# Patient Record
Sex: FEMALE | Race: BLACK OR AFRICAN AMERICAN | NOT HISPANIC OR LATINO
[De-identification: names, ages, dates, MRNs, and addresses within clinical notes are randomized per-mention and may not be internally consistent; named-entity substitution may affect disease eponyms.]

---

## 2024-01-25 PROBLEM — Z00.00 ENCOUNTER FOR PREVENTIVE HEALTH EXAMINATION: Status: ACTIVE | Noted: 2024-01-25

## 2024-02-12 ENCOUNTER — APPOINTMENT (OUTPATIENT)
Dept: OTOLARYNGOLOGY | Facility: CLINIC | Age: 89
End: 2024-02-12
Payer: MEDICARE

## 2024-02-12 VITALS — HEIGHT: 66 IN | WEIGHT: 139 LBS | BODY MASS INDEX: 22.34 KG/M2

## 2024-02-12 DIAGNOSIS — Z78.9 OTHER SPECIFIED HEALTH STATUS: ICD-10-CM

## 2024-02-12 DIAGNOSIS — Z85.9 PERSONAL HISTORY OF MALIGNANT NEOPLASM, UNSPECIFIED: ICD-10-CM

## 2024-02-12 DIAGNOSIS — H93.293 OTHER ABNORMAL AUDITORY PERCEPTIONS, BILATERAL: ICD-10-CM

## 2024-02-12 DIAGNOSIS — Z82.5 FAMILY HISTORY OF ASTHMA AND OTHER CHRONIC LOWER RESPIRATORY DISEASES: ICD-10-CM

## 2024-02-12 DIAGNOSIS — Z86.79 PERSONAL HISTORY OF OTHER DISEASES OF THE CIRCULATORY SYSTEM: ICD-10-CM

## 2024-02-12 DIAGNOSIS — Z87.39 PERSONAL HISTORY OF OTHER DISEASES OF THE MUSCULOSKELETAL SYSTEM AND CONNECTIVE TISSUE: ICD-10-CM

## 2024-02-12 DIAGNOSIS — Z86.39 PERSONAL HISTORY OF OTHER ENDOCRINE, NUTRITIONAL AND METABOLIC DISEASE: ICD-10-CM

## 2024-02-12 DIAGNOSIS — H61.23 IMPACTED CERUMEN, BILATERAL: ICD-10-CM

## 2024-02-12 PROCEDURE — 69210 REMOVE IMPACTED EAR WAX UNI: CPT

## 2024-02-12 PROCEDURE — 99204 OFFICE O/P NEW MOD 45 MIN: CPT | Mod: 25

## 2024-02-12 NOTE — PHYSICAL EXAM
[TextEntry] : PHYSICAL EXAM  General: The patient was alert, oriented and in no distress. Voice was clear. She uses a wheelchair   Face: The patient had no facial asymmetry or mass. The skin was unremarkable.  Ears: External ears were normal without deformity. Ear canals- see microscopy  Procedure: Microscopic Ear Exam The patient was positioned comfortably.  The microscope was used.  Left ear:  Ear canal-she had severe cerumen impaction which was partially removed.  She had the piece of it tip of a cotton swab which was removed.  She also had a canal cholesteatoma.  I was then able to remove the medial portion of the cholesteatoma or the cerumen due to discomfort.  There was mild inflammation Tympanic membrane-not visualized  Right ear: Ear canal-cerumen impaction was removed atraumatically using a suction, curette, and/or forceps.  No inflammation Tympanic membrane- intact with no inflammation, perforation or effusion   Nose:  The external nose had no significant deformity.     On anterior rhinoscopy, the nasal mucosa was clear.   The anterior septum was grossly midline. There were no visualized polyps, purulence  or masses.   Oral cavity: Oral mucosa- normal. Oral and base of tongue- clear and without mass. Gingival and buccal mucosa- moist and without lesions. Palate- the palate moved well. There was no cleft palate. There appeared to be good salivary flow.   Oral cavity/oropharynx- no pus, erythema or mass  Neck:  The neck was symmetrical. The parotid and submandibular glands were normal without masses. The trachea was midline and there was no unusual crepitus. Thyroid was smooth and nontender and no masses were palpated. No masses  Lymphatics: Cervical adenopathy- none.

## 2024-02-12 NOTE — ASSESSMENT
[FreeTextEntry1] : She had cerumen impaction on the right side which was removed.  She had cerumen along with the tip of a cotton swab and a canal cholesteatoma on the left side.  The tip of a cotton swab was removed as was a portion of the wax.  I was able to debride some of the canal cholesteatoma but was unable to remove the medial portion.  Plan -Findings and management options were discussed with the patient and her daughter - Dry ear precautions recommended. - Avoid using cotton swabs in the ears - I am placing her on Cortisporin eardrops for the left ear - I will see her back in 1 week to remove the remaining wax in canal cholesteatoma.  Then we will then perform an audiogram - They should call and return earlier if any problems

## 2024-02-12 NOTE — HISTORY OF PRESENT ILLNESS
[de-identified] : FRANSISCA MCELROY is a 89 year old patient Here for evaluation for hearing loss.  She was accompanied by her daughter.  She has no ear pain, otorrhea, tinnitus, or dizziness.  She has no history of recurrent ear infections or prior otologic surgery.  She has not had a recent audiogram

## 2024-02-29 ENCOUNTER — NON-APPOINTMENT (OUTPATIENT)
Age: 89
End: 2024-02-29

## 2024-02-29 RX ORDER — NEOMYCIN SULFATE, POLYMYXIN B SULFATE AND HYDROCORTISONE 3.5; 10000; 1 MG/ML; [IU]/ML; MG/ML
3.5-10000-1 SOLUTION AURICULAR (OTIC)
Qty: 1 | Refills: 1 | Status: DISCONTINUED | COMMUNITY
Start: 2024-02-12 | End: 2024-02-29

## 2024-03-01 ENCOUNTER — APPOINTMENT (OUTPATIENT)
Dept: HOME HEALTH SERVICES | Facility: HOME HEALTH | Age: 89
End: 2024-03-01
Payer: MEDICARE

## 2024-03-01 VITALS
SYSTOLIC BLOOD PRESSURE: 110 MMHG | OXYGEN SATURATION: 98 % | RESPIRATION RATE: 18 BRPM | TEMPERATURE: 97 F | DIASTOLIC BLOOD PRESSURE: 70 MMHG | HEART RATE: 74 BPM

## 2024-03-01 DIAGNOSIS — I10 ESSENTIAL (PRIMARY) HYPERTENSION: ICD-10-CM

## 2024-03-01 DIAGNOSIS — Z74.09 OTHER REDUCED MOBILITY: ICD-10-CM

## 2024-03-01 DIAGNOSIS — R06.2 WHEEZING: ICD-10-CM

## 2024-03-01 DIAGNOSIS — J30.2 OTHER SEASONAL ALLERGIC RHINITIS: ICD-10-CM

## 2024-03-01 DIAGNOSIS — M54.50 LOW BACK PAIN, UNSPECIFIED: ICD-10-CM

## 2024-03-01 DIAGNOSIS — Z99.3 DEPENDENCE ON WHEELCHAIR: ICD-10-CM

## 2024-03-01 DIAGNOSIS — E78.2 MIXED HYPERLIPIDEMIA: ICD-10-CM

## 2024-03-01 DIAGNOSIS — E87.6 HYPOKALEMIA: ICD-10-CM

## 2024-03-01 DIAGNOSIS — G89.29 LOW BACK PAIN, UNSPECIFIED: ICD-10-CM

## 2024-03-01 PROCEDURE — G0506: CPT

## 2024-03-01 PROCEDURE — 99344 HOME/RES VST NEW MOD MDM 60: CPT

## 2024-03-01 RX ORDER — FUROSEMIDE 80 MG/1
80 TABLET ORAL DAILY
Qty: 90 | Refills: 3 | Status: ACTIVE | COMMUNITY
Start: 2024-03-01

## 2024-03-01 RX ORDER — METOPROLOL SUCCINATE 50 MG/1
50 TABLET, EXTENDED RELEASE ORAL
Qty: 90 | Refills: 3 | Status: ACTIVE | COMMUNITY
Start: 2024-03-01

## 2024-03-01 RX ORDER — FLUTICASONE FUROATE 27.5 UG/1
27.5 SPRAY, METERED NASAL DAILY
Qty: 1 | Refills: 3 | Status: ACTIVE | COMMUNITY
Start: 2024-03-01

## 2024-03-01 RX ORDER — ALBUTEROL SULFATE 108 UG/1
108 (90 BASE) AEROSOL, METERED RESPIRATORY (INHALATION) EVERY 4 HOURS
Qty: 1 | Refills: 4 | Status: ACTIVE | COMMUNITY
Start: 2024-03-01

## 2024-03-01 RX ORDER — POTASSIUM CHLORIDE 750 MG/1
10 TABLET, EXTENDED RELEASE ORAL
Qty: 90 | Refills: 7 | Status: ACTIVE | COMMUNITY
Start: 2024-03-01

## 2024-03-01 RX ORDER — FOLIC ACID/VIT B COMPLEX AND C 0.8 MG
0.8 TABLET ORAL DAILY
Qty: 90 | Refills: 3 | Status: ACTIVE | COMMUNITY
Start: 2024-03-01 | End: 1900-01-01

## 2024-03-01 RX ORDER — ASPIRIN ENTERIC COATED TABLETS 81 MG 81 MG/1
81 TABLET, DELAYED RELEASE ORAL DAILY
Qty: 90 | Refills: 3 | Status: ACTIVE | COMMUNITY
Start: 2024-03-01

## 2024-03-01 NOTE — REASON FOR VISIT
[Intercurrent Specialty/Sub-specialty Visits] : the patient has no intercurrent specialty/sub-specialty visits [Pre-Visit Preparation] : pre-visit preparation was not done [FreeTextEntry1] : refractory back pain s/p a successful spinal cord stimulator

## 2024-03-01 NOTE — HEALTH RISK ASSESSMENT
[TimeGetUpGo] : 25 [de-identified] : Ambulates to restroom occasionally. spend most of the day in a chair.

## 2024-03-01 NOTE — HISTORY OF PRESENT ILLNESS
[House Calls Co-Management Patient] : [unfilled] is a House Calls co-management patient [Education provided] : education provided [LastPVisitDate] : 2/28/24 [FreeTextEntry1] : osteoarthritis, breast cancer (6/30/14), hyperlipidemia, hypertension, heart failure with reduced injection fraction, chronic kidney disease, lymphedema of arm, and peripheral neuropathy; [FreeTextEntry2] : 89-year-old female with osteoarthritis, breast cancer (6/30/14), hyperlipidemia, hypertension, heart failure with reduced injection fraction, atrial fibrillation, chronic kidney disease, lymphedema of arm, and peripheral neuropathy; cervical myelopathy, spinal cord compression in the C spine; chronic neck and back pain; post laminectomy syndrome; spinal stenosis of the cervical and lumbar spine.  Patient will ambulate with rollator to the restroom but spends most of her day in a recliner.  No pressure sores noted.  Refusing physical therapy at this time.   SURGICAL HISTORY: cervical disc surgery; back surgery (2005); breast biopsy (Left, 6/30/14); and breast lumpectomy (8/7/2014).  In 11/2018 underwent a percutaneous spinal cord stimulator trial using the [employer] system. One lead only was placed.  History of hospitalization  on 7/25/23 Was hospitalized with acute on chronic HFrEF exacerbation and new onset afib. - TTE  EF  15%, severe MR Found to have NSTEMIV/Q scan with low probability of PE. Noted TODD on CKD and was deemed likely prerenal 2/2 diuresis  Dc-d on PO lasix 80 mg daily  On Entresto - was recommended by Cardiologist to start  AC given new onset afib with reduced dosage sec to impaired kidney function Had GI bleed requiring hospitalization on Eliquis . Off Eliquis. On aspirin  Patient denies fever, cough, trouble breathing, rash, vomiting and diarrhea. Patient has not been in close contact with someone covid positive.  N95 mask, gloves, eye wear and gown used during visit: [Y/]. Total face to face time with patient is 60 min.  Patient/ patient's caregiver reports no weight loss >10lbs in the past 6 months. No changes in dentition or swallowing reported, No changes in hearing or vision reported. No changes in Cognition reported. Patient denies any symptoms of depression or anxiety. Patient is ADL and IADL dependent. No changes in gait or falls reported.  Patient's home environment is safe.

## 2024-03-01 NOTE — CHRONIC CARE ASSESSMENT
[FAST Score: ____] : Functional Assessment Scale (FAST) Score: [unfilled] [PPS Score: ____] : Palliative Performance Scale (PPS) Score: [unfilled] [de-identified] : low sodium [de-identified] : As tolerated

## 2024-03-01 NOTE — COUNSELING
[ - New patient with 2 or more chronic conditions; CCM discussed and patient-centered care plan established] : New patient with 2 or more chronic conditions; CCM discussed and patient-centered care plan established [FreeTextEntry4] : Educated patient/legal representative about CCM services and consent. Educated patient/legal representative that this service is available because they have 2 or more chronic conditions, that only one Provider can furnish CCM Services per calendar month and that CCM services are subject to the usual Medicare deductible and coinsurance.  Patient/legal representative understands the right to stop the CCM services at any time by notifying House Calls office. Patient/legal representative has verbally consented 2/2024

## 2024-03-01 NOTE — REVIEW OF SYSTEMS
[Joint Pain] : joint pain [Muscle Pain] : muscle pain [Back Pain] : back pain [FreeTextEntry8] : CKD [FreeTextEntry5] : CHF, hyperlipidemia and hypertension

## 2024-03-13 ENCOUNTER — APPOINTMENT (OUTPATIENT)
Dept: OTOLARYNGOLOGY | Facility: CLINIC | Age: 89
End: 2024-03-13
Payer: MEDICARE

## 2024-03-13 VITALS — BODY MASS INDEX: 22.34 KG/M2 | HEIGHT: 66 IN | WEIGHT: 139 LBS

## 2024-03-13 DIAGNOSIS — H90.3 SENSORINEURAL HEARING LOSS, BILATERAL: ICD-10-CM

## 2024-03-13 DIAGNOSIS — H60.42 CHOLESTEATOMA OF LEFT EXTERNAL EAR: ICD-10-CM

## 2024-03-13 DIAGNOSIS — H61.21 IMPACTED CERUMEN, RIGHT EAR: ICD-10-CM

## 2024-03-13 PROCEDURE — 92557 COMPREHENSIVE HEARING TEST: CPT

## 2024-03-13 PROCEDURE — 92567 TYMPANOMETRY: CPT

## 2024-03-13 PROCEDURE — G0268 REMOVAL OF IMPACTED WAX MD: CPT

## 2024-03-13 PROCEDURE — 99213 OFFICE O/P EST LOW 20 MIN: CPT | Mod: 25

## 2024-03-13 NOTE — CONSULT LETTER
[Dear  ___] : Dear  [unfilled], [Courtesy Letter:] : I had the pleasure of seeing your patient, [unfilled], in my office today. [Please see my note below.] : Please see my note below. [Consult Closing:] : Thank you very much for allowing me to participate in the care of this patient.  If you have any questions, please do not hesitate to contact me. [Sincerely,] : Sincerely, [FreeTextEntry3] : Ayah Chavez MD [facility] at [facility] Otolaryngology  Head & Neck Surgery [facility]   Department of Otolaryngology [facility] [employer] [facility] at [facility]  Office Tel: [phone]

## 2024-03-13 NOTE — PHYSICAL EXAM
[TextEntry] : PHYSICAL EXAM  General: The patient was alert, oriented and in no distress. Voice was clear. She uses a wheelchair  Face: The patient had no facial asymmetry or mass. The skin was unremarkable.  Ears: Hearing normal to conversational voice External ears were normal without deformity. External ear canals: AS- normal. no cerumen or inflammation. AD- cerumen impaction Tympanic membranes: AS- intact. no perforation or effusion  PROCEDURE- EAR MICROSCOPY AND CERUMEN REMOVAL from right ear  Indication: cerumen removal Under the microscope, obstructing cerumen and small canal cholesteatoma were removed atraumatically from the right ear with a suction, curette and/or forceps.  Canal: normal. no inflammation.  Tympanic membrane: Intact. no perforation or effusion.  Nose:  The external nose had no significant deformity.     Neck:  The neck was symmetrical.    AUDIOGRAM- test ordered and the results reviewed and discussed with the patient. [] Hearing-bilateral sensorineural hearing loss Word recognition-right ear-88%.  Left ear-84% Tympanograms- AD- type As, AS- type As

## 2024-03-13 NOTE — ASSESSMENT
[FreeTextEntry1] : Cerumen impaction and a small canal cholesteatoma were removed from the right ear.  The ear did not show significant inflammation or remodeling.  The tympanic membrane was normal.  Audiogram showed a bilateral sensorineural hearing loss.  Plan -Findings and management options were discussed with the patient and her  - Good ear hygiene reviewed.  She should avoid using cotton swabs in the ears - Wax removal drops as needed - Annual audiogram - Hearing aid evaluation recommended - Follow-up in 1 year if doing well. - Call and return earlier if any problems

## 2024-03-13 NOTE — HISTORY OF PRESENT ILLNESS
[de-identified] : FRANSISCA MCELROY is a 89 year old patient Here for follow-up for right cerumen impaction with canal cholesteatoma.  They had been using the eardrops.  She has no pain or drainage.   ENT history no history of recurrent ear infections or prior otologic surgery.

## 2024-03-14 PROBLEM — H90.3 SENSORINEURAL HEARING LOSS (SNHL) OF BOTH EARS: Status: ACTIVE | Noted: 2024-03-13

## 2024-04-17 ENCOUNTER — APPOINTMENT (OUTPATIENT)
Dept: HOME HEALTH SERVICES | Facility: HOME HEALTH | Age: 89
End: 2024-04-17

## 2024-04-23 ENCOUNTER — APPOINTMENT (OUTPATIENT)
Dept: HOME HEALTH SERVICES | Facility: HOME HEALTH | Age: 89
End: 2024-04-23

## 2024-04-29 ENCOUNTER — NON-APPOINTMENT (OUTPATIENT)
Age: 89
End: 2024-04-29

## 2024-05-10 ENCOUNTER — TRANSCRIPTION ENCOUNTER (OUTPATIENT)
Age: 89
End: 2024-05-10

## 2024-05-10 ENCOUNTER — NON-APPOINTMENT (OUTPATIENT)
Age: 89
End: 2024-05-10
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
## 2024-05-14 ENCOUNTER — NON-APPOINTMENT (OUTPATIENT)
Age: 89
End: 2024-05-14

## 2024-05-14 LAB
ANION GAP SERPL CALC-SCNC: 14 MMOL/L
BASOPHILS # BLD AUTO: 0.04 K/UL
BASOPHILS NFR BLD AUTO: 0.5 %
BUN SERPL-MCNC: 109 MG/DL
CALCIUM SERPL-MCNC: 9.1 MG/DL
CHLORIDE SERPL-SCNC: 86 MMOL/L
CO2 SERPL-SCNC: 27 MMOL/L
CREAT SERPL-MCNC: 2.56 MG/DL
EGFR: 17 ML/MIN/1.73M2
EOSINOPHIL # BLD AUTO: 0.16 K/UL
EOSINOPHIL NFR BLD AUTO: 2.2 %
GLUCOSE SERPL-MCNC: 105 MG/DL
HCT VFR BLD CALC: 27.8 %
HGB BLD-MCNC: 9.3 G/DL
IMM GRANULOCYTES NFR BLD AUTO: 0.4 %
LYMPHOCYTES # BLD AUTO: 1 K/UL
LYMPHOCYTES NFR BLD AUTO: 13.5 %
MAN DIFF?: NORMAL
MCHC RBC-ENTMCNC: 29.3 PG
MCHC RBC-ENTMCNC: 33.5 GM/DL
MCV RBC AUTO: 87.7 FL
MONOCYTES # BLD AUTO: 0.51 K/UL
MONOCYTES NFR BLD AUTO: 6.9 %
NEUTROPHILS # BLD AUTO: 5.68 K/UL
NEUTROPHILS NFR BLD AUTO: 76.5 %
PLATELET # BLD AUTO: 286 K/UL
POTASSIUM SERPL-SCNC: 4.7 MMOL/L
RBC # BLD: 3.17 M/UL
RBC # FLD: 13.3 %
SODIUM SERPL-SCNC: 127 MMOL/L
WBC # FLD AUTO: 7.42 K/UL

## 2024-05-16 ENCOUNTER — NON-APPOINTMENT (OUTPATIENT)
Age: 89
End: 2024-05-16

## 2024-05-17 ENCOUNTER — NON-APPOINTMENT (OUTPATIENT)
Age: 89
End: 2024-05-17

## 2024-05-31 ENCOUNTER — APPOINTMENT (OUTPATIENT)
Dept: HOME HEALTH SERVICES | Facility: HOME HEALTH | Age: 89
End: 2024-05-31
Payer: MEDICARE

## 2024-05-31 DIAGNOSIS — M21.371 FOOT DROP, RIGHT FOOT: ICD-10-CM

## 2024-05-31 DIAGNOSIS — N18.4 CHRONIC KIDNEY DISEASE, STAGE 4 (SEVERE): ICD-10-CM

## 2024-05-31 DIAGNOSIS — I50.20 UNSPECIFIED SYSTOLIC (CONGESTIVE) HEART FAILURE: ICD-10-CM

## 2024-05-31 DIAGNOSIS — I89.0 LYMPHEDEMA, NOT ELSEWHERE CLASSIFIED: ICD-10-CM

## 2024-05-31 DIAGNOSIS — I48.91 UNSPECIFIED ATRIAL FIBRILLATION: ICD-10-CM

## 2024-05-31 DIAGNOSIS — E87.1 HYPO-OSMOLALITY AND HYPONATREMIA: ICD-10-CM

## 2024-05-31 DIAGNOSIS — I34.0 NONRHEUMATIC MITRAL (VALVE) INSUFFICIENCY: ICD-10-CM

## 2024-05-31 DIAGNOSIS — N17.9 ACUTE KIDNEY FAILURE, UNSPECIFIED: ICD-10-CM

## 2024-05-31 DIAGNOSIS — Z71.89 OTHER SPECIFIED COUNSELING: ICD-10-CM

## 2024-05-31 DIAGNOSIS — I25.10 ATHEROSCLEROTIC HEART DISEASE OF NATIVE CORONARY ARTERY W/OUT ANGINA PECTORIS: ICD-10-CM

## 2024-05-31 DIAGNOSIS — D64.9 ANEMIA, UNSPECIFIED: ICD-10-CM

## 2024-05-31 DIAGNOSIS — L89.92 PRESSURE ULCER OF UNSPECIFIED SITE, STAGE 2: ICD-10-CM

## 2024-05-31 DIAGNOSIS — K92.1 MELENA: ICD-10-CM

## 2024-05-31 DIAGNOSIS — G95.20 UNSPECIFIED CORD COMPRESSION: ICD-10-CM

## 2024-05-31 DIAGNOSIS — I25.5 ISCHEMIC CARDIOMYOPATHY: ICD-10-CM

## 2024-05-31 PROCEDURE — 99495 TRANSJ CARE MGMT MOD F2F 14D: CPT

## 2024-06-01 ENCOUNTER — NON-APPOINTMENT (OUTPATIENT)
Age: 89
End: 2024-06-01

## 2024-06-01 ENCOUNTER — TRANSCRIPTION ENCOUNTER (OUTPATIENT)
Age: 89
End: 2024-06-01

## 2024-06-03 VITALS
TEMPERATURE: 98.9 F | DIASTOLIC BLOOD PRESSURE: 70 MMHG | RESPIRATION RATE: 17 BRPM | OXYGEN SATURATION: 94 % | SYSTOLIC BLOOD PRESSURE: 120 MMHG | HEART RATE: 71 BPM

## 2024-06-03 PROBLEM — L89.92 PRESSURE ULCER, STAGE 2: Status: ACTIVE | Noted: 2024-06-03

## 2024-06-03 RX ORDER — ROSUVASTATIN CALCIUM 10 MG/1
10 TABLET, FILM COATED ORAL
Qty: 90 | Refills: 0 | Status: ACTIVE | COMMUNITY
Start: 2024-05-21

## 2024-06-03 RX ORDER — ROSUVASTATIN CALCIUM 20 MG/1
20 TABLET, FILM COATED ORAL
Qty: 90 | Refills: 3 | Status: DISCONTINUED | COMMUNITY
Start: 2024-03-01 | End: 2024-06-03

## 2024-06-03 RX ORDER — PANTOPRAZOLE 40 MG/1
40 TABLET, DELAYED RELEASE ORAL
Qty: 60 | Refills: 0 | Status: ACTIVE | COMMUNITY
Start: 2024-05-28

## 2024-06-03 NOTE — HISTORY OF PRESENT ILLNESS
[Family Member] : family member [Formal Caregiver] : formal caregiver [House Calls Co-Management Patient] : [unfilled] is a House Calls co-management patient [Education provided] : education provided [FreeTextEntry1] : osteoarthritis, breast cancer (6/30/14), hyperlipidemia, hypertension, heart failure with reduced injection fraction, chronic kidney disease, lymphedema of arm, and peripheral neuropathy; [FreeTextEntry2] : Rajiv Mack [LastPVisitDate] : 2/28/24

## 2024-06-03 NOTE — HEALTH RISK ASSESSMENT
[HRA Reviewed] : Health risk assessment reviewed [Some assistance needed] : feeding [Full assistance needed] : managing finances [Patient not ambulatory (Wheelchair)] : Patient is not ambulatory (Wheelchair) [Yes] : The patient does have visual impairment [TimeGetUpGo] : 0

## 2024-06-03 NOTE — REVIEW OF SYSTEMS
[Joint Pain] : joint pain [Muscle Pain] : muscle pain [Back Pain] : back pain [Negative] : Heme/Lymph [FreeTextEntry5] : CHF, hyperlipidemia and hypertension [FreeTextEntry8] : CKD [Fatigue] : fatigue [Vision Problems] : vision problems [Hearing Loss] : hearing loss [Incontinence] : incontinence [Joint Stiffness] : joint stiffness [Muscle Weakness] : muscle weakness [Unsteady Walking] : ataxia [FreeTextEntry7] : poor appetite

## 2024-06-03 NOTE — COUNSELING
[Normal Weight - ( BMI  <25 )] : normal weight - ( BMI  <25 ) [Hypertension self management education material provided] : hypertension self management education material provided [Non - Smoker] : non-smoker [Use assistive device to avoid falls] : use assistive device to avoid falls [___] : [unfilled] [Patient not on disease-modifying anti-rheumatic drug due to overall prognosis] : Patient not on disease-modifying anti-rheumatic drug due to overall prognosis [Not Recommended] : Aspirin use not recommended due to overall prognosis [Decrease hospital use] : decrease hospital use [Discussed disease trajectory with patient/caregiver] : discussed disease trajectory with patient/caregiver [] : colonoscopy [Completed] : Aspirin use discussion completed [FreeTextEntry4] : Educated patient/legal representative about CCM services and consent. Educated patient/legal representative that this service is available because they have 2 or more chronic conditions, that only one Provider can furnish CCM Services per calendar month and that CCM services are subject to the usual Medicare deductible and coinsurance.  Patient/legal representative understands the right to stop the CCM services at any time by notifying House Calls office. Patient/legal representative has verbally consented 2/2024

## 2024-06-03 NOTE — PHYSICAL EXAM
[No Acute Distress] : no acute distress [Normal Sclera/Conjunctiva] : normal sclera/conjunctiva [EOMI] : extra ocular movement intact [Normal Outer Ear/Nose] : the ears and nose were normal in appearance [Normal Oropharynx] : the oropharynx was normal [No Respiratory Distress] : no respiratory distress [Clear to Auscultation] : lungs were clear to auscultation bilaterally [No Accessory Muscle Use] : no accessory muscle use [Normal Rate] : heart rate was normal  [Regular Rhythm] : with a regular rhythm [Normal S1, S2] : normal S1 and S2 [No Murmurs] : no murmurs heard [No Edema] : there was no peripheral edema [Normal Bowel Sounds] : normal bowel sounds [Non Tender] : non-tender [Soft] : abdomen soft [Not Distended] : not distended [Normal Post Cervical Nodes] : no posterior cervical lymphadenopathy [Normal Anterior Cervical Nodes] : no anterior cervical lymphadenopathy [No CVA Tenderness] : no ~M costovertebral angle tenderness [No Spinal Tenderness] : no spinal tenderness [Normal Gait] : normal gait [Normal Strength/Tone] : muscle strength and tone were normal [No Rash] : no rash [Normal Affect] : the affect was normal [No JVD] : no jugular venous distention [Breast Exam Declined] : patient declined to have breast exam done [Oriented x3] : oriented to person, place, and time [de-identified] : mackenzie present draining c/y/u [de-identified] : +murmur [de-identified] : generalized weakness throughout, requires assistance to turn in bed, strength 3/5 to all extremities  [de-identified] : stage 2 pressure ulcer left buttock approximately 1x1cm [de-identified] : +R foot drop, daughter states is chronic [de-identified] : flat affect

## 2024-06-03 NOTE — CHRONIC CARE ASSESSMENT
[Limited decision making ability] : limited decision making ability [FAST Score: ____] : Functional Assessment Scale (FAST) Score: [unfilled] [PPS Score: ____] : Palliative Performance Scale (PPS) Score: [unfilled] [de-identified] : has PT [de-identified] : low sodium

## 2024-06-03 NOTE — REASON FOR VISIT
[Post-hospitalization from ___ Hospital] : Post-hospitalization from [unfilled] Hospital [Admitted on: ___] : The patient was admitted on [unfilled] [Discharged on ___] : discharged on [unfilled] [Initial Evaluation] : an initial evaluation [Family Member] : family member [Formal Caregiver] : formal caregiver [Post Hospitalization] : a post hospitalization visit [Pre-Visit Preparation] : pre-visit preparation was done [Intercurrent Specialty/Sub-specialty Visits] : the patient has intercurrent specialty/sub-specialty visits [FreeTextEntry1] :  successful spinal cord stimulator  [FreeTextEntry2] : chart, HIE review [FreeTextEntry3] : cardiology

## 2024-06-04 VITALS
SYSTOLIC BLOOD PRESSURE: 106 MMHG | RESPIRATION RATE: 16 BRPM | OXYGEN SATURATION: 97 % | WEIGHT: 158.73 LBS | TEMPERATURE: 99 F | DIASTOLIC BLOOD PRESSURE: 74 MMHG | HEART RATE: 84 BPM

## 2024-06-04 LAB
BASE EXCESS BLDV CALC-SCNC: -4.8 MMOL/L — LOW (ref -2–3)
BASOPHILS # BLD AUTO: 0.03 K/UL — SIGNIFICANT CHANGE UP (ref 0–0.2)
BASOPHILS NFR BLD AUTO: 0.3 % — SIGNIFICANT CHANGE UP (ref 0–2)
CO2 BLDV-SCNC: 22.4 MMOL/L — SIGNIFICANT CHANGE UP (ref 22–26)
EOSINOPHIL # BLD AUTO: 0 K/UL — SIGNIFICANT CHANGE UP (ref 0–0.5)
EOSINOPHIL NFR BLD AUTO: 0 % — SIGNIFICANT CHANGE UP (ref 0–6)
GAS PNL BLDV: SIGNIFICANT CHANGE UP
HCO3 BLDV-SCNC: 21 MMOL/L — LOW (ref 22–29)
HCT VFR BLD CALC: 25.8 % — LOW (ref 34.5–45)
HGB BLD-MCNC: 8.3 G/DL — LOW (ref 11.5–15.5)
IMM GRANULOCYTES NFR BLD AUTO: 0.5 % — SIGNIFICANT CHANGE UP (ref 0–0.9)
LACTATE SERPL-SCNC: 1.8 MMOL/L — SIGNIFICANT CHANGE UP (ref 0.5–2)
LYMPHOCYTES # BLD AUTO: 0.78 K/UL — LOW (ref 1–3.3)
LYMPHOCYTES # BLD AUTO: 8.5 % — LOW (ref 13–44)
MCHC RBC-ENTMCNC: 27.9 PG — SIGNIFICANT CHANGE UP (ref 27–34)
MCHC RBC-ENTMCNC: 32.2 GM/DL — SIGNIFICANT CHANGE UP (ref 32–36)
MCV RBC AUTO: 86.6 FL — SIGNIFICANT CHANGE UP (ref 80–100)
MONOCYTES # BLD AUTO: 1.14 K/UL — HIGH (ref 0–0.9)
MONOCYTES NFR BLD AUTO: 12.4 % — SIGNIFICANT CHANGE UP (ref 2–14)
NEUTROPHILS # BLD AUTO: 7.23 K/UL — SIGNIFICANT CHANGE UP (ref 1.8–7.4)
NEUTROPHILS NFR BLD AUTO: 78.3 % — HIGH (ref 43–77)
NRBC # BLD: 0 /100 WBCS — SIGNIFICANT CHANGE UP (ref 0–0)
PCO2 BLDV: 41 MMHG — SIGNIFICANT CHANGE UP (ref 39–42)
PH BLDV: 7.32 — SIGNIFICANT CHANGE UP (ref 7.32–7.43)
PLATELET # BLD AUTO: 281 K/UL — SIGNIFICANT CHANGE UP (ref 150–400)
PO2 BLDV: 34 MMHG — SIGNIFICANT CHANGE UP (ref 25–45)
RBC # BLD: 2.98 M/UL — LOW (ref 3.8–5.2)
RBC # FLD: 14.6 % — HIGH (ref 10.3–14.5)
SAO2 % BLDV: 53 % — LOW (ref 67–88)
WBC # BLD: 9.23 K/UL — SIGNIFICANT CHANGE UP (ref 3.8–10.5)
WBC # FLD AUTO: 9.23 K/UL — SIGNIFICANT CHANGE UP (ref 3.8–10.5)

## 2024-06-04 PROCEDURE — 99291 CRITICAL CARE FIRST HOUR: CPT

## 2024-06-04 RX ORDER — LIDOCAINE 4 G/100G
1 CREAM TOPICAL ONCE
Refills: 0 | Status: COMPLETED | OUTPATIENT
Start: 2024-06-04 | End: 2024-06-04

## 2024-06-04 RX ORDER — ONDANSETRON 8 MG/1
4 TABLET, FILM COATED ORAL ONCE
Refills: 0 | Status: COMPLETED | OUTPATIENT
Start: 2024-06-04 | End: 2024-06-04

## 2024-06-04 RX ORDER — KETOROLAC TROMETHAMINE 30 MG/ML
15 SYRINGE (ML) INJECTION ONCE
Refills: 0 | Status: DISCONTINUED | OUTPATIENT
Start: 2024-06-04 | End: 2024-06-04

## 2024-06-04 RX ORDER — CEFEPIME 1 G/1
2000 INJECTION, POWDER, FOR SOLUTION INTRAMUSCULAR; INTRAVENOUS ONCE
Refills: 0 | Status: COMPLETED | OUTPATIENT
Start: 2024-06-04 | End: 2024-06-04

## 2024-06-04 RX ADMIN — LIDOCAINE 1 PATCH: 4 CREAM TOPICAL at 23:45

## 2024-06-04 RX ADMIN — CEFEPIME 100 MILLIGRAM(S): 1 INJECTION, POWDER, FOR SOLUTION INTRAMUSCULAR; INTRAVENOUS at 23:45

## 2024-06-04 RX ADMIN — ONDANSETRON 4 MILLIGRAM(S): 8 TABLET, FILM COATED ORAL at 23:45

## 2024-06-04 RX ADMIN — Medication 15 MILLIGRAM(S): at 23:45

## 2024-06-04 NOTE — ED ADULT TRIAGE NOTE - WEIGHT IN KG
Onset: last night       Location / description: Pt reports a cough- yellow productive since last night, has SOB which she always has she states, is SOB at rest, advised ED eval, states she has noway to get to ED will go to  and disconnected call.    Precipitating Factors: as above    Pain Scale (1-10), 10 highest: 0/10    What improves/worsens symptoms: Nothing    Symptom specific medications: None  LMP : Patient's last menstrual period was 01/26/2016.   Are you pregnant or breast feeding: NA    Recent visits (last 3-4 weeks) for same reason or recent surgery:  None    PLAN:    Go to the Emergency Department    Patient/Caller does not plan to follow recommendations.      Reason for Disposition  • [1] MODERATE difficulty breathing (e.g., speaks in phrases, SOB even at rest, pulse 100-120) AND [2] still present when not coughing    Protocols used: COUGH - ACUTE [id number]      
Regarding: FEMALE 60 coughing, phlegm  and mucus breathing concerns  ----- Message from Susu Schmitz sent at 11/24/2023  5:32 PM CST -----  Patient Name: Mansi Cole    Specialist or PCP Name: Dr Papito Cerrato    Symptoms: FEMALE 60 coughing, phlegm  and mucus breathing concerns     Pregnant (females aged 13-60. If Yes, how long?) : no    Call Back # : [phone]    Which State are you currently located in?: [place]     Name of Clinic Site / Acct# : [facility]     Use following scripting for patients waiting for a callback:   \"Nurse callback times vary based on call volumes; please be aware the return phone call may come from an unidentified or out of state phone number. If your symptoms worsen or become life threatening while waiting, you should seek immediate assistance by calling 911 or going to the ER for evaluation.\"    
72

## 2024-06-04 NOTE — ED ADULT TRIAGE NOTE - CHIEF COMPLAINT QUOTE
Pt, with hx of HTN, CKD, CHF and chronic back pain (stimulator in place), presents to ER c/o lower back pain with decreased ability to ambulate (uses walker). While enroute, EMS reports pt had 2 episodes of vomiting and family at bedside reports pt had 2 episodes of diarrhea today.

## 2024-06-05 ENCOUNTER — NON-APPOINTMENT (OUTPATIENT)
Age: 89
End: 2024-06-05

## 2024-06-05 ENCOUNTER — INPATIENT (INPATIENT)
Facility: HOSPITAL | Age: 89
LOS: 2 days | Discharge: HOSPICE | DRG: 280 | End: 2024-06-08
Attending: STUDENT IN AN ORGANIZED HEALTH CARE EDUCATION/TRAINING PROGRAM | Admitting: INTERNAL MEDICINE
Payer: MEDICARE

## 2024-06-05 ENCOUNTER — RESULT REVIEW (OUTPATIENT)
Age: 89
End: 2024-06-05

## 2024-06-05 DIAGNOSIS — R50.9 FEVER, UNSPECIFIED: ICD-10-CM

## 2024-06-05 DIAGNOSIS — M54.50 LOW BACK PAIN, UNSPECIFIED: ICD-10-CM

## 2024-06-05 DIAGNOSIS — I48.20 CHRONIC ATRIAL FIBRILLATION, UNSPECIFIED: ICD-10-CM

## 2024-06-05 DIAGNOSIS — D64.9 ANEMIA, UNSPECIFIED: ICD-10-CM

## 2024-06-05 DIAGNOSIS — Z51.5 ENCOUNTER FOR PALLIATIVE CARE: ICD-10-CM

## 2024-06-05 DIAGNOSIS — R19.7 DIARRHEA, UNSPECIFIED: ICD-10-CM

## 2024-06-05 DIAGNOSIS — E87.5 HYPERKALEMIA: ICD-10-CM

## 2024-06-05 DIAGNOSIS — N39.0 URINARY TRACT INFECTION, SITE NOT SPECIFIED: ICD-10-CM

## 2024-06-05 DIAGNOSIS — Z29.9 ENCOUNTER FOR PROPHYLACTIC MEASURES, UNSPECIFIED: ICD-10-CM

## 2024-06-05 DIAGNOSIS — E87.1 HYPO-OSMOLALITY AND HYPONATREMIA: ICD-10-CM

## 2024-06-05 DIAGNOSIS — I50.23 ACUTE ON CHRONIC SYSTOLIC (CONGESTIVE) HEART FAILURE: ICD-10-CM

## 2024-06-05 DIAGNOSIS — G93.41 METABOLIC ENCEPHALOPATHY: ICD-10-CM

## 2024-06-05 DIAGNOSIS — N17.9 ACUTE KIDNEY FAILURE, UNSPECIFIED: ICD-10-CM

## 2024-06-05 LAB
ADD ON TEST-SPECIMEN IN LAB: SIGNIFICANT CHANGE UP
ALBUMIN SERPL ELPH-MCNC: 3.5 G/DL — SIGNIFICANT CHANGE UP (ref 3.3–5)
ALBUMIN SERPL ELPH-MCNC: 3.6 G/DL — SIGNIFICANT CHANGE UP (ref 3.3–5)
ALBUMIN SERPL ELPH-MCNC: 3.9 G/DL — SIGNIFICANT CHANGE UP (ref 3.3–5)
ALP SERPL-CCNC: 103 U/L — SIGNIFICANT CHANGE UP (ref 40–120)
ALP SERPL-CCNC: 88 U/L — SIGNIFICANT CHANGE UP (ref 40–120)
ALP SERPL-CCNC: 90 U/L — SIGNIFICANT CHANGE UP (ref 40–120)
ALT FLD-CCNC: 27 U/L — SIGNIFICANT CHANGE UP (ref 10–45)
ALT FLD-CCNC: 29 U/L — SIGNIFICANT CHANGE UP (ref 10–45)
ALT FLD-CCNC: 32 U/L — SIGNIFICANT CHANGE UP (ref 10–45)
ANION GAP SERPL CALC-SCNC: 11 MMOL/L — SIGNIFICANT CHANGE UP (ref 5–17)
ANION GAP SERPL CALC-SCNC: 13 MMOL/L — SIGNIFICANT CHANGE UP (ref 5–17)
ANION GAP SERPL CALC-SCNC: 13 MMOL/L — SIGNIFICANT CHANGE UP (ref 5–17)
ANION GAP SERPL CALC-SCNC: 8 MMOL/L — SIGNIFICANT CHANGE UP (ref 5–17)
ANION GAP SERPL CALC-SCNC: 8 MMOL/L — SIGNIFICANT CHANGE UP (ref 5–17)
ANION GAP SERPL CALC-SCNC: 9 MMOL/L — SIGNIFICANT CHANGE UP (ref 5–17)
APPEARANCE UR: ABNORMAL
APPEARANCE UR: ABNORMAL
APTT BLD: 33.6 SEC — SIGNIFICANT CHANGE UP (ref 24.5–35.6)
AST SERPL-CCNC: 27 U/L — SIGNIFICANT CHANGE UP (ref 10–40)
AST SERPL-CCNC: 28 U/L — SIGNIFICANT CHANGE UP (ref 10–40)
AST SERPL-CCNC: 35 U/L — SIGNIFICANT CHANGE UP (ref 10–40)
BACTERIA # UR AUTO: ABNORMAL /HPF
BASE EXCESS BLDCOV CALC-SCNC: -4.8 MMOL/L — SIGNIFICANT CHANGE UP (ref -9.3–0.3)
BASE EXCESS BLDV CALC-SCNC: -2.3 MMOL/L — LOW (ref -2–3)
BASOPHILS # BLD AUTO: 0.01 K/UL — SIGNIFICANT CHANGE UP (ref 0–0.2)
BASOPHILS NFR BLD AUTO: 0.1 % — SIGNIFICANT CHANGE UP (ref 0–2)
BILIRUB SERPL-MCNC: 0.4 MG/DL — SIGNIFICANT CHANGE UP (ref 0.2–1.2)
BILIRUB SERPL-MCNC: 0.4 MG/DL — SIGNIFICANT CHANGE UP (ref 0.2–1.2)
BILIRUB SERPL-MCNC: 0.5 MG/DL — SIGNIFICANT CHANGE UP (ref 0.2–1.2)
BILIRUB UR-MCNC: NEGATIVE — SIGNIFICANT CHANGE UP
BILIRUB UR-MCNC: NEGATIVE — SIGNIFICANT CHANGE UP
BLD GP AB SCN SERPL QL: NEGATIVE — SIGNIFICANT CHANGE UP
BLD GP AB SCN SERPL QL: NEGATIVE — SIGNIFICANT CHANGE UP
BUN SERPL-MCNC: 79 MG/DL — HIGH (ref 7–23)
BUN SERPL-MCNC: 81 MG/DL — HIGH (ref 7–23)
BUN SERPL-MCNC: 82 MG/DL — HIGH (ref 7–23)
BUN SERPL-MCNC: 82 MG/DL — HIGH (ref 7–23)
BUN SERPL-MCNC: 84 MG/DL — HIGH (ref 7–23)
BUN SERPL-MCNC: 84 MG/DL — HIGH (ref 7–23)
CALCIUM SERPL-MCNC: 8.9 MG/DL — SIGNIFICANT CHANGE UP (ref 8.4–10.5)
CALCIUM SERPL-MCNC: 8.9 MG/DL — SIGNIFICANT CHANGE UP (ref 8.4–10.5)
CALCIUM SERPL-MCNC: 9 MG/DL — SIGNIFICANT CHANGE UP (ref 8.4–10.5)
CALCIUM SERPL-MCNC: 9 MG/DL — SIGNIFICANT CHANGE UP (ref 8.4–10.5)
CALCIUM SERPL-MCNC: 9.3 MG/DL — SIGNIFICANT CHANGE UP (ref 8.4–10.5)
CALCIUM SERPL-MCNC: 9.5 MG/DL — SIGNIFICANT CHANGE UP (ref 8.4–10.5)
CAST: 7 /LPF — HIGH (ref 0–4)
CHLORIDE SERPL-SCNC: 91 MMOL/L — LOW (ref 96–108)
CHLORIDE SERPL-SCNC: 92 MMOL/L — LOW (ref 96–108)
CHLORIDE SERPL-SCNC: 93 MMOL/L — LOW (ref 96–108)
CHLORIDE SERPL-SCNC: 94 MMOL/L — LOW (ref 96–108)
CHLORIDE SERPL-SCNC: 94 MMOL/L — LOW (ref 96–108)
CHLORIDE SERPL-SCNC: 95 MMOL/L — LOW (ref 96–108)
CK MB CFR SERPL CALC: 1.8 NG/ML — SIGNIFICANT CHANGE UP (ref 0–6.7)
CK MB CFR SERPL CALC: 1.9 NG/ML — SIGNIFICANT CHANGE UP (ref 0–6.7)
CK SERPL-CCNC: 78 U/L — SIGNIFICANT CHANGE UP (ref 25–170)
CK SERPL-CCNC: 90 U/L — SIGNIFICANT CHANGE UP (ref 25–170)
CO2 BLDCOV-SCNC: 23 MMOL/L — SIGNIFICANT CHANGE UP
CO2 BLDV-SCNC: 23.7 MMOL/L — SIGNIFICANT CHANGE UP (ref 22–26)
CO2 SERPL-SCNC: 17 MMOL/L — LOW (ref 22–31)
CO2 SERPL-SCNC: 19 MMOL/L — LOW (ref 22–31)
CO2 SERPL-SCNC: 20 MMOL/L — LOW (ref 22–31)
CO2 SERPL-SCNC: 20 MMOL/L — LOW (ref 22–31)
CO2 SERPL-SCNC: 22 MMOL/L — SIGNIFICANT CHANGE UP (ref 22–31)
CO2 SERPL-SCNC: 22 MMOL/L — SIGNIFICANT CHANGE UP (ref 22–31)
COLOR SPEC: SIGNIFICANT CHANGE UP
COLOR SPEC: YELLOW — SIGNIFICANT CHANGE UP
CREAT ?TM UR-MCNC: 62 MG/DL — SIGNIFICANT CHANGE UP
CREAT SERPL-MCNC: 2.56 MG/DL — HIGH (ref 0.5–1.3)
CREAT SERPL-MCNC: 2.63 MG/DL — HIGH (ref 0.5–1.3)
CREAT SERPL-MCNC: 2.68 MG/DL — HIGH (ref 0.5–1.3)
CREAT SERPL-MCNC: 2.79 MG/DL — HIGH (ref 0.5–1.3)
CREAT SERPL-MCNC: 2.93 MG/DL — HIGH (ref 0.5–1.3)
CREAT SERPL-MCNC: 2.99 MG/DL — HIGH (ref 0.5–1.3)
DIFF PNL FLD: ABNORMAL
DIFF PNL FLD: ABNORMAL
EGFR: 14 ML/MIN/1.73M2 — LOW
EGFR: 15 ML/MIN/1.73M2 — LOW
EGFR: 16 ML/MIN/1.73M2 — LOW
EGFR: 16 ML/MIN/1.73M2 — LOW
EGFR: 17 ML/MIN/1.73M2 — LOW
EGFR: 17 ML/MIN/1.73M2 — LOW
EOSINOPHIL # BLD AUTO: 0 K/UL — SIGNIFICANT CHANGE UP (ref 0–0.5)
EOSINOPHIL NFR BLD AUTO: 0 % — SIGNIFICANT CHANGE UP (ref 0–6)
FERRITIN SERPL-MCNC: 40 NG/ML — SIGNIFICANT CHANGE UP (ref 13–330)
GAS PNL BLDCOV: 7.3 — SIGNIFICANT CHANGE UP (ref 7.25–7.45)
GAS PNL BLDV: SIGNIFICANT CHANGE UP
GLUCOSE BLDC GLUCOMTR-MCNC: 134 MG/DL — HIGH (ref 70–99)
GLUCOSE BLDC GLUCOMTR-MCNC: 97 MG/DL — SIGNIFICANT CHANGE UP (ref 70–99)
GLUCOSE SERPL-MCNC: 103 MG/DL — HIGH (ref 70–99)
GLUCOSE SERPL-MCNC: 124 MG/DL — HIGH (ref 70–99)
GLUCOSE SERPL-MCNC: 135 MG/DL — HIGH (ref 70–99)
GLUCOSE SERPL-MCNC: 141 MG/DL — HIGH (ref 70–99)
GLUCOSE SERPL-MCNC: 91 MG/DL — SIGNIFICANT CHANGE UP (ref 70–99)
GLUCOSE SERPL-MCNC: 98 MG/DL — SIGNIFICANT CHANGE UP (ref 70–99)
GLUCOSE UR QL: NEGATIVE MG/DL — SIGNIFICANT CHANGE UP
GLUCOSE UR QL: NEGATIVE MG/DL — SIGNIFICANT CHANGE UP
HAPTOGLOB SERPL-MCNC: 184 MG/DL — SIGNIFICANT CHANGE UP (ref 34–200)
HCO3 BLDCOV-SCNC: 22 MMOL/L — SIGNIFICANT CHANGE UP
HCO3 BLDV-SCNC: 22 MMOL/L — SIGNIFICANT CHANGE UP (ref 22–29)
HCT VFR BLD CALC: 22.9 % — LOW (ref 34.5–45)
HGB BLD-MCNC: 7.5 G/DL — LOW (ref 11.5–15.5)
HIV 1+2 AB+HIV1 P24 AG SERPL QL IA: SIGNIFICANT CHANGE UP
IMM GRANULOCYTES NFR BLD AUTO: 0.7 % — SIGNIFICANT CHANGE UP (ref 0–0.9)
INR BLD: 1.17 — SIGNIFICANT CHANGE UP (ref 0.85–1.18)
IRON SATN MFR SERPL: 15 UG/DL — LOW (ref 30–160)
IRON SATN MFR SERPL: SIGNIFICANT CHANGE UP % (ref 14–50)
KETONES UR-MCNC: ABNORMAL MG/DL
KETONES UR-MCNC: NEGATIVE MG/DL — SIGNIFICANT CHANGE UP
LDH SERPL L TO P-CCNC: 266 U/L — HIGH (ref 50–242)
LEUKOCYTE ESTERASE UR-ACNC: ABNORMAL
LEUKOCYTE ESTERASE UR-ACNC: ABNORMAL
LIDOCAIN IGE QN: 43 U/L — SIGNIFICANT CHANGE UP (ref 7–60)
LYMPHOCYTES # BLD AUTO: 0.5 K/UL — LOW (ref 1–3.3)
LYMPHOCYTES # BLD AUTO: 6.8 % — LOW (ref 13–44)
MAGNESIUM SERPL-MCNC: 2.1 MG/DL — SIGNIFICANT CHANGE UP (ref 1.6–2.6)
MAGNESIUM SERPL-MCNC: 2.2 MG/DL — SIGNIFICANT CHANGE UP (ref 1.6–2.6)
MCHC RBC-ENTMCNC: 28.4 PG — SIGNIFICANT CHANGE UP (ref 27–34)
MCHC RBC-ENTMCNC: 32.8 GM/DL — SIGNIFICANT CHANGE UP (ref 32–36)
MCV RBC AUTO: 86.7 FL — SIGNIFICANT CHANGE UP (ref 80–100)
MONOCYTES # BLD AUTO: 0.97 K/UL — HIGH (ref 0–0.9)
MONOCYTES NFR BLD AUTO: 13.3 % — SIGNIFICANT CHANGE UP (ref 2–14)
NEUTROPHILS # BLD AUTO: 5.78 K/UL — SIGNIFICANT CHANGE UP (ref 1.8–7.4)
NEUTROPHILS NFR BLD AUTO: 79.1 % — HIGH (ref 43–77)
NITRITE UR-MCNC: NEGATIVE — SIGNIFICANT CHANGE UP
NITRITE UR-MCNC: NEGATIVE — SIGNIFICANT CHANGE UP
NRBC # BLD: 0 /100 WBCS — SIGNIFICANT CHANGE UP (ref 0–0)
OB PNL STL: NEGATIVE — SIGNIFICANT CHANGE UP
OSMOLALITY SERPL: 292 MOSM/KG — SIGNIFICANT CHANGE UP (ref 280–301)
OSMOLALITY UR: 299 MOSM/KG — LOW (ref 300–900)
OSMOLALITY UR: 301 MOSM/KG — SIGNIFICANT CHANGE UP (ref 300–900)
PCO2 BLDCOV: 44 MMHG — SIGNIFICANT CHANGE UP (ref 27–49)
PCO2 BLDV: 38 MMHG — LOW (ref 39–42)
PH BLDV: 7.38 — SIGNIFICANT CHANGE UP (ref 7.32–7.43)
PH UR: 5.5 — SIGNIFICANT CHANGE UP (ref 5–8)
PH UR: 6 — SIGNIFICANT CHANGE UP (ref 5–8)
PHOSPHATE SERPL-MCNC: 4.3 MG/DL — SIGNIFICANT CHANGE UP (ref 2.5–4.5)
PHOSPHATE SERPL-MCNC: 4.8 MG/DL — HIGH (ref 2.5–4.5)
PHOSPHATE SERPL-MCNC: 4.9 MG/DL — HIGH (ref 2.5–4.5)
PHOSPHATE SERPL-MCNC: 5.1 MG/DL — HIGH (ref 2.5–4.5)
PLATELET # BLD AUTO: 231 K/UL — SIGNIFICANT CHANGE UP (ref 150–400)
PO2 BLDCOA: <33 MMHG — SIGNIFICANT CHANGE UP (ref 17–41)
PO2 BLDV: <33 MMHG — LOW (ref 25–45)
POTASSIUM SERPL-MCNC: 5.3 MMOL/L — SIGNIFICANT CHANGE UP (ref 3.5–5.3)
POTASSIUM SERPL-MCNC: 5.7 MMOL/L — HIGH (ref 3.5–5.3)
POTASSIUM SERPL-MCNC: 6 MMOL/L — HIGH (ref 3.5–5.3)
POTASSIUM SERPL-MCNC: 6.1 MMOL/L — HIGH (ref 3.5–5.3)
POTASSIUM SERPL-MCNC: 6.2 MMOL/L — CRITICAL HIGH (ref 3.5–5.3)
POTASSIUM SERPL-MCNC: 6.9 MMOL/L — CRITICAL HIGH (ref 3.5–5.3)
POTASSIUM SERPL-SCNC: 5.3 MMOL/L — SIGNIFICANT CHANGE UP (ref 3.5–5.3)
POTASSIUM SERPL-SCNC: 5.7 MMOL/L — HIGH (ref 3.5–5.3)
POTASSIUM SERPL-SCNC: 6 MMOL/L — HIGH (ref 3.5–5.3)
POTASSIUM SERPL-SCNC: 6.1 MMOL/L — HIGH (ref 3.5–5.3)
POTASSIUM SERPL-SCNC: 6.2 MMOL/L — CRITICAL HIGH (ref 3.5–5.3)
POTASSIUM SERPL-SCNC: 6.9 MMOL/L — CRITICAL HIGH (ref 3.5–5.3)
POTASSIUM UR-SCNC: 58 MMOL/L — SIGNIFICANT CHANGE UP
PROCALCITONIN SERPL-MCNC: 0.81 NG/ML — HIGH (ref 0.02–0.1)
PROT ?TM UR-MCNC: 102 MG/DL — HIGH (ref 0–12)
PROT SERPL-MCNC: 6.3 G/DL — SIGNIFICANT CHANGE UP (ref 6–8.3)
PROT SERPL-MCNC: 6.7 G/DL — SIGNIFICANT CHANGE UP (ref 6–8.3)
PROT SERPL-MCNC: 6.9 G/DL — SIGNIFICANT CHANGE UP (ref 6–8.3)
PROT UR-MCNC: 100 MG/DL
PROT UR-MCNC: >=1000 MG/DL
PROT/CREAT UR-RTO: 1.6 RATIO — HIGH (ref 0–0.2)
PROTHROM AB SERPL-ACNC: 13.3 SEC — HIGH (ref 9.5–13)
RAPID RVP RESULT: SIGNIFICANT CHANGE UP
RBC # BLD: 2.64 M/UL — LOW (ref 3.8–5.2)
RBC # FLD: 14.6 % — HIGH (ref 10.3–14.5)
RBC CASTS # UR COMP ASSIST: 59 /HPF — HIGH (ref 0–4)
RETICS #: 64.7 K/UL — SIGNIFICANT CHANGE UP (ref 25–125)
RETICS/RBC NFR: 2.2 % — SIGNIFICANT CHANGE UP (ref 0.5–2.5)
RH IG SCN BLD-IMP: POSITIVE — SIGNIFICANT CHANGE UP
RH IG SCN BLD-IMP: POSITIVE — SIGNIFICANT CHANGE UP
SAO2 % BLDCOV: 16.6 % — SIGNIFICANT CHANGE UP
SAO2 % BLDV: 17 % — LOW (ref 67–88)
SARS-COV-2 RNA SPEC QL NAA+PROBE: SIGNIFICANT CHANGE UP
SODIUM SERPL-SCNC: 121 MMOL/L — LOW (ref 135–145)
SODIUM SERPL-SCNC: 122 MMOL/L — LOW (ref 135–145)
SODIUM SERPL-SCNC: 123 MMOL/L — LOW (ref 135–145)
SODIUM SERPL-SCNC: 124 MMOL/L — LOW (ref 135–145)
SODIUM SERPL-SCNC: 124 MMOL/L — LOW (ref 135–145)
SODIUM SERPL-SCNC: 127 MMOL/L — LOW (ref 135–145)
SODIUM UR-SCNC: 24 MMOL/L — SIGNIFICANT CHANGE UP
SODIUM UR-SCNC: 38 MMOL/L — SIGNIFICANT CHANGE UP
SP GR SPEC: 1.01 — SIGNIFICANT CHANGE UP (ref 1–1.03)
SP GR SPEC: 1.03 — SIGNIFICANT CHANGE UP (ref 1–1.03)
SQUAMOUS # UR AUTO: 3 /HPF — SIGNIFICANT CHANGE UP (ref 0–5)
T PALLIDUM AB TITR SER: NEGATIVE — SIGNIFICANT CHANGE UP
TIBC SERPL-MCNC: SIGNIFICANT CHANGE UP UG/DL (ref 220–430)
TRANSFERRIN SERPL-MCNC: 250 MG/DL — SIGNIFICANT CHANGE UP (ref 200–360)
TROPONIN T, HIGH SENSITIVITY RESULT: 127 NG/L — CRITICAL HIGH (ref 0–51)
TROPONIN T, HIGH SENSITIVITY RESULT: 131 NG/L — CRITICAL HIGH (ref 0–51)
TROPONIN T, HIGH SENSITIVITY RESULT: 133 NG/L — CRITICAL HIGH (ref 0–51)
UIBC SERPL-MCNC: SIGNIFICANT CHANGE UP UG/DL (ref 110–370)
UROBILINOGEN FLD QL: 0.2 MG/DL — SIGNIFICANT CHANGE UP (ref 0.2–1)
UROBILINOGEN FLD QL: 1 MG/DL — SIGNIFICANT CHANGE UP (ref 0.2–1)
UUN UR-MCNC: 279 MG/DL — SIGNIFICANT CHANGE UP
WBC # BLD: 7.31 K/UL — SIGNIFICANT CHANGE UP (ref 3.8–10.5)
WBC # FLD AUTO: 7.31 K/UL — SIGNIFICANT CHANGE UP (ref 3.8–10.5)
WBC CLUMPS # UR AUTO: PRESENT
WBC UR QL: 203 /HPF — HIGH (ref 0–5)

## 2024-06-05 PROCEDURE — 99223 1ST HOSP IP/OBS HIGH 75: CPT

## 2024-06-05 PROCEDURE — 74176 CT ABD & PELVIS W/O CONTRAST: CPT | Mod: 26,MC

## 2024-06-05 PROCEDURE — 71045 X-RAY EXAM CHEST 1 VIEW: CPT | Mod: 26

## 2024-06-05 PROCEDURE — 93010 ELECTROCARDIOGRAM REPORT: CPT | Mod: 76

## 2024-06-05 PROCEDURE — 93306 TTE W/DOPPLER COMPLETE: CPT | Mod: 26

## 2024-06-05 RX ORDER — CALCIUM GLUCONATE 100 MG/ML
1 VIAL (ML) INTRAVENOUS ONCE
Refills: 0 | Status: COMPLETED | OUTPATIENT
Start: 2024-06-05 | End: 2024-06-05

## 2024-06-05 RX ORDER — FUROSEMIDE 40 MG
40 TABLET ORAL EVERY 12 HOURS
Refills: 0 | Status: DISCONTINUED | OUTPATIENT
Start: 2024-06-05 | End: 2024-06-05

## 2024-06-05 RX ORDER — SODIUM ZIRCONIUM CYCLOSILICATE 10 G/10G
5 POWDER, FOR SUSPENSION ORAL ONCE
Refills: 0 | Status: DISCONTINUED | OUTPATIENT
Start: 2024-06-05 | End: 2024-06-05

## 2024-06-05 RX ORDER — INSULIN HUMAN 100 [IU]/ML
5 INJECTION, SOLUTION SUBCUTANEOUS ONCE
Refills: 0 | Status: COMPLETED | OUTPATIENT
Start: 2024-06-05 | End: 2024-06-05

## 2024-06-05 RX ORDER — IPRATROPIUM/ALBUTEROL SULFATE 18-103MCG
3 AEROSOL WITH ADAPTER (GRAM) INHALATION ONCE
Refills: 0 | Status: DISCONTINUED | OUTPATIENT
Start: 2024-06-05 | End: 2024-06-05

## 2024-06-05 RX ORDER — SODIUM CHLORIDE 9 MG/ML
50 INJECTION INTRAMUSCULAR; INTRAVENOUS; SUBCUTANEOUS ONCE
Refills: 0 | Status: DISCONTINUED | OUTPATIENT
Start: 2024-06-05 | End: 2024-06-05

## 2024-06-05 RX ORDER — FUROSEMIDE 40 MG
80 TABLET ORAL ONCE
Refills: 0 | Status: COMPLETED | OUTPATIENT
Start: 2024-06-05 | End: 2024-06-05

## 2024-06-05 RX ORDER — SODIUM ZIRCONIUM CYCLOSILICATE 10 G/10G
10 POWDER, FOR SUSPENSION ORAL EVERY 8 HOURS
Refills: 0 | Status: DISCONTINUED | OUTPATIENT
Start: 2024-06-05 | End: 2024-06-05

## 2024-06-05 RX ORDER — HEPARIN SODIUM 5000 [USP'U]/ML
5000 INJECTION INTRAVENOUS; SUBCUTANEOUS EVERY 8 HOURS
Refills: 0 | Status: DISCONTINUED | OUTPATIENT
Start: 2024-06-05 | End: 2024-06-08

## 2024-06-05 RX ORDER — LIDOCAINE 4 G/100G
1 CREAM TOPICAL EVERY 24 HOURS
Refills: 0 | Status: DISCONTINUED | OUTPATIENT
Start: 2024-06-05 | End: 2024-06-08

## 2024-06-05 RX ORDER — FUROSEMIDE 40 MG
40 TABLET ORAL ONCE
Refills: 0 | Status: COMPLETED | OUTPATIENT
Start: 2024-06-05 | End: 2024-06-05

## 2024-06-05 RX ORDER — HYDROMORPHONE HYDROCHLORIDE 2 MG/ML
0.2 INJECTION INTRAMUSCULAR; INTRAVENOUS; SUBCUTANEOUS EVERY 6 HOURS
Refills: 0 | Status: DISCONTINUED | OUTPATIENT
Start: 2024-06-05 | End: 2024-06-05

## 2024-06-05 RX ORDER — PANTOPRAZOLE SODIUM 20 MG/1
40 TABLET, DELAYED RELEASE ORAL EVERY 12 HOURS
Refills: 0 | Status: DISCONTINUED | OUTPATIENT
Start: 2024-06-05 | End: 2024-06-08

## 2024-06-05 RX ORDER — HYDROMORPHONE HYDROCHLORIDE 2 MG/ML
0.2 INJECTION INTRAMUSCULAR; INTRAVENOUS; SUBCUTANEOUS EVERY 4 HOURS
Refills: 0 | Status: DISCONTINUED | OUTPATIENT
Start: 2024-06-05 | End: 2024-06-06

## 2024-06-05 RX ORDER — SODIUM ZIRCONIUM CYCLOSILICATE 10 G/10G
10 POWDER, FOR SUSPENSION ORAL ONCE
Refills: 0 | Status: COMPLETED | OUTPATIENT
Start: 2024-06-05 | End: 2024-06-05

## 2024-06-05 RX ORDER — ALBUTEROL 90 UG/1
10 AEROSOL, METERED ORAL ONCE
Refills: 0 | Status: COMPLETED | OUTPATIENT
Start: 2024-06-05 | End: 2024-06-05

## 2024-06-05 RX ORDER — HEPARIN SODIUM 5000 [USP'U]/ML
5000 INJECTION INTRAVENOUS; SUBCUTANEOUS EVERY 8 HOURS
Refills: 0 | Status: DISCONTINUED | OUTPATIENT
Start: 2024-06-05 | End: 2024-06-05

## 2024-06-05 RX ORDER — HYDROMORPHONE HYDROCHLORIDE 2 MG/ML
0.5 INJECTION INTRAMUSCULAR; INTRAVENOUS; SUBCUTANEOUS EVERY 4 HOURS
Refills: 0 | Status: DISCONTINUED | OUTPATIENT
Start: 2024-06-05 | End: 2024-06-06

## 2024-06-05 RX ORDER — ACETAMINOPHEN 500 MG
1000 TABLET ORAL ONCE
Refills: 0 | Status: COMPLETED | OUTPATIENT
Start: 2024-06-05 | End: 2024-06-05

## 2024-06-05 RX ORDER — SODIUM ZIRCONIUM CYCLOSILICATE 10 G/10G
10 POWDER, FOR SUSPENSION ORAL EVERY 8 HOURS
Refills: 0 | Status: DISCONTINUED | OUTPATIENT
Start: 2024-06-05 | End: 2024-06-08

## 2024-06-05 RX ORDER — DEXTROSE 50 % IN WATER 50 %
50 SYRINGE (ML) INTRAVENOUS ONCE
Refills: 0 | Status: COMPLETED | OUTPATIENT
Start: 2024-06-05 | End: 2024-06-05

## 2024-06-05 RX ORDER — SODIUM BICARBONATE 1 MEQ/ML
0.52 SYRINGE (ML) INTRAVENOUS
Qty: 150 | Refills: 0 | Status: DISCONTINUED | OUTPATIENT
Start: 2024-06-05 | End: 2024-06-05

## 2024-06-05 RX ORDER — ACETAMINOPHEN 500 MG
975 TABLET ORAL EVERY 6 HOURS
Refills: 0 | Status: DISCONTINUED | OUTPATIENT
Start: 2024-06-05 | End: 2024-06-05

## 2024-06-05 RX ORDER — CALCIUM CHLORIDE
1000 POWDER (GRAM) MISCELLANEOUS ONCE
Refills: 0 | Status: DISCONTINUED | OUTPATIENT
Start: 2024-06-05 | End: 2024-06-05

## 2024-06-05 RX ORDER — ASPIRIN/CALCIUM CARB/MAGNESIUM 324 MG
81 TABLET ORAL DAILY
Refills: 0 | Status: DISCONTINUED | OUTPATIENT
Start: 2024-06-05 | End: 2024-06-08

## 2024-06-05 RX ORDER — ATORVASTATIN CALCIUM 80 MG/1
40 TABLET, FILM COATED ORAL AT BEDTIME
Refills: 0 | Status: DISCONTINUED | OUTPATIENT
Start: 2024-06-05 | End: 2024-06-08

## 2024-06-05 RX ORDER — THIAMINE MONONITRATE (VIT B1) 100 MG
100 TABLET ORAL EVERY 24 HOURS
Refills: 0 | Status: DISCONTINUED | OUTPATIENT
Start: 2024-06-05 | End: 2024-06-08

## 2024-06-05 RX ORDER — ERTAPENEM SODIUM 1 G/1
500 INJECTION, POWDER, LYOPHILIZED, FOR SOLUTION INTRAMUSCULAR; INTRAVENOUS ONCE
Refills: 0 | Status: COMPLETED | OUTPATIENT
Start: 2024-06-05 | End: 2024-06-05

## 2024-06-05 RX ORDER — ERTAPENEM SODIUM 1 G/1
500 INJECTION, POWDER, LYOPHILIZED, FOR SOLUTION INTRAMUSCULAR; INTRAVENOUS EVERY 24 HOURS
Refills: 0 | Status: DISCONTINUED | OUTPATIENT
Start: 2024-06-05 | End: 2024-06-05

## 2024-06-05 RX ADMIN — PANTOPRAZOLE SODIUM 40 MILLIGRAM(S): 20 TABLET, DELAYED RELEASE ORAL at 17:40

## 2024-06-05 RX ADMIN — Medication 50 MILLILITER(S): at 00:54

## 2024-06-05 RX ADMIN — Medication 1000 MILLIGRAM(S): at 22:49

## 2024-06-05 RX ADMIN — Medication 400 MILLIGRAM(S): at 22:29

## 2024-06-05 RX ADMIN — HEPARIN SODIUM 5000 UNIT(S): 5000 INJECTION INTRAVENOUS; SUBCUTANEOUS at 06:24

## 2024-06-05 RX ADMIN — Medication 80 MILLIGRAM(S): at 18:28

## 2024-06-05 RX ADMIN — LIDOCAINE 1 PATCH: 4 CREAM TOPICAL at 18:23

## 2024-06-05 RX ADMIN — INSULIN HUMAN 5 UNIT(S): 100 INJECTION, SOLUTION SUBCUTANEOUS at 01:11

## 2024-06-05 RX ADMIN — SODIUM ZIRCONIUM CYCLOSILICATE 10 GRAM(S): 10 POWDER, FOR SUSPENSION ORAL at 18:18

## 2024-06-05 RX ADMIN — HEPARIN SODIUM 5000 UNIT(S): 5000 INJECTION INTRAVENOUS; SUBCUTANEOUS at 13:48

## 2024-06-05 RX ADMIN — Medication 250 MEQ/KG/HR: at 01:11

## 2024-06-05 RX ADMIN — PANTOPRAZOLE SODIUM 40 MILLIGRAM(S): 20 TABLET, DELAYED RELEASE ORAL at 06:29

## 2024-06-05 RX ADMIN — ERTAPENEM SODIUM 100 MILLIGRAM(S): 1 INJECTION, POWDER, LYOPHILIZED, FOR SOLUTION INTRAMUSCULAR; INTRAVENOUS at 13:15

## 2024-06-05 RX ADMIN — LIDOCAINE 1 PATCH: 4 CREAM TOPICAL at 06:23

## 2024-06-05 RX ADMIN — ALBUTEROL 10 MILLIGRAM(S): 90 AEROSOL, METERED ORAL at 01:13

## 2024-06-05 RX ADMIN — Medication 81 MILLIGRAM(S): at 13:15

## 2024-06-05 RX ADMIN — SODIUM ZIRCONIUM CYCLOSILICATE 10 GRAM(S): 10 POWDER, FOR SUSPENSION ORAL at 22:06

## 2024-06-05 RX ADMIN — Medication 400 MILLIGRAM(S): at 11:58

## 2024-06-05 RX ADMIN — Medication 1000 MILLIGRAM(S): at 07:05

## 2024-06-05 RX ADMIN — LIDOCAINE 1 PATCH: 4 CREAM TOPICAL at 11:45

## 2024-06-05 RX ADMIN — Medication 0.52 MEQ/KG/HR: at 01:44

## 2024-06-05 RX ADMIN — Medication 40 MILLIGRAM(S): at 00:55

## 2024-06-05 RX ADMIN — ATORVASTATIN CALCIUM 40 MILLIGRAM(S): 80 TABLET, FILM COATED ORAL at 21:55

## 2024-06-05 RX ADMIN — LIDOCAINE 1 PATCH: 4 CREAM TOPICAL at 07:05

## 2024-06-05 RX ADMIN — Medication 100 MILLIGRAM(S): at 13:14

## 2024-06-05 RX ADMIN — Medication 1000 MILLIGRAM(S): at 12:28

## 2024-06-05 RX ADMIN — Medication 40 MILLIGRAM(S): at 14:18

## 2024-06-05 RX ADMIN — Medication 15 MILLIGRAM(S): at 00:57

## 2024-06-05 RX ADMIN — Medication 400 MILLIGRAM(S): at 06:16

## 2024-06-05 RX ADMIN — Medication 1 GRAM(S): at 02:08

## 2024-06-05 RX ADMIN — HEPARIN SODIUM 5000 UNIT(S): 5000 INJECTION INTRAVENOUS; SUBCUTANEOUS at 21:37

## 2024-06-05 RX ADMIN — CEFEPIME 2000 MILLIGRAM(S): 1 INJECTION, POWDER, FOR SOLUTION INTRAMUSCULAR; INTRAVENOUS at 00:57

## 2024-06-05 RX ADMIN — Medication 100 GRAM(S): at 13:45

## 2024-06-05 RX ADMIN — Medication 100 GRAM(S): at 18:41

## 2024-06-05 RX ADMIN — SODIUM ZIRCONIUM CYCLOSILICATE 10 GRAM(S): 10 POWDER, FOR SUSPENSION ORAL at 06:53

## 2024-06-05 RX ADMIN — SODIUM ZIRCONIUM CYCLOSILICATE 10 GRAM(S): 10 POWDER, FOR SUSPENSION ORAL at 13:36

## 2024-06-05 RX ADMIN — Medication 100 GRAM(S): at 01:22

## 2024-06-05 RX ADMIN — Medication 50 MILLILITER(S): at 04:35

## 2024-06-05 RX ADMIN — INSULIN HUMAN 5 UNIT(S): 100 INJECTION, SOLUTION SUBCUTANEOUS at 04:34

## 2024-06-05 NOTE — ED PROVIDER NOTE - CRITICAL CARE ATTENDING CONTRIBUTION TO CARE
critical Care Procedure Note  Authorized and Performed by:  DO RL Sol  Total critical care time: Approximately   40 minutes  Due to a high probability of clinically significant, life threatening deterioration, the patient required my highest level of preparedness to intervene emergently and I personally spent this critical care time directly and personally managing the patient. This critical care time included obtaining a history; examining the patient; pulse oximetry; ordering and review of studies; arranging urgent treatment with development of a management plan; evaluation of patient's response to treatment; frequent reassessment; and, discussions with other providers.  This critical care time was performed to assess and manage the high probability of imminent, life-threatening deterioration that could result in multi-organ failure. It was exclusive of separately billable procedures and treating other patients and teaching time.  Please see MDM section and the rest of the note for further information on patient assessment and treatment.

## 2024-06-05 NOTE — H&P ADULT - PROBLEM SELECTOR PLAN 2
AMS likely iso renal failure with uremia and electrolyte abnormalities. Infectious etiology possible although no leukocytosis, UA w/o nitrite, WBC or bacteria, RVP negative, and Chest imaging w/o infiltrates. No report of fall or head trauma.   - tx hyperkalemia and renal failure as below  - monitor for improvement AMS likely iso renal failure with uremia and electrolyte abnormalities. Infectious etiology possible although no leukocytosis, UA w/o nitrite, RVP negative, and Chest imaging w/o infiltrates. No report of fall or head trauma.   - tx hyperkalemia and renal failure as below  - f/u B12  - f/u TSH  - f/u RPR  - f/u Vitamin B1  - monitor for improvement AMS likely iso renal failure with uremia and electrolyte abnormalities. Infectious etiology possible although no leukocytosis, UA w/o nitrite, RVP negative, and Chest imaging w/o infiltrates. No report of fall or head trauma.   - tx hyperkalemia and renal failure as below  - f/u B12  - f/u TSH  - f/u RPR  - f/u Vitamin B1  - PT/OT  - nutrition consult

## 2024-06-05 NOTE — DIETITIAN INITIAL EVALUATION ADULT - OTHER INFO
89-year-old female history of chronic lower back pain c/b left foot drop (2/2 herniated disc repair sx in 2010), now with spinal cord stimulator, hypertension, breast ca (2014, s/p R breast lumpectomy + radiation but no chemo), Afib (not on AC), HFrEF (baseline EF is 15%) CKD (baseline cr 1.3-1.5), chronic anemia (baseline hgb 8), obstructive uropathy with recently placed mackenzie 2 weeks ago at [facility] and several recent hospitalizations at [facility] in May for hyponatremia and urinary retention who presents today with worsening back pain, lethargy, confusion, nausea, and diarrhea, found to have uremia and acute on chronic renal failure and hyperkalemia.     Pt assessed at bedside on 7LA. Rx and labs reviewed. Pt presents with no overt signs of nutritional wasting. Pt received resting in bed with family at bedside providing subjective nutrition hx. Pt reportedly eating well and following a Renal diet at this; family notes pt is not on HD and no plans at this time. Unknown usual body weight; current body weight of 124 pounds, family states this wt seems lower than actual. Monitor wts weekly and assess for significant changes. PTA, pt was starting to eat more and gain wt; family notes pt with intake of 3 meals/day compared to usual 1-2 meals/day. Food preferences discussed and updated with kitchen. No complaints of nausea/vomiting/constipation/diarrhea or difficult chew/swallow. NKA to food. RDN will continue to reassess, intervene, and monitor as appropriate.     Pain: C/o pain, unspecified  GI: Abdomen non-distended/non-tender, +BS x4, last bowel movement PTA   Skin: Warm/Dry/Intact, no edema assessed

## 2024-06-05 NOTE — PROGRESS NOTE ADULT - PROBLEM SELECTOR PLAN 7
Not meeting SIRS criteria in the ED, Had mackenzie placed 2 wks ago at [facility] for obstructive uropathy, pt was discharged to [facility] with mackenzie, which was exchanged in ED. Initial UA sent had mod LE. Prior urine culture recently at [facility] growing Pansensitive E Coli.   s/p dose of cefepime in ED, monitor off antibiotics unless decompensates      - repeat UA w/ Cx  - f/u blood cultures   - Start CTX for now, can change based on Cx results

## 2024-06-05 NOTE — CONSULT NOTE ADULT - SUBJECTIVE AND OBJECTIVE BOX
NEPHROLOGY SERVICE INITIAL CONSULT NOTE    Hanna Antoine is a 89 yo F w/ PMH of ?CKD3-4 (recent outpatient sCr 2.56 however reportedly had sCr 1.5 at discharge from [facility] approx 1 week ago), s/p mackenzie for obstructive uropathy, HTN, HLD, HFrEF, afib, foot drop s/p spinal cord stimulator, breast ca s/p lumpectomy/RT, presenting with worsening back pain, lethargy, confusion, nausea, and diarrhea. Here patient found to have sCr 2.68 with K 6.9, Na 127, bicarb 19 with AG 13 and venous pH 7.32. SBP 100s. Mackenzie present on admission, UOP 40-60cc/hr recorded thus far. CT a/p without hydronephrosis, bladder collapsed around mackenzie, 1.2cm L renal nodule and 5.3 cm R renal cyst. UA with hematuria, pyuria in setting of mackenzie with UPCR 1.6. Potassium treated with lokelma, furosemide, albuterol, calcium, insulin/dextrose now improved to 5.7. Sodium decreased to 123. Patient lethargic and unable to provide history, obtained from chart review. Nephrology consulted for further management of TODD on CKD and electrolyte disturbance.    REVIEW OF SYSTEMS:   Otherwise negative except as specified in HPI    PAST MEDICAL AND SURGICAL HISTORY:   PAST MEDICAL & SURGICAL HISTORY:      FAMILY HISTORY:  FAMILY HISTORY:      Otherwise Noncontributory to current admission    SOCIAL HISTORY:  Tobacco use: Denies  EtOH use: Denies  Illicit drug use: Denies    HOME MEDICATIONS:      ACTIVE MEDICATIONS:  MEDICATIONS  (STANDING):  aspirin  chewable 81 milliGRAM(s) Oral daily  atorvastatin 40 milliGRAM(s) Oral at bedtime  ertapenem  IVPB 500 milliGRAM(s) IV Intermittent once  ertapenem  IVPB 500 milliGRAM(s) IV Intermittent every 24 hours  furosemide   Injectable 40 milliGRAM(s) IV Push every 12 hours  heparin   Injectable 5000 Unit(s) SubCutaneous every 8 hours  lidocaine   4% Patch 1 Patch Transdermal every 24 hours  pantoprazole    Tablet 40 milliGRAM(s) Oral every 12 hours  sodium zirconium cyclosilicate 10 Gram(s) Oral every 8 hours  thiamine 100 milliGRAM(s) Oral every 24 hours    MEDICATIONS  (PRN):  acetaminophen     Tablet .. 975 milliGRAM(s) Oral every 6 hours PRN Temp greater or equal to 38C (100.4F), Mild Pain (1 - 3)  HYDROmorphone  Injectable 0.2 milliGRAM(s) IV Push every 4 hours PRN Moderate Pain (4 - 6)  HYDROmorphone  Injectable 0.5 milliGRAM(s) IV Push every 4 hours PRN Severe Pain (7 - 10)      ALLERGIES:  Allergies    Aldactone (Rash)    Intolerances        VITAL SIGNS:  Vital Signs Last 24 Hrs  T(C): 36.4 (05 Jun 2024 10:00), Max: 37.9 (04 Jun 2024 22:02)  T(F): 97.6 (05 Jun 2024 10:00), Max: 100.3 (04 Jun 2024 22:02)  HR: 76 (05 Jun 2024 10:49) (76 - 97)  BP: 95/50 (05 Jun 2024 10:49) (92/54 - 111/66)  BP(mean): 67 (05 Jun 2024 10:49) (67 - 82)  RR: 16 (05 Jun 2024 10:49) (16 - 18)  SpO2: 100% (05 Jun 2024 10:49) (97% - 100%)    Parameters below as of 05 Jun 2024 10:49  Patient On (Oxygen Delivery Method): room air        06-04-24 @ 07:01  -  06-05-24 @ 07:00  --------------------------------------------------------  IN:    IV PiggyBack: 100 mL    Oral Fluid: 180 mL  Total IN: 280 mL    OUT:    Indwelling Catheter - Urethral (mL): 100 mL  Total OUT: 100 mL    Total NET: 180 mL      06-05-24 @ 07:01  -  06-05-24 @ 13:10  --------------------------------------------------------  IN:  Total IN: 0 mL    OUT:    Indwelling Catheter - Urethral (mL): 45 mL  Total OUT: 45 mL    Total NET: -45 mL          PHYSICAL EXAM:  Constitutional: Lethargic, no acute distress, lying in bed  HEENT: NCAT, neck supple  Respiratory: CTAB, no w/r/r  Cardiac: Regular rate, no murmur  Gastrointestinal: abdomen soft, NT/ND  Genitourinary: mackenzie in place  Extremities: cool extremities, no peripheral edema  Dermatologic: no rashes on exposed skin  Neurologic: Lethargic, opens eyes to verbal stimuli, grimaces during exam    LABS:                        7.5    7.31  )-----------( 231      ( 05 Jun 2024 04:57 )             22.9     06-05    123<L>  |  93<L>  |  82<H>  ----------------------------<  141<H>  5.7<H>   |  17<L>  |  2.63<H>    Ca    8.9      05 Jun 2024 04:57  Phos  4.3     06-05  Mg     2.1     06-05    TPro  6.3  /  Alb  3.5  /  TBili  0.5  /  DBili  x   /  AST  27  /  ALT  27  /  AlkPhos  88  06-05    PT/INR - ( 05 Jun 2024 04:57 )   PT: 13.3 sec;   INR: 1.17          PTT - ( 05 Jun 2024 04:57 )  PTT:33.6 sec  Urinalysis Basic - ( 05 Jun 2024 04:57 )    Color: x / Appearance: x / SG: x / pH: x  Gluc: 141 mg/dL / Ketone: x  / Bili: x / Urobili: x   Blood: x / Protein: x / Nitrite: x   Leuk Esterase: x / RBC: x / WBC x   Sq Epi: x / Non Sq Epi: x / Bacteria: x      CARDIAC MARKERS ( 05 Jun 2024 04:57 )  x     / x     / 78 U/L / x     / 1.9 ng/mL  CARDIAC MARKERS ( 05 Jun 2024 00:22 )  x     / x     / 90 U/L / x     / 1.8 ng/mL      CAPILLARY BLOOD GLUCOSE      POCT Blood Glucose.: 134 mg/dL (05 Jun 2024 04:26)  POCT Blood Glucose.: 242 mg/dL (05 Jun 2024 01:17)  POCT Blood Glucose.: 147 mg/dL (05 Jun 2024 00:45)          RADIOLOGY & ADDITIONAL TESTS: Reviewed. NEPHROLOGY SERVICE INITIAL CONSULT NOTE    Hanna Antoine is a 89 yo F w/ PMH of ?CKD3-4 (recent outpatient sCr 2.56 however reportedly had sCr 1.5 at discharge from [facility] approx 1 week ago), s/p mackenzie for obstructive uropathy, HTN, HLD, HFrEF, afib, foot drop s/p spinal cord stimulator, breast ca s/p lumpectomy/RT, presenting with worsening back pain, lethargy, confusion, nausea, and diarrhea. Here patient found to have sCr 2.68 with K 6.9, Na 127, bicarb 19 with AG 13 and venous pH 7.32. SBP 100s. Mackenzie present on admission, UOP 40-60cc/hr recorded thus far. CT a/p without hydronephrosis, bladder collapsed around mackenzie, 1.2cm L renal nodule and 5.3 cm R renal cyst. UA with hematuria, pyuria in setting of mackenzie with UPCR 1.6. Potassium treated with lokelma, furosemide, albuterol, calcium, insulin/dextrose now improved to 5.7. Sodium decreased to 123. Patient lethargic and unable to provide history, obtained from chart review. Nephrology consulted for further management of TODD on CKD and electrolyte disturbance.    REVIEW OF SYSTEMS:   Otherwise negative except as specified in HPI    PAST MEDICAL AND SURGICAL HISTORY:   PAST MEDICAL & SURGICAL HISTORY:      FAMILY HISTORY:  FAMILY HISTORY:      Otherwise Noncontributory to current admission    SOCIAL HISTORY:  Tobacco use: Denies  EtOH use: Denies  Illicit drug use: Denies    HOME MEDICATIONS:      ACTIVE MEDICATIONS:  MEDICATIONS  (STANDING):  aspirin  chewable 81 milliGRAM(s) Oral daily  atorvastatin 40 milliGRAM(s) Oral at bedtime  ertapenem  IVPB 500 milliGRAM(s) IV Intermittent once  ertapenem  IVPB 500 milliGRAM(s) IV Intermittent every 24 hours  furosemide   Injectable 40 milliGRAM(s) IV Push every 12 hours  heparin   Injectable 5000 Unit(s) SubCutaneous every 8 hours  lidocaine   4% Patch 1 Patch Transdermal every 24 hours  pantoprazole    Tablet 40 milliGRAM(s) Oral every 12 hours  sodium zirconium cyclosilicate 10 Gram(s) Oral every 8 hours  thiamine 100 milliGRAM(s) Oral every 24 hours    MEDICATIONS  (PRN):  acetaminophen     Tablet .. 975 milliGRAM(s) Oral every 6 hours PRN Temp greater or equal to 38C (100.4F), Mild Pain (1 - 3)  HYDROmorphone  Injectable 0.2 milliGRAM(s) IV Push every 4 hours PRN Moderate Pain (4 - 6)  HYDROmorphone  Injectable 0.5 milliGRAM(s) IV Push every 4 hours PRN Severe Pain (7 - 10)      ALLERGIES:  Allergies    Aldactone (Rash)    Intolerances        VITAL SIGNS:  Vital Signs Last 24 Hrs  T(C): 36.4 (05 Jun 2024 10:00), Max: 37.9 (04 Jun 2024 22:02)  T(F): 97.6 (05 Jun 2024 10:00), Max: 100.3 (04 Jun 2024 22:02)  HR: 76 (05 Jun 2024 10:49) (76 - 97)  BP: 95/50 (05 Jun 2024 10:49) (92/54 - 111/66)  BP(mean): 67 (05 Jun 2024 10:49) (67 - 82)  RR: 16 (05 Jun 2024 10:49) (16 - 18)  SpO2: 100% (05 Jun 2024 10:49) (97% - 100%)    Parameters below as of 05 Jun 2024 10:49  Patient On (Oxygen Delivery Method): room air        06-04-24 @ 07:01  -  06-05-24 @ 07:00  --------------------------------------------------------  IN:    IV PiggyBack: 100 mL    Oral Fluid: 180 mL  Total IN: 280 mL    OUT:    Indwelling Catheter - Urethral (mL): 100 mL  Total OUT: 100 mL    Total NET: 180 mL      06-05-24 @ 07:01  -  06-05-24 @ 13:10  --------------------------------------------------------  IN:  Total IN: 0 mL    OUT:    Indwelling Catheter - Urethral (mL): 45 mL  Total OUT: 45 mL    Total NET: -45 mL          PHYSICAL EXAM:  Constitutional: Lethargic, no acute distress, lying in bed  HEENT: NCAT, neck supple  Respiratory: CTAB, no w/r/r  Cardiac: Regular rate, no murmur  Gastrointestinal: abdomen soft, NT/ND  Genitourinary: mackenzie in place  Extremities: cool extremities, no peripheral edema  Dermatologic: no rashes on exposed skin  Neurologic: Lethargic, opens eyes to verbal stimuli, grimaces during exam  POCUS: Grossly reduced EF with ventricular hypertrophy, enlarged IVC without respiratory variation, VEXUS of portal and hepatic veins consistent with congestion.    LABS:                        7.5    7.31  )-----------( 231      ( 05 Jun 2024 04:57 )             22.9     06-05    123<L>  |  93<L>  |  82<H>  ----------------------------<  141<H>  5.7<H>   |  17<L>  |  2.63<H>    Ca    8.9      05 Jun 2024 04:57  Phos  4.3     06-05  Mg     2.1     06-05    TPro  6.3  /  Alb  3.5  /  TBili  0.5  /  DBili  x   /  AST  27  /  ALT  27  /  AlkPhos  88  06-05    PT/INR - ( 05 Jun 2024 04:57 )   PT: 13.3 sec;   INR: 1.17          PTT - ( 05 Jun 2024 04:57 )  PTT:33.6 sec  Urinalysis Basic - ( 05 Jun 2024 04:57 )    Color: x / Appearance: x / SG: x / pH: x  Gluc: 141 mg/dL / Ketone: x  / Bili: x / Urobili: x   Blood: x / Protein: x / Nitrite: x   Leuk Esterase: x / RBC: x / WBC x   Sq Epi: x / Non Sq Epi: x / Bacteria: x      CARDIAC MARKERS ( 05 Jun 2024 04:57 )  x     / x     / 78 U/L / x     / 1.9 ng/mL  CARDIAC MARKERS ( 05 Jun 2024 00:22 )  x     / x     / 90 U/L / x     / 1.8 ng/mL      CAPILLARY BLOOD GLUCOSE      POCT Blood Glucose.: 134 mg/dL (05 Jun 2024 04:26)  POCT Blood Glucose.: 242 mg/dL (05 Jun 2024 01:17)  POCT Blood Glucose.: 147 mg/dL (05 Jun 2024 00:45)          RADIOLOGY & ADDITIONAL TESTS: Reviewed. NEPHROLOGY SERVICE INITIAL CONSULT NOTE    Hanna Antoine is a 89 yo F w/ PMH of ?CKD3-4 (recent outpatient sCr 2.56 however reportedly had sCr 1.5 at discharge from [facility] approx 1 week ago), s/p mackenzie for obstructive uropathy, HTN, HLD, HFrEF, afib, foot drop s/p spinal cord stimulator, breast ca s/p lumpectomy/RT, presenting with worsening back pain, lethargy, confusion, nausea, and diarrhea. Here patient found to have sCr 2.68 with K 6.9, Na 127, bicarb 19 with AG 13 and venous pH 7.32. SBP 100s. Mackenzie present on admission, UOP 40-60cc/hr recorded thus far. CT a/p without hydronephrosis, bladder collapsed around mackenzie, 1.2cm L renal nodule and 5.3 cm R renal cyst. UA with hematuria, pyuria in setting of mackenzie with UPCR 1.6. Potassium treated with lokelma, furosemide, albuterol, calcium, insulin/dextrose now improved to 5.7. Sodium decreased to 123. Patient lethargic and unable to provide history, obtained from chart review. Nephrology consulted for further management of TODD on CKD and electrolyte disturbance.    REVIEW OF SYSTEMS:   Otherwise negative except as specified in HPI    PAST MEDICAL AND SURGICAL HISTORY:   PAST MEDICAL & SURGICAL HISTORY:      FAMILY HISTORY:  FAMILY HISTORY:      Otherwise Noncontributory to current admission    SOCIAL HISTORY:  Tobacco use: Denies  EtOH use: Denies  Illicit drug use: Denies    HOME MEDICATIONS:      ACTIVE MEDICATIONS:  MEDICATIONS  (STANDING):  aspirin  chewable 81 milliGRAM(s) Oral daily  atorvastatin 40 milliGRAM(s) Oral at bedtime  ertapenem  IVPB 500 milliGRAM(s) IV Intermittent once  ertapenem  IVPB 500 milliGRAM(s) IV Intermittent every 24 hours  furosemide   Injectable 40 milliGRAM(s) IV Push every 12 hours  heparin   Injectable 5000 Unit(s) SubCutaneous every 8 hours  lidocaine   4% Patch 1 Patch Transdermal every 24 hours  pantoprazole    Tablet 40 milliGRAM(s) Oral every 12 hours  sodium zirconium cyclosilicate 10 Gram(s) Oral every 8 hours  thiamine 100 milliGRAM(s) Oral every 24 hours    MEDICATIONS  (PRN):  acetaminophen     Tablet .. 975 milliGRAM(s) Oral every 6 hours PRN Temp greater or equal to 38C (100.4F), Mild Pain (1 - 3)  HYDROmorphone  Injectable 0.2 milliGRAM(s) IV Push every 4 hours PRN Moderate Pain (4 - 6)  HYDROmorphone  Injectable 0.5 milliGRAM(s) IV Push every 4 hours PRN Severe Pain (7 - 10)      ALLERGIES:  Allergies    Aldactone (Rash)    Intolerances        VITAL SIGNS:  Vital Signs Last 24 Hrs  T(C): 36.4 (05 Jun 2024 10:00), Max: 37.9 (04 Jun 2024 22:02)  T(F): 97.6 (05 Jun 2024 10:00), Max: 100.3 (04 Jun 2024 22:02)  HR: 76 (05 Jun 2024 10:49) (76 - 97)  BP: 95/50 (05 Jun 2024 10:49) (92/54 - 111/66)  BP(mean): 67 (05 Jun 2024 10:49) (67 - 82)  RR: 16 (05 Jun 2024 10:49) (16 - 18)  SpO2: 100% (05 Jun 2024 10:49) (97% - 100%)    Parameters below as of 05 Jun 2024 10:49  Patient On (Oxygen Delivery Method): room air        06-04-24 @ 07:01  -  06-05-24 @ 07:00  --------------------------------------------------------  IN:    IV PiggyBack: 100 mL    Oral Fluid: 180 mL  Total IN: 280 mL    OUT:    Indwelling Catheter - Urethral (mL): 100 mL  Total OUT: 100 mL    Total NET: 180 mL      06-05-24 @ 07:01  -  06-05-24 @ 13:10  --------------------------------------------------------  IN:  Total IN: 0 mL    OUT:    Indwelling Catheter - Urethral (mL): 45 mL  Total OUT: 45 mL    Total NET: -45 mL          PHYSICAL EXAM:  Constitutional: Lethargic, no acute distress, lying in bed  HEENT: NCAT, neck supple  Respiratory: CTAB, no w/r/r  Cardiac: Regular rate, no murmur  Gastrointestinal: abdomen soft, NT/ND  Genitourinary: mackenzie in place  Extremities: cool extremities, no peripheral edema  Dermatologic: no rashes on exposed skin  Neurologic: Lethargic, opens eyes to verbal stimuli, grimaces during exam  POCUS: Grossly reduced EF with LV hypertrophy, enlarged IVC without respiratory variation, VEXUS of portal and hepatic veins consistent with congestion.    LABS:                        7.5    7.31  )-----------( 231      ( 05 Jun 2024 04:57 )             22.9     06-05    123<L>  |  93<L>  |  82<H>  ----------------------------<  141<H>  5.7<H>   |  17<L>  |  2.63<H>    Ca    8.9      05 Jun 2024 04:57  Phos  4.3     06-05  Mg     2.1     06-05    TPro  6.3  /  Alb  3.5  /  TBili  0.5  /  DBili  x   /  AST  27  /  ALT  27  /  AlkPhos  88  06-05    PT/INR - ( 05 Jun 2024 04:57 )   PT: 13.3 sec;   INR: 1.17          PTT - ( 05 Jun 2024 04:57 )  PTT:33.6 sec  Urinalysis Basic - ( 05 Jun 2024 04:57 )    Color: x / Appearance: x / SG: x / pH: x  Gluc: 141 mg/dL / Ketone: x  / Bili: x / Urobili: x   Blood: x / Protein: x / Nitrite: x   Leuk Esterase: x / RBC: x / WBC x   Sq Epi: x / Non Sq Epi: x / Bacteria: x      CARDIAC MARKERS ( 05 Jun 2024 04:57 )  x     / x     / 78 U/L / x     / 1.9 ng/mL  CARDIAC MARKERS ( 05 Jun 2024 00:22 )  x     / x     / 90 U/L / x     / 1.8 ng/mL      CAPILLARY BLOOD GLUCOSE      POCT Blood Glucose.: 134 mg/dL (05 Jun 2024 04:26)  POCT Blood Glucose.: 242 mg/dL (05 Jun 2024 01:17)  POCT Blood Glucose.: 147 mg/dL (05 Jun 2024 00:45)          RADIOLOGY & ADDITIONAL TESTS: Reviewed.

## 2024-06-05 NOTE — H&P ADULT - NSHPPHYSICALEXAM_GEN_ALL_CORE
PHYSICAL EXAM:    Constitutional: Laying in bed, no acute distress, appears stated age, well nourished   Neurological: AAOx1-2, answers all questions appropriately, CN Grossly intact, can move all 4 extremities  HEENT: PERRL, EOMI, sclera non-icteric, neck supple, no masses, +JVD, MMM,  Respiratory: No labored breathing or respiratory distress, CTA b/l, good air entry b/l, no wheezing,  no crackles/rales, without accessory muscle use and no intercostal retractions  Cardiovascular: irregular rhythm, normal S1S2, no murmurs or rubs   Gastrointestinal: soft, tender to deep palpation, non distended, neg hepatojugular reflex, no masses or overt organomegaly palpable, BS normal  MSK: Tenderness over the lumbar spine  Extremities: Warm, well perfused, pulses equal bilateral upper and lower extremities, no edema,   Skin: Normal temperature, warm, dry. No rashes or lesions PHYSICAL EXAM:    Constitutional: appears in distress, eyes squeezed shut, laying in bed  Neurological: AAOx1, CN grossly intact, normal UE strength, reduced LE strength (L weaker than R)  HEENT: PERRL, EOMI, sclera non-icteric, neck supple, +JVD, MMM,  Respiratory: CTA b/l, good air entry b/l, no wheezing, no crackles/rales, without accessory muscle use and no intercostal retractions  Cardiovascular: irregular rhythm, normal S1S2, no murmurs or rubs   Gastrointestinal: soft, tender to deep palpation, non distended, neg hepatojugular reflex, no masses or overt organomegaly palpable, BS normal  MSK: Tenderness over the lumbar spine  Extremities: cool LE, with weak pulses, no edema, muscle atrophy of distal left LE PHYSICAL EXAM:    Constitutional: appears in distress, eyes squeezed shut, laying in bed  Neurological: AAOx1, CN grossly intact, normal UE strength, reduced LE strength (L weaker than R)  HEENT: PERRL, EOMI, sclera non-icteric, neck supple, +JVD, MMM,  Respiratory: CTA b/l, good air entry b/l, no wheezing, no crackles/rales, without accessory muscle use and no intercostal retractions  Cardiovascular: irregular rhythm, normal S1S2, no murmurs or rubs   Gastrointestinal: soft, tender to deep palpation, non distended, neg hepatojugular reflex, no masses or overt organomegaly palpable, BS normal  MSK: Tenderness over the lumbar spine  Extremities: cool LE, dorsalis pedis pulses not palpable, no LE edema, muscle atrophy of distal left LE PHYSICAL EXAM:    Constitutional: appears in distress, eyes squeezed shut, laying in bed  Neurological: AAOx1, CN grossly intact, normal UE strength, reduced LE strength (L weaker than R)  HEENT: PERRL, EOMI, sclera non-icteric, neck supple, +JVD, MMM,  Respiratory: CTA b/l, good air entry b/l, no wheezing, no crackles/rales, without accessory muscle use and no intercostal retractions  Cardiovascular: irregular rhythm, normal S1S2, no murmurs or rubs   Gastrointestinal: soft, tender to deep palpation, non distended, neg hepatojugular reflex, no masses or overt organomegaly palpable, BS normal  MSK: Tenderness over the lumbar spine  Extremities: cool LE, dorsalis pedis pulses not palpable, feet without ulcers however hyperkeratosis present, no LE edema, muscle atrophy of distal left LE

## 2024-06-05 NOTE — ED PROVIDER NOTE - OBJECTIVE STATEMENT
89-year-old female history of chronic lower back pain with spinal cord stimulator, hypertension on metoprolol CHF previously on Entresto CKD baseline  BUN/creatinine hemoglobin 66/1.28 and 7.8 with recent admission to [facility] for hyponatremia renal failure and hyperkalemia discharged with Zepeda catheter last changed 2 weeks ago here today with body aches  acute on chronic lower back pain 2 episodes of emesis as well as loose stools and decreased ability to ambulate/generalized weakness.  Patient denies chest pain or shortness of breath but patient's daughter at the bedside states that patient has been breathing more heavily over the past 2 days.  No fever chills cough.  Does not have pain in the Zepeda catheter site but is complaining of lower abdominal discomfort as well.  No hematochezia or melena   But has had prior GI bleeding.  Is on aspirin daily as well as metoprolol.  Patient wishes to be DNR but not DNI is open to medications that would prevent her heart from stopping but does not want to be shocked with electricity or to have CPR performed, is open to having a trial intubation if needed.

## 2024-06-05 NOTE — H&P ADULT - PROBLEM SELECTOR PLAN 8
Patient has chronic back pain and a spinal stimulator. Now complaining of significant back pain on admission, appears musculoskeletal in nature. Concerning for compression fracture vs muscle spasm vs malignancy vs kidney stone   - f/u CTAP  - Lidocaine patch  - Tylenol  - Avoid NSAIDS  - 0.4 IV dilaudid for severe pain

## 2024-06-05 NOTE — ED PROVIDER NOTE - CLINICAL SUMMARY MEDICAL DECISION MAKING FREE TEXT BOX
89-year-old female DNR but not DNI here for generalized weakness nausea vomiting loose stools and lower back pain as well as lower abdominal pain will evaluate for complicated UTI acute on chronic renal failure symptomatic anemia atypical ACS   EKG sinus tachycardia 96 left bundle branch block with T wave inversions 5 and 6 as well as lead II no STEMI no old to compare to   plan for CBC CMP lactate cultures UA IV antibiotics guaiac POCUS echo EKG   POCUS  echo: Severely depressed LV function ejection fraction roughly 5 to 10% no B-lines plethoric IVC no RV strain or pericardial effusion

## 2024-06-05 NOTE — PROVIDER CONTACT NOTE (CRITICAL VALUE NOTIFICATION) - ACTION/TREATMENT ORDERED:
patient treatment in prog
pt on monitor, treatment in prog with ICU resident and RN at bedside.
treatment inprog with MD and RN at bedside

## 2024-06-05 NOTE — H&P ADULT - CONVERSATION DETAILS
Per conversation with ICU consult, patient wishes to be DNR but not DNI is open to medications that would prevent her heart from stopping but does not want to be shocked with electricity or to have CPR performed, is open to having a trial intubation if needed. VIBHA signed.

## 2024-06-05 NOTE — H&P ADULT - ASSESSMENT
91J  89-year-old female history of chronic lower back pain c/b left foot drop with spinal cord stimulator, hypertension, Afib (not on AC), HFrEF (baseline EF is 15%) CKD (baseline cr 1.3-1.5), chronic anemia (baseline hgb 8), obstructive uropathy with recently placed mackenzie 2 weeks ago at [facility] and several recent hospitalizations at [facility] in May for hyponatremia and urinary retention who presents today with worsening back pain, lethargy, confusion, nausea, and diarrhea, found to have uremia and acute on chronic renal failure and hyperkalemia.    89-year-old female history of chronic lower back pain c/b left foot drop (2/2 herniated disc repair sx in 2010), now with spinal cord stimulator, hypertension, breast ca (2014, s/p R breast lumpectomy + radiation but no chemo), Afib (not on AC), HFrEF (baseline EF is 15%) CKD (baseline cr 1.3-1.5), chronic anemia (baseline hgb 8), obstructive uropathy with recently placed mackenzie 2 weeks ago at [facility] and several recent hospitalizations at [facility] in May for hyponatremia and urinary retention who presents today with worsening back pain, lethargy, confusion, nausea, and diarrhea, found to have uremia and acute on chronic renal failure and hyperkalemia.

## 2024-06-05 NOTE — H&P ADULT - PROBLEM SELECTOR PLAN 1
#HF exacerbation  Admitted with proBNP >70K and prominent cardiomegaly on CXR and CTAP. Possibly in setting of medication discontinuation/nonadherence as pt previously on diuretics and GDMT including furosemide, metolazone, empagliflozin, entresto; but now per daugther is only on Toprol     - cardiology consulted in ED, f/u recs #HF exacerbation  Admitted with proBNP >70K and prominent cardiomegaly on CXR and CTAP. Patient with chronic HFrEF (baseline is EF of 15% per daughter), unclear etiology, possible CAD. Follows with an [facility] Cardiologist, Dr. Murray Dillard. Pt has significant JVD on admisison but otherwise is warm, no LE edema, no crackles. POCUS on admission showed severely reduced EF. Home med is Only on toprol 50 XL. Not on any other GDMT (was on Entresto, however did not tolerate it per daughter, likely from renal failure?) Was also previously on metolazone and lasix per surescripts.     - s/p Lasix 40 IVP in ED  - strict I's & O's  - daily weights  - obtain TTE  - obtain collateral/records from [facility] (pt had two recent hospitalizations in [facility] mid-late May)  - appreciate Cardiology recommendations  - holding home Toprol (avoiding negative inotropes at this time) #HF exacerbation  Admitted with proBNP >70K and prominent cardiomegaly on CXR and CTAP. Patient with chronic HFrEF (baseline is EF of 15% per daughter), unclear etiology, possible CAD. Follows with an [facility] Cardiologist, Dr. Murray Dillard. Pt has significant JVD on admisison but otherwise is warm, no LE edema, no crackles. POCUS on admission showed severely reduced EF. Home med is Only on toprol 50 XL. Not on any other GDMT (was on Entresto, however did not tolerate it per daughter, likely from renal failure?) Was also previously on metolazone and lasix but stopped by cardiologist.     - s/p Lasix 40 IVP in ED  - strict I's & O's  - daily weights  - obtain TTE  - obtain collateral/records from [facility] (pt had two recent hospitalizations in [facility] mid-late May)  - appreciate Cardiology recommendations  - holding home Toprol (avoiding negative inotropes at this time)

## 2024-06-05 NOTE — PROGRESS NOTE ADULT - SUBJECTIVE AND OBJECTIVE BOX
OVERNIGHT EVENTS:    SUBJECTIVE / INTERVAL HPI: Patient seen and examined at bedside.     VITAL SIGNS:  Vital Signs Last 24 Hrs  T(C): 36.4 (05 Jun 2024 10:00), Max: 37.9 (04 Jun 2024 22:02)  T(F): 97.6 (05 Jun 2024 10:00), Max: 100.3 (04 Jun 2024 22:02)  HR: 76 (05 Jun 2024 10:49) (76 - 97)  BP: 95/50 (05 Jun 2024 10:49) (92/54 - 111/66)  BP(mean): 67 (05 Jun 2024 10:49) (67 - 82)  RR: 16 (05 Jun 2024 10:49) (16 - 18)  SpO2: 100% (05 Jun 2024 10:49) (97% - 100%)    Parameters below as of 05 Jun 2024 10:49  Patient On (Oxygen Delivery Method): room air        PHYSICAL EXAM:    General: alert, in no acute distress  HEENT: NC/AT; PERRL, anicteric sclera; MMM  Neck: supple  Cardiovascular: +S1/S2, RRR  Respiratory: CTA B/L; no W/R/R  Gastrointestinal: soft, NT/ND; +BSx4  Extremities: WWP; no edema, clubbing or cyanosis  Vascular: 2+ radial, DP/PT pulses B/L  Neurological: AAOx3; no focal deficits    MEDICATIONS:  MEDICATIONS  (STANDING):  aspirin  chewable 81 milliGRAM(s) Oral daily  atorvastatin 40 milliGRAM(s) Oral at bedtime  ertapenem  IVPB 500 milliGRAM(s) IV Intermittent once  ertapenem  IVPB 500 milliGRAM(s) IV Intermittent every 24 hours  furosemide   Injectable 40 milliGRAM(s) IV Push every 12 hours  heparin   Injectable 5000 Unit(s) SubCutaneous every 8 hours  lidocaine   4% Patch 1 Patch Transdermal every 24 hours  pantoprazole    Tablet 40 milliGRAM(s) Oral every 12 hours  sodium zirconium cyclosilicate 10 Gram(s) Oral every 8 hours  thiamine 100 milliGRAM(s) Oral every 24 hours    MEDICATIONS  (PRN):  acetaminophen     Tablet .. 975 milliGRAM(s) Oral every 6 hours PRN Temp greater or equal to 38C (100.4F), Mild Pain (1 - 3)  HYDROmorphone  Injectable 0.2 milliGRAM(s) IV Push every 4 hours PRN Moderate Pain (4 - 6)  HYDROmorphone  Injectable 0.5 milliGRAM(s) IV Push every 4 hours PRN Severe Pain (7 - 10)      ALLERGIES:  Allergies    Aldactone (Rash)    Intolerances        LABS:                        7.5    7.31  )-----------( 231      ( 05 Jun 2024 04:57 )             22.9     06-05    123<L>  |  93<L>  |  82<H>  ----------------------------<  141<H>  5.7<H>   |  17<L>  |  2.63<H>    Ca    8.9      05 Jun 2024 04:57  Phos  4.3     06-05  Mg     2.1     06-05    TPro  6.3  /  Alb  3.5  /  TBili  0.5  /  DBili  x   /  AST  27  /  ALT  27  /  AlkPhos  88  06-05    PT/INR - ( 05 Jun 2024 04:57 )   PT: 13.3 sec;   INR: 1.17          PTT - ( 05 Jun 2024 04:57 )  PTT:33.6 sec  Urinalysis Basic - ( 05 Jun 2024 04:57 )    Color: x / Appearance: x / SG: x / pH: x  Gluc: 141 mg/dL / Ketone: x  / Bili: x / Urobili: x   Blood: x / Protein: x / Nitrite: x   Leuk Esterase: x / RBC: x / WBC x   Sq Epi: x / Non Sq Epi: x / Bacteria: x      CAPILLARY BLOOD GLUCOSE      POCT Blood Glucose.: 134 mg/dL (05 Jun 2024 04:26)      RADIOLOGY & ADDITIONAL TESTS: Reviewed.  SUBJECTIVE / INTERVAL HPI: Patient seen and examined at bedside.     VITAL SIGNS:  Vital Signs Last 24 Hrs  T(C): 36.4 (05 Jun 2024 10:00), Max: 37.9 (04 Jun 2024 22:02)  T(F): 97.6 (05 Jun 2024 10:00), Max: 100.3 (04 Jun 2024 22:02)  HR: 76 (05 Jun 2024 10:49) (76 - 97)  BP: 95/50 (05 Jun 2024 10:49) (92/54 - 111/66)  BP(mean): 67 (05 Jun 2024 10:49) (67 - 82)  RR: 16 (05 Jun 2024 10:49) (16 - 18)  SpO2: 100% (05 Jun 2024 10:49) (97% - 100%)    Parameters below as of 05 Jun 2024 10:49  Patient On (Oxygen Delivery Method): room air        PHYSICAL EXAM:    Constitutional: appears in distress, eyes squeezed shut, laying in bed  Neurological: AAOx1, CN grossly intact, , reduced right UE and LE strength  HEENT: PERRL, EOMI, sclera non-icteric, neck supple, +JVD, MMM,  Respiratory: CTA b/l, good air entry b/l, no wheezing, no crackles/rales, without accessory muscle use and no intercostal retractions  Cardiovascular: irregular rhythm, normal S1S2, no murmurs or rubs   Gastrointestinal: soft, tender to deep palpation, non distended, neg hepatojugular reflex, no masses or overt organomegaly palpable, BS normal  MSK: Tenderness over the lumbar spine  Extremities: cool LE, dorsalis pedis pulses not palpable, feet without ulcers however hyperkeratosis present, no LE edema, muscle atrophy of distal right LE, LUE edematous     MEDICATIONS:  MEDICATIONS  (STANDING):  aspirin  chewable 81 milliGRAM(s) Oral daily  atorvastatin 40 milliGRAM(s) Oral at bedtime  ertapenem  IVPB 500 milliGRAM(s) IV Intermittent once  ertapenem  IVPB 500 milliGRAM(s) IV Intermittent every 24 hours  furosemide   Injectable 40 milliGRAM(s) IV Push every 12 hours  heparin   Injectable 5000 Unit(s) SubCutaneous every 8 hours  lidocaine   4% Patch 1 Patch Transdermal every 24 hours  pantoprazole    Tablet 40 milliGRAM(s) Oral every 12 hours  sodium zirconium cyclosilicate 10 Gram(s) Oral every 8 hours  thiamine 100 milliGRAM(s) Oral every 24 hours    MEDICATIONS  (PRN):  acetaminophen     Tablet .. 975 milliGRAM(s) Oral every 6 hours PRN Temp greater or equal to 38C (100.4F), Mild Pain (1 - 3)  HYDROmorphone  Injectable 0.2 milliGRAM(s) IV Push every 4 hours PRN Moderate Pain (4 - 6)  HYDROmorphone  Injectable 0.5 milliGRAM(s) IV Push every 4 hours PRN Severe Pain (7 - 10)      ALLERGIES:  Allergies    Aldactone (Rash)    Intolerances        LABS:                        7.5    7.31  )-----------( 231      ( 05 Jun 2024 04:57 )             22.9     06-05    123<L>  |  93<L>  |  82<H>  ----------------------------<  141<H>  5.7<H>   |  17<L>  |  2.63<H>    Ca    8.9      05 Jun 2024 04:57  Phos  4.3     06-05  Mg     2.1     06-05    TPro  6.3  /  Alb  3.5  /  TBili  0.5  /  DBili  x   /  AST  27  /  ALT  27  /  AlkPhos  88  06-05    PT/INR - ( 05 Jun 2024 04:57 )   PT: 13.3 sec;   INR: 1.17          PTT - ( 05 Jun 2024 04:57 )  PTT:33.6 sec  Urinalysis Basic - ( 05 Jun 2024 04:57 )    Color: x / Appearance: x / SG: x / pH: x  Gluc: 141 mg/dL / Ketone: x  / Bili: x / Urobili: x   Blood: x / Protein: x / Nitrite: x   Leuk Esterase: x / RBC: x / WBC x   Sq Epi: x / Non Sq Epi: x / Bacteria: x      CAPILLARY BLOOD GLUCOSE      POCT Blood Glucose.: 134 mg/dL (05 Jun 2024 04:26)      RADIOLOGY & ADDITIONAL TESTS: Reviewed.

## 2024-06-05 NOTE — DIETITIAN INITIAL EVALUATION ADULT - ADD RECOMMEND
-Continue current diet   -Consider Nepro BID as renal function improves  -Encourage good PO intake  -Honor food preferences as able  -Weekly wts  -Monitor chemistry, GI function, and skin integrity

## 2024-06-05 NOTE — PROGRESS NOTE ADULT - PROBLEM SELECTOR PLAN 8
Daughter states this is chronic. Patient is not on AC for unclear reasons. CHADSVASC of 6. She had rectal bleeding at outside hospital several weeks ago and scope was deferred due to her cardiac condition. Rectal bleeding has stopped per daughter and hgb is stable since discharge and her fecal occult was negative.   - consider initiating full AC, will defer to cardiology  - had been on Eliquis in the past but was stopped approx Dec '23 given on and off rectal bleed.    - Hold off on Toprol given concern for low flow state and marginal pressures and patient is currently rate controlled

## 2024-06-05 NOTE — ED ADULT NURSE NOTE - OBJECTIVE STATEMENT
Patient is a 89yoF bibems for nausea/vomiting and worsening back pain. Patient with hx of htn, chf, ckd, and chronic back pain with cord stimulator. On assessment, patient is awake and calm, axox1 (to self). Patient's daughter who is at the bedside reports that patient has been having nausea with two episode of vomiting x today. Daughter also states that patient has not been eating as much, patient endorsing some suprapubic pain. Patient arrived with mackenzie in place, daughter states that patient had it placed at [facility] on 05/24. PIV in place, on full CM for monitoring.

## 2024-06-05 NOTE — CONSULT NOTE ADULT - ASSESSMENT
NEURO:  #Metabolic Encephalopathy    CARDIOVASCULAR:  #HFrEF    #Afib     PULM:  #High risk for pulmonary edema     GI:  #Diarrhea  #Nausea     RENAL:  #Acute on chronic renal failure  #History of obstructive Uropathy  #Hyperkalemia     ENDO:       MSK:  #Back pain   DDX: Compression fracture vs Muscle spasm vs Malignancy vs Kidney stone   - FU CT read  - Lidocaine patch  - Tylenol  - Avoid NSAIDS  - 0.4 IV dilaudid for severe pain     ID:   #    HEME/ONC:  #Anemia    Diet: Renal Diet  DVT ppx: Subq Heparin    Dispo: 7L    Case discussed with Dr. Briseno    89-year-old female history of chronic lower back pain c/b left foot drop with spinal cord stimulator, hypertension, Afib (not on AC), HFrEF (baseline ef is 15%) CKD (baseline cr 1.3-1.5), chronic anemia (baseline hgb 8), obstructive uropathy with recently placed mackenzie 2 weeks ago, several recent [facility] hospitalizations in May for hyponatremia and urinary retention who presents today with worsening backpain, lethargy, confusion, nausea, and diarrhea, found to have worsening uremia and renal failure and hyperkalemia for which ICU was consulted    NEURO:  #Metabolic Encephalopathy  Baseline is AOx4, however daughter states that she has been in decline for the past month, currently AOx1-2, able to express her needs and answers questions appropriately  Likely from renal failure, uremia and hyperkalemia and HFrEF, also with possible underlying infection  - Send B12 and TSH    CARDIOVASCULAR:  #HFrEF  Patient with chronic HFrEF (baseline is EF of 15% per daughter), unclear etiology, possible CAD. Follows with an [facility] Cardiologist, Dr. Murray Dillard. Patient on exam is warm, has worse than baseline mental status, she is making some urine, she is not peripherally edematous and has no crackles on ausculation, however she does have significant JVD, BNP is greater than 70k and her POCUS shows severely reduced EF. She is saturating well on RA. Not on standing home diuretic. Only on toprol 50 XL. Not on any other GDMT (was on entresto, however did not tolerate it per daughter, likely from renal failure?) Patient has evidence of poor renal perfusion although other markers such as lactate and LFTs are WNL.   - s/p IV Lasix 40 in the ED  - I/Os, daily weights  - TTE  - Obtain records and collateral from [facility]  - Appreciate Cardiology recommendations   - Hold off on negative inotropes at this time     #Afib   Daughter states this is chronic. Patient is not on AC for unclear reasons. CHADSVASC of 6. She had rectal bleeding at outside hospital several weeks ago and scope was deferred due to her cardiac condition. Rectal bleeding has stopped per daughter and hgb is stable since discharge and her fecal occult was negative.   - Will consider initiating full AC, will defer to cardiology recommendations, no contraindications to starting at this time   - Hold off on Toprol given concern for low flow state and marginal pressures and patient is currently rate controlled     #Elevated troponin   #Possible CAD   Patient without active chest pain or shortness of breath. Troponin moderately high which could be from demand from newly reduced EF. Patient with Afib on ekg and multiple TWIs, ekg not concerning for STEMI. Daughter is unsure if patient has underlying CAD, however she is on ASA and statin.   - Follow up Cardiology recommendations  - Repeat full set of cardiac enzymes and EKG  - If continuing to uptrend may consider treating for NSTEMI  - Ok to continue ASA and Statin     PULM:  #High risk for pulmonary edema   No evidence of B lines on POCUS, however severely reduced EF and plethoric IVC  - Hold off on IVF for her TODD  - s/p IV Lasix  - Currently saturating well on RA, goal SpO2>92     GI:  #Diarrhea  #Nausea   Likely from uremia, cannot r/o infectious etiology which may explain her abdominal pain and low grade rectal temp  - PRN zofran  - If has another diarrheal episode can check gipcr     RENAL:  #Acute on chronic renal failure  #History of obstructive Uropathy  #Hyperkalemia   Suspect worsening renal failure is a combination of low flow cardiac state and GI losses and poor PO intake   - Would give additional insulin 5 and D50 dose now  - Start lokelma 10 TID with hold parameters  - Repeat BMP in 2 hours  - Urine studies   - FU CT read   - Continue with mackenzie catheter   - I/Os  - Renal consult, K is improving with medical managemt and she is making some urine, no urgent needs for HD    ENDO:   GABINO    MSK:  #Back pain   DDX: Compression fracture vs Muscle spasm vs Malignancy vs Kidney stone   - FU CT read  - Lidocaine patch  - Tylenol  - Avoid NSAIDS  - 0.4 IV dilaudid for severe pain     ID:   #Low grade fever  Not meeting SIRS criteria in the ED, mackenzie exchanged in ED  - Unclear why UA is incomplete, would resend   - FU Urine and blood cultures   - Monitor for further fevers  - s/p dose of cefepime in ED, monitor off antibiotics unless decompensates     HEME/ONC:  #Anemia  Hgb stable since discharge on 5/28. No evidence of bleeding although daughter states she has had some rectal bleeding recently on/off and was supposed to get a colonoscopy but deferred due to cardiac issues. Also with weight loss.   - Iron studies, B12, folate, retic, LDH, Hapto   - Active Type and screen  - Transfuse for Hgb <8 given her CAD and elevated troponin on presentation   - FU CT A/P     Diet: Renal Diet  DVT ppx: Subq Heparin    Dispo: 7L    Case discussed with Dr. Briseno    89-year-old female history of chronic lower back pain c/b left foot drop with spinal cord stimulator, hypertension, Afib (not on AC), HFrEF (baseline ef is 15%) CKD (baseline cr 1.3-1.5), chronic anemia (baseline hgb 8), obstructive uropathy with recently placed mackenzie 2 weeks ago, several recent [facility] hospitalizations in May for hyponatremia and urinary retention who presents today with worsening backpain, lethargy, confusion, nausea, and diarrhea, found to have worsening uremia and renal failure and hyperkalemia for which ICU was consulted    NEURO:  #Metabolic Encephalopathy  Baseline is AOx4, however daughter states that she has been in decline for the past month, currently AOx1-2, able to express her needs and answers questions appropriately  Likely from renal failure, uremia and hyperkalemia and HFrEF, also with possible underlying infection  - Send B12 and TSH    CARDIOVASCULAR:  #HFrEF  Patient with chronic HFrEF (baseline is EF of 15% per daughter), unclear etiology, possible CAD. Follows with an [facility] Cardiologist, Dr. Murray Dillard. Patient on exam is warm, has worse than baseline mental status, she is making some urine, she is not peripherally edematous and has no crackles on ausculation, however she does have significant JVD, BNP is greater than 70k and her POCUS shows severely reduced EF. She is saturating well on RA. Not on standing home diuretic. Only on toprol 50 XL. Not on any other GDMT (was on entresto, however did not tolerate it per daughter, likely from renal failure?) Patient has evidence of poor renal perfusion although other markers such as lactate and LFTs are WNL.   - s/p IV Lasix 40 in the ED  - I/Os, daily weights  - TTE  - Obtain records and collateral from [facility]  - Appreciate Cardiology recommendations   - Hold off on negative inotropes at this time     #Afib   Daughter states this is chronic. Patient is not on AC for unclear reasons. CHADSVASC of 6. She had rectal bleeding at outside hospital several weeks ago and scope was deferred due to her cardiac condition. Rectal bleeding has stopped per daughter and hgb is stable since discharge and her fecal occult was negative.   - Will consider initiating full AC, will defer to cardiology recommendations, no contraindications to starting at this time   - Hold off on Toprol given concern for low flow state and marginal pressures and patient is currently rate controlled     #Elevated troponin   #Possible CAD   Patient without active chest pain or shortness of breath. Troponin moderately high which could be from demand from newly reduced EF. Patient with Afib on ekg and multiple TWIs, ekg not concerning for STEMI. Daughter is unsure if patient has underlying CAD, however she is on ASA and statin.   - Follow up Cardiology recommendations  - Repeat full set of cardiac enzymes and EKG  - If continuing to uptrend may consider treating for NSTEMI  - Ok to continue ASA and Statin     PULM:  #High risk for pulmonary edema   No evidence of B lines on POCUS, however severely reduced EF and plethoric IVC  - Hold off on IVF for her TODD  - s/p IV Lasix  - Currently saturating well on RA, goal SpO2>92     GI:  #Diarrhea  #Nausea   Likely from uremia, cannot r/o infectious etiology which may explain her abdominal pain and low grade rectal temp  - PRN zofran  - If has another diarrheal episode can check gipcr     RENAL:  #Acute on chronic renal failure  #History of obstructive Uropathy  #Hyperkalemia   Suspect worsening renal failure is a combination of low flow cardiac state and GI losses and poor PO intake   - Would give additional insulin 5 and D50 dose now  - Start lokelma 10 TID with hold parameters  - Repeat BMP in 2 hours  - Urine studies   - FU CT read   - Continue with mackenzie catheter   - I/Os  - Renal consult, K is improving with medical managemt and she is making some urine, no urgent needs for HD    ENDO:   GABINO    MSK:  #Back pain   DDX: Compression fracture vs Muscle spasm vs Malignancy vs Kidney stone   - FU CT read  - Lidocaine patch  - Tylenol  - Avoid NSAIDS  - 0.4 IV dilaudid for severe pain     ID:   #Low grade fever  Not meeting SIRS criteria in the ED, mackenzie exchanged in ED  - Unclear why UA is incomplete, would resend   - FU Urine and blood cultures   - Monitor for further fevers  - s/p dose of cefepime in ED, monitor off antibiotics unless decompensates   - Add on procal     HEME/ONC:  #Anemia  Hgb stable since discharge on 5/28. No evidence of bleeding although daughter states she has had some rectal bleeding recently on/off and was supposed to get a colonoscopy but deferred due to cardiac issues. Also with weight loss.   - Iron studies, B12, folate, retic, LDH, Hapto   - Active Type and screen  - Transfuse for Hgb <8 given her CAD and elevated troponin on presentation   - FU CT A/P     Diet: Renal Diet  DVT ppx: Subq Heparin    Dispo: 7L    Case discussed with Dr. Briseno

## 2024-06-05 NOTE — DIETITIAN INITIAL EVALUATION ADULT - PROBLEM SELECTOR PLAN 7
Not meeting SIRS criteria in the ED, Had mackenzie placed 2 wks ago at [facility] for obstructive uropathy, pt was discharged to [facility] with mackenzie, which was exchanged in ED. Initial UA sent had mod LE and no nitrites however sample was too small to do cell count  - repeat UA w/ Cx  - f/u blood cultures   - Monitor for further fevers  - s/p dose of cefepime in ED, monitor off antibiotics unless decompensates

## 2024-06-05 NOTE — H&P ADULT - HISTORY OF PRESENT ILLNESS
Patient last admitted,      In the ED,  VS: T 99.2, HR 84, /74, RR 16, SpO2 97% on RA  Labs: WBC 9.23, Hgb 8.3 MCV 86.6, Plt 281, Na 127 --> 124, K 6.9 --> 6.2, Cl 95, AG 13 --> 8, BUN 81, Cr 2.68, Trop T 133, proBNP >70,000  VBG: pH 7.32, pCO2 41, pO2 34, HCO3 21, Venous O2 sat 53%  Urine: dark yellow turbid urine, trace ketones, >1000 proteins, mod leuk est, large blood  RVP: negative  EKG: afib w/ HR 96, QTc 490  CXR: cardiomegaly,   CTAP: cardiomegaly. BL effusions (R>L),   Orders: Cefepime 2000mg, Lasix 40mg IVP, Albuterol nebulizer 10mg, Calcium gluconate 1g IV, Humulin 5, 1 amp D50, Sodium bicarb drip 250cc/hr for 2 hrs, Zofran 4 IVP, Toradol 15           Patient last admitted,      In the ED,  VS: T 99.2, HR 84, /74, RR 16, SpO2 97% on RA  Labs: WBC 9.23, Hgb 8.3 MCV 86.6, Plt 281, Na 127 --> 124, K 6.9 --> 6.2, Cl 95, AG 13 --> 8, BUN 81, Cr 2.68, Trop T 133, proBNP >70,000  VBG: pH 7.32, pCO2 41, pO2 34, HCO3 21, Venous O2 sat 53%  Urine: dark yellow turbid urine, trace ketones, >1000 proteins, mod leuk est, large blood  RVP: negative  EKG: afib w/ ventricular rate 96, QTc 490  CXR: cardiomegaly,   CTAP: cardiomegaly. BL effusions (R>L),   Orders: Cefepime 2000mg, Lasix 40mg IVP, Albuterol nebulizer 10mg, Calcium gluconate 1g IV, Humulin 5, 1 amp D50, Sodium bicarb drip 250cc/hr for 2 hrs, Zofran 4 IVP, Toradol 15   89-year-old female history of chronic lower back pain c/b left foot drop with spinal cord stimulator, hypertension, Afib (not on AC), HFrEF (baseline ef is 15%) CKD (baseline cr 1.3-1.5), chronic anemia (baseline hgb 8), obstructive uropathy with recently placed mackenzie 2 weeks ago, several recent [facility] hospitalizations in May for hyponatremia and urinary retention who presents today with worsening backpain, lethargy, confusion, nausea, and diarrhea, found to have worsening uremia and renal failure and hyperkalemia for which ICU was consulted.       with recent admission to [facility] for hyponatremia renal failure and hyperkalemia discharged with Mackenzie catheter last changed 2 weeks ago here today with body aches  acute on chronic lower back pain 2 episodes of emesis as well as loose stools and decreased ability to ambulate/generalized weakness.  Patient denies chest pain or shortness of breath but patient's daughter at the bedside states that patient has been breathing more heavily over the past 2 days.  No fever chills cough.  Does not have pain in the Mackenzie catheter site but is complaining of lower abdominal discomfort as well.  No hematochezia or melena   But has had prior GI bleeding.  Is on aspirin daily as well as metoprolol.  Patient wishes to be DNR but not DNI is open to medications that would prevent her heart from stopping but does not want to be shocked with electricity or to have CPR performed, is open to having a trial intubation if needed.        In the ED:  VS: T 99.2, HR 84, /74, RR 16, SpO2 97% on RA  Labs: WBC 9.23, Hgb 8.3 MCV 86.6, Plt 281, Na 127 --> 124, K 6.9 --> 6.2, Cl 95, AG 13 --> 8, BUN 81, Cr 2.68, Trop T 133, proBNP >70,000  VBG: pH 7.32, pCO2 41, pO2 34, HCO3 21, Venous O2 sat 53%  Urine: dark yellow turbid urine, trace ketones, >1000 proteins, mod leuk est, large blood  RVP: negative  EKG: afib w/ ventricular rate 96, QTc 490  CXR: cardiomegaly,   CTAP: cardiomegaly. BL effusions (R>L),   Orders: Cefepime 2000mg, Lasix 40mg IVP, Albuterol nebulizer 10mg, Calcium gluconate 1g IV, Humulin 5, 1 amp D50, Sodium bicarb drip 250cc/hr for 2 hrs, Zofran 4 IVP, Toradol 15   89-year-old female history of chronic lower back pain c/b left foot drop with spinal cord stimulator, hypertension, Afib (not on AC), HFrEF (baseline EF is 15%) CKD (baseline cr 1.3-1.5), chronic anemia (baseline hgb 8), obstructive uropathy with recently placed mackenzie 2 weeks ago, several recent [facility] hospitalizations in May for hyponatremia and urinary retention who presents today with worsening backpain, lethargy, confusion, nausea, and diarrhea, found to have worsening uremia and renal failure and hyperkalemia for which ICU was consulted.       with recent admission to [facility] for hyponatremia renal failure and hyperkalemia discharged with Mackenzie catheter last changed 2 weeks ago here today with body aches  acute on chronic lower back pain 2 episodes of emesis as well as loose stools and decreased ability to ambulate/generalized weakness.  Patient denies chest pain or shortness of breath but patient's daughter at the bedside states that patient has been breathing more heavily over the past 2 days.  No fever chills cough.  Does not have pain in the Mackenzie catheter site but is complaining of lower abdominal discomfort as well.  No hematochezia or melena   But has had prior GI bleeding.  Is on aspirin daily as well as metoprolol.  Patient wishes to be DNR but not DNI is open to medications that would prevent her heart from stopping but does not want to be shocked with electricity or to have CPR performed, is open to having a trial intubation if needed.        In the ED:  VS: T 99.2, HR 84, /74, RR 16, SpO2 97% on RA  Labs: WBC 9.23, Hgb 8.3 MCV 86.6, Plt 281, Na 127 --> 124, K 6.9 --> 6.2, Cl 95, AG 13 --> 8, BUN 81, Cr 2.68, Trop T 133, proBNP >70,000  VBG: pH 7.32, pCO2 41, pO2 34, HCO3 21, Venous O2 sat 53%  Urine: dark yellow turbid urine, trace ketones, >1000 proteins, mod leuk est, large blood  RVP: negative  EKG: afib w/ ventricular rate 96, QTc 490  CXR: cardiomegaly,   CTAP: cardiomegaly. BL effusions (R>L),   Orders: Cefepime 2000mg, Lasix 40mg IVP, Albuterol nebulizer 10mg, Calcium gluconate 1g IV, Humulin 5, 1 amp D50, Sodium bicarb drip 250cc/hr for 2 hrs, Zofran 4 IVP, Toradol 15   89-year-old female history of chronic lower back pain c/b left foot drop with spinal cord stimulator, hypertension, Afib (not on AC), HFrEF (baseline EF is 15%) CKD (baseline cr 1.3-1.5), chronic anemia (baseline hgb 8), obstructive uropathy with recently placed mackenzie 2 weeks ago at [facility] and several recent hospitalizations at [facility] in May for hyponatremia and urinary retention who presents today with worsening back pain, lethargy, confusion, nausea, and diarrhea, found to have uremia and acute on chronic renal failure and hyperkalemia.     Patient at baseline is reportedly AOx4 and was ambulating with a walker until the beginning of May per Daughter. Daughter reports pt has been declining since then. She had two hospitalizations at [facility] in May. The 1st was from May 18-23 for hyponatremia. She was discharged to [facility], however sent back from [facility] to [facility] after only two days for inability to void (urinary retention). Patient had Mackenzie placed for obstructive uropathy at 2nd [facility] hospitalization and was treated for UTI. She was discharged on 5/28 with Cr of 1.5, K of 5.2, Na of 132 and Hgb of 8. Since then, per daughter, pt has been utterly bedbound. Daughter reports today 6/4 pt became acutely more confused and for the past two days she has had more nausea, vomiting, and her chronic back pain seems to be more severe than usual. In the ED she was treated with cefepime for possible UTI and low grade fever (100.3 rectal) and hyperkalemia cocktail for K of 6.7.     In the ED:  VS: T 99.2, HR 84, /74, RR 16, SpO2 97% on RA  Labs: WBC 9.23, Hgb 8.3 MCV 86.6, Plt 281, Na 127 --> 124, K 6.9 --> 6.2, Cl 95, AG 13 --> 8, BUN 81, Cr 2.68, Trop T 133, proBNP >70,000  VBG: pH 7.32, pCO2 41, pO2 34, HCO3 21, Venous O2 sat 53%  Urine: dark yellow turbid urine, trace ketones, >1000 proteins, mod leuk est, large blood  RVP: negative  EKG: afib w/ ventricular rate 96, QTc 490  Orders: Cefepime 2000mg, Lasix 40mg IVP, Albuterol nebulizer 10mg, Calcium gluconate 1g IV, Humulin 5, 1 amp D50, Sodium bicarb drip 250cc/hr for 2 hrs, Zofran 4 IVP, Toradol 15   89-year-old female history of chronic lower back pain c/b left foot drop with spinal cord stimulator, hypertension, Afib (not on AC), HFrEF (baseline EF is 15%) CKD (baseline cr 1.3-1.5), chronic anemia (baseline hgb 8), obstructive uropathy with recently placed mackenzie 2 weeks ago at [facility] and several recent hospitalizations at [facility] in May for hyponatremia and urinary retention who presents today with worsening back pain, lethargy, confusion, nausea, and diarrhea, found to have uremia and acute on chronic renal failure and hyperkalemia.     Patient at baseline is reportedly AOx4 and was ambulating with a walker until the beginning of May per Daughter. Daughter reports pt has been declining since then. She had two hospitalizations at [facility] in May. The 1st was from May 18-23 for hyponatremia. She was discharged to [facility], however sent back from [facility] to [facility] after only two days for inability to void (urinary retention) as well as some dysuria symptoms. Patient had Mackenzie placed for obstructive uropathy at 2nd [facility] hospitalization and was treated for UTI. She was discharged on 5/28 with Cr of 1.5, K of 5.2, Na of 132 and Hgb of 8. Since then, per daughter, pt has been utterly bedbound. Daughter reports today 6/4 pt became acutely more confused and for the past two days she has had more nausea, vomiting, and her chronic back pain seems to be more severe than usual. In the ED she was treated with cefepime for possible UTI and low grade fever (100.3 rectal) and hyperkalemia cocktail for K of 6.7.     At bedside patient with her eyes squeezed shut, complaining of headache. Does open her eyes when asked. Denies shortness of breath     In the ED:  VS: T 99.2, HR 84, /74, RR 16, SpO2 97% on RA  Labs: WBC 9.23, Hgb 8.3 MCV 86.6, Plt 281, Na 127 --> 124, K 6.9 --> 6.2, Cl 95, AG 13 --> 8, BUN 81, Cr 2.68, Trop T 133, proBNP >70,000  VBG: pH 7.32, pCO2 41, pO2 34, HCO3 21, Venous O2 sat 53%  Urine: dark yellow turbid urine, trace ketones, >1000 proteins, mod leuk est, large blood  RVP: negative  EKG: afib w/ ventricular rate 96, QTc 490  Orders: Cefepime 2000mg, Lasix 40mg IVP, Albuterol nebulizer 10mg, Calcium gluconate 1g IV, Humulin 5, 1 amp D50, Sodium bicarb drip 250cc/hr for 2 hrs, Zofran 4 IVP, Toradol 15   89-year-old female history of chronic lower back pain c/b left foot drop with spinal cord stimulator, hypertension, Afib (not on AC), HFrEF (baseline EF is 15%) CKD (baseline cr 1.3-1.5), chronic anemia (baseline hgb 8), obstructive uropathy with recently placed mackenzie 2 weeks ago at [facility] and several recent hospitalizations at [facility] in May for hyponatremia and urinary retention who presents today with worsening back pain, lethargy, confusion, nausea, and diarrhea, found to have uremia and acute on chronic renal failure and hyperkalemia.     Patient at baseline is reportedly AOx4 and was ambulating with a walker until the beginning of May per Daughter. Daughter reports pt has been declining since then. She had two hospitalizations at [facility] in May. The 1st was from May 18-23 for hyponatremia. She was discharged to [facility], however sent back from [facility] to [facility] after only two days for inability to void (urinary retention) as well as some dysuria symptoms. Patient had Mackenzie placed for obstructive uropathy at 2nd [facility] hospitalization and was treated for UTI. She was discharged on 5/28 with Cr of 1.5, K of 5.2, Na of 132 and Hgb of 8. Since then, per daughter, pt has been utterly bedbound. Daughter reports today 6/4 pt became acutely more confused and for the past two days she has had more nausea, vomiting, and her chronic back pain seems to be more severe than usual. In the ED she was treated with cefepime for possible UTI and low grade fever (100.3 rectal) and hyperkalemia cocktail for K of 6.7.     At bedside patient with her eyes squeezed shut, complaining of headache. Does open her eyes when asked. When asked about chest pain, shortness of breath, dizziness, patient continues to say she has a headache. When pressed further, patient alternately says yes and then no to questions. Per daughter, patient diet akin to tea and toast at home.    In the ED:  VS: T 99.2, HR 84, /74, RR 16, SpO2 97% on RA  Labs: WBC 9.23, Hgb 8.3 MCV 86.6, Plt 281, Na 127 --> 124, K 6.9 --> 6.2, Cl 95, AG 13 --> 8, BUN 81, Cr 2.68, Trop T 133, proBNP >70,000  VBG: pH 7.32, pCO2 41, pO2 34, HCO3 21, Venous O2 sat 53%  Urine: dark yellow turbid urine, trace ketones, >1000 proteins, mod leuk est, large blood  RVP: negative  EKG: afib w/ ventricular rate 96, QTc 490  Orders: Cefepime 2000mg, Lasix 40mg IVP, Albuterol nebulizer 10mg, Calcium gluconate 1g IV, Humulin 5, 1 amp D50, Sodium bicarb drip 250cc/hr for 2 hrs, Zofran 4 IVP, Toradol 15   89-year-old female history of chronic lower back pain c/b left foot drop with spinal cord stimulator, hypertension, Afib (not on AC), HFrEF (baseline EF is 15%) CKD (baseline cr 1.3-1.5), chronic anemia (baseline hgb 8), obstructive uropathy with recently placed mackenzie 2 weeks ago at [facility] and several recent hospitalizations at [facility] in May for hyponatremia and urinary retention who presents today with worsening back pain, lethargy, confusion, nausea, and diarrhea, found to have uremia and acute on chronic renal failure and hyperkalemia.     Patient at baseline is reportedly AOx4 and was ambulating with a walker until the beginning of May per Daughter. Daughter reports pt has been declining since then. She had two hospitalizations at [facility] in May. The 1st was from May 18-23 for hyponatremia. She was discharged to [facility], however sent back from [facility] to [facility] after only two days for inability to void (urinary retention) as well as some dysuria symptoms. Patient had Mackenzie placed for obstructive uropathy at 2nd [facility] hospitalization and was treated for UTI. She was discharged on 5/28 with Cr of 1.5, K of 5.2, Na of 132 and Hgb of 8. Since then, per daughter, pt has been utterly bedbound. Daughter reports today 6/4 pt became acutely more confused and for the past two days she has had more nausea, vomiting, and her chronic back pain seems to be more severe than usual. In the ED she was treated with cefepime for possible UTI and low grade fever (100.3 rectal) and hyperkalemia cocktail for K of 6.7.     At bedside patient with her eyes squeezed shut, complaining of headache. Does open her eyes when asked. When asked about chest pain, shortness of breath, dizziness, patient continues to say she has a headache. When pressed further, patient alternately says yes and then no to questions. Per daughter, patient diet akin to tea and toast at home. Per daughter, patient had been on GDMT and lasix but was recently discontinued by [facility] cardiologist Dr. Murray Shook. Also had Eliquis discontinued by cardiologist because of intermittent BRBPR vs melena for about 1 yr. Last taken Dec '23.    In the ED:  VS: T 99.2, HR 84, /74, RR 16, SpO2 97% on RA  Labs: WBC 9.23, Hgb 8.3 MCV 86.6, Plt 281, Na 127 --> 124, K 6.9 --> 6.2, Cl 95, AG 13 --> 8, BUN 81, Cr 2.68, Trop T 133, proBNP >70,000  VBG: pH 7.32, pCO2 41, pO2 34, HCO3 21, Venous O2 sat 53%  Urine: dark yellow turbid urine, trace ketones, >1000 proteins, mod leuk est, large blood  RVP: negative  EKG: afib w/ ventricular rate 96, QTc 490  Orders: Cefepime 2000mg, Lasix 40mg IVP, Albuterol nebulizer 10mg, Calcium gluconate 1g IV, Humulin 5, 1 amp D50, Sodium bicarb drip 250cc/hr for 2 hrs, Zofran 4 IVP, Toradol 15   89-year-old female history of chronic lower back pain c/b left foot drop (2/2 herniated disc repair sx in 2010), now with spinal cord stimulator, hypertension, breast ca (2014, s/p R breast lumpectomy + radiation but no chemo), Afib (not on AC), HFrEF (baseline EF is 15%) CKD (baseline cr 1.3-1.5), chronic anemia (baseline hgb 8), obstructive uropathy with recently placed mackenzie 2 weeks ago at [facility] and several recent hospitalizations at [facility] in May for hyponatremia and urinary retention who presents today with worsening back pain, lethargy, confusion, nausea, and diarrhea, found to have uremia and acute on chronic renal failure and hyperkalemia.     Patient at baseline is reportedly AOx4 and was ambulating with a walker until the beginning of May per Daughter. Daughter reports pt has been declining since then. She had two hospitalizations at [facility] in May. The 1st was from May 18-23 for hyponatremia. She was discharged to [facility], however sent back from [facility] to [facility] after only two days for inability to void (urinary retention) as well as some dysuria symptoms. Patient had Mackenzie placed for obstructive uropathy at 2nd [facility] hospitalization and was treated for UTI. She was discharged on 5/28 with Cr of 1.5, K of 5.2, Na of 132 and Hgb of 8. Since then, per daughter, pt has been utterly bedbound. Daughter reports today 6/4 pt became acutely more confused and for the past two days she has had more nausea, vomiting, and her chronic back pain seems to be more severe than usual. In the ED she was treated with cefepime for possible UTI and low grade fever (100.3 rectal) and hyperkalemia cocktail for K of 6.7.     At bedside patient with her eyes squeezed shut, complaining of headache. Does open her eyes when asked. When asked about chest pain, shortness of breath, dizziness, patient continues to say she has a headache. When pressed further, patient alternately says yes and then no to questions. Per daughter, patient diet akin to tea and toast at home. Lives alone but has HHA 7 days per wk, 10hrs per day. Daughter also visits regularly. Per daughter, patient had been on GDMT and lasix but was recently discontinued by [facility] cardiologist Dr. Murray Shook. Also had Eliquis discontinued by cardiologist because of intermittent BRBPR vs melena for about 1 yr. Last taken Dec '23.    In the ED:  VS: T 99.2, HR 84, /74, RR 16, SpO2 97% on RA  Labs: WBC 9.23, Hgb 8.3 MCV 86.6, Plt 281, Na 127 --> 124, K 6.9 --> 6.2, Cl 95, AG 13 --> 8, BUN 81, Cr 2.68, Trop T 133, proBNP >70,000  VBG: pH 7.32, pCO2 41, pO2 34, HCO3 21, Venous O2 sat 53%  Urine: dark yellow turbid urine, trace ketones, >1000 proteins, mod leuk est, large blood  RVP: negative  EKG: afib w/ ventricular rate 96, QTc 490  Orders: Cefepime 2000mg, Lasix 40mg IVP, Albuterol nebulizer 10mg, Calcium gluconate 1g IV, Humulin 5, 1 amp D50, Sodium bicarb drip 250cc/hr for 2 hrs, Zofran 4 IVP, Toradol 15

## 2024-06-05 NOTE — H&P ADULT - NSHPOUTPATIENTPROVIDERS_GEN_ALL_CORE
fall precautions
PCP Dr Rajiv Mack ([facility]) [phone]  cards Dr Murray Shook ([facility]) [phone]  nephro Dr Jesus Chaparro ([facility]) [phone]

## 2024-06-05 NOTE — ED PROVIDER NOTE - PROGRESS NOTE DETAILS
patient found to be with acute on chronic renal failure was guaiaced with light brown stool hemoglobin 8.3 today baseline is 7.8 patient with elevated troponin unclear of baseline does have CKD more likely secondary to and on clearance and acute on chronic renal failure with demand given that the patient has no chest pain, ICU/telemetry consulted treated for hyperkalemia with calcium dextrose insulin as well as Lokelma and calcium, gentle  fluid bolus ordered Zepeda catheter replaced Admittedto tele Admitted to [facility].

## 2024-06-05 NOTE — PROGRESS NOTE ADULT - PROBLEM SELECTOR PLAN 5
Pt presenting with Na of 127 on admission. The patient has been previously admitted to [facility] last month for the management of hyponatremia as well. Given the history and finding, likely a combination of tea and toast, heart failure as well as SIADH form the severe back pain.    - CW diuresis with Lasix  - No more fluids for now given the patient is volume overloaded and HF   - Trend BMP  - FU renal recs

## 2024-06-05 NOTE — CONSULT NOTE ADULT - SUBJECTIVE AND OBJECTIVE BOX
Patient is a 89y old  Female who presents with a chief complaint of acute on chronic renal failure (05 Jun 2024 06:24)      HPI:  89-year-old female history of chronic lower back pain c/b left foot drop (2/2 herniated disc repair sx in 2010), now with spinal cord stimulator, hypertension, breast ca (2014, s/p R breast lumpectomy + radiation but no chemo), Afib (not on AC), HFrEF (baseline EF is 15%) CKD (baseline cr 1.3-1.5), chronic anemia (baseline hgb 8), obstructive uropathy with recently placed mackenzie 2 weeks ago at [facility] and several recent hospitalizations at [facility] in May for hyponatremia and urinary retention who presents today with worsening back pain, lethargy, confusion, nausea, and diarrhea, found to have uremia and acute on chronic renal failure and hyperkalemia.     Patient at baseline is reportedly AOx4 and was ambulating with a walker until the beginning of May per Daughter. Daughter reports pt has been declining since then. She had two hospitalizations at [facility] in May. The 1st was from May 18-23 for hyponatremia. She was discharged to [facility], however sent back from [facility] to [facility] after only two days for inability to void (urinary retention) as well as some dysuria symptoms. Patient had Mackenzie placed for obstructive uropathy at 2nd [facility] hospitalization and was treated for UTI. She was discharged on 5/28 with Cr of 1.5, K of 5.2, Na of 132 and Hgb of 8. Since then, per daughter, pt has been utterly bedbound. Daughter reports today 6/4 pt became acutely more confused and for the past two days she has had more nausea, vomiting, and her chronic back pain seems to be more severe than usual. In the ED she was treated with cefepime for possible UTI and low grade fever (100.3 rectal) and hyperkalemia cocktail for K of 6.7.     At bedside patient with her eyes squeezed shut, complaining of headache. Does open her eyes when asked. When asked about chest pain, shortness of breath, dizziness, patient continues to say she has a headache. When pressed further, patient alternately says yes and then no to questions. Per daughter, patient diet akin to tea and toast at home. Lives alone but has HHA 7 days per wk, 10hrs per day. Daughter also visits regularly. Per daughter, patient had been on GDMT and lasix but was recently discontinued by [facility] cardiologist Dr. Murray Shook. Also had Eliquis discontinued by cardiologist because of intermittent BRBPR vs melena for about 1 yr. Last taken Dec '23.    In the ED:  VS: T 99.2, HR 84, /74, RR 16, SpO2 97% on RA  Labs: WBC 9.23, Hgb 8.3 MCV 86.6, Plt 281, Na 127 --> 124, K 6.9 --> 6.2, Cl 95, AG 13 --> 8, BUN 81, Cr 2.68, Trop T 133, proBNP >70,000  VBG: pH 7.32, pCO2 41, pO2 34, HCO3 21, Venous O2 sat 53%  Urine: dark yellow turbid urine, trace ketones, >1000 proteins, mod leuk est, large blood  RVP: negative  EKG: afib w/ ventricular rate 96, QTc 490  Orders: Cefepime 2000mg, Lasix 40mg IVP, Albuterol nebulizer 10mg, Calcium gluconate 1g IV, Humulin 5, 1 amp D50, Sodium bicarb drip 250cc/hr for 2 hrs, Zofran 4 IVP, Toradol 15   (05 Jun 2024 02:09)    PAST MEDICAL & SURGICAL HISTORY:    MEDICATIONS  (STANDING):  aspirin  chewable 81 milliGRAM(s) Oral daily  atorvastatin 40 milliGRAM(s) Oral at bedtime  furosemide   Injectable 40 milliGRAM(s) IV Push every 12 hours  heparin   Injectable 5000 Unit(s) SubCutaneous every 8 hours  lidocaine   4% Patch 1 Patch Transdermal every 24 hours  pantoprazole    Tablet 40 milliGRAM(s) Oral every 12 hours  sodium zirconium cyclosilicate 10 Gram(s) Oral every 8 hours    MEDICATIONS  (PRN):  acetaminophen     Tablet .. 975 milliGRAM(s) Oral every 6 hours PRN Temp greater or equal to 38C (100.4F), Mild Pain (1 - 3)  HYDROmorphone  Injectable 0.2 milliGRAM(s) IV Push every 4 hours PRN Moderate Pain (4 - 6)  HYDROmorphone  Injectable 0.5 milliGRAM(s) IV Push every 4 hours PRN Severe Pain (7 - 10)          Home Living Status :  lives alone in an elevator accessible apartment building          -  Home Services :    10 hrs x  7 days/week         -  Family support : daughter     Baseline Functional Level Prior to Admission :             - ADL's/ IADL's :   requires partial assistance         - Ambulatory status prior to admission :   walked with a  walker         FAMILY HISTORY:      CBC Full  -  ( 05 Jun 2024 04:57 )  WBC Count : 7.31 K/uL  RBC Count : 2.64 M/uL  Hemoglobin : 7.5 g/dL  Hematocrit : 22.9 %  Platelet Count - Automated : 231 K/uL  Mean Cell Volume : 86.7 fl  Mean Cell Hemoglobin : 28.4 pg  Mean Cell Hemoglobin Concentration : 32.8 gm/dL  Auto Neutrophil # : 5.78 K/uL  Auto Lymphocyte # : 0.50 K/uL  Auto Monocyte # : 0.97 K/uL  Auto Eosinophil # : 0.00 K/uL  Auto Basophil # : 0.01 K/uL  Auto Neutrophil % : 79.1 %  Auto Lymphocyte % : 6.8 %  Auto Monocyte % : 13.3 %  Auto Eosinophil % : 0.0 %  Auto Basophil % : 0.1 %      06-05    123<L>  |  93<L>  |  82<H>  ----------------------------<  141<H>  5.7<H>   |  17<L>  |  2.63<H>    Ca    8.9      05 Jun 2024 04:57  Phos  4.3     06-05  Mg     2.1     06-05    TPro  6.3  /  Alb  3.5  /  TBili  0.5  /  DBili  x   /  AST  27  /  ALT  27  /  AlkPhos  88  06-05      Urinalysis Basic - ( 05 Jun 2024 04:57 )    Color: x / Appearance: x / SG: x / pH: x  Gluc: 141 mg/dL / Ketone: x  / Bili: x / Urobili: x   Blood: x / Protein: x / Nitrite: x   Leuk Esterase: x / RBC: x / WBC x   Sq Epi: x / Non Sq Epi: x / Bacteria: x        Radiology :     < from: Xray Chest 1 View- PORTABLE-Urgent (Xray Chest 1 View- PORTABLE-Urgent .) (06.05.24 @ 00:34) >  ACC: [id number] EXAM:  XR CHEST PORTABLE URGENT 1V   ORDERED BY: MINDI DAILEY     PROCEDURE DATE:  06/05/2024          INTERPRETATION:  Clinical history reason for exam: CHF.    Frontal chest.    No comparison.    Findings/  impression: Right basilar opacity/pleural effusion.. Cardiomegaly,   thoracic aortic calcification, left atrial enlargement. Hiatal hernia.   Thoracic spine and bilateral shoulder degenerative changes, levoscoliosis.    < from: CT Abdomen and Pelvis No Cont (06.05.24 @ 01:34) >  ACC: [id number] EXAM:  CT ABDOMEN AND PELVIS   ORDERED BY: MINDI DAILEY     PROCEDURE DATE:  06/05/2024    ******PRELIMINARY REPORT******      ******PRELIMINARY REPORT******           INTERPRETATION:  CLINICAL INFORMATION: Abdominal pain. Acute renal   failure.    COMPARISON: None.    CONTRAST/COMPLICATIONS:  IV Contrast: None  Oral Contrast: None  Complications: None    PROCEDURE:  CT of the Abdomen and Pelvis was performed.  Sagittal and coronal reformats were performed.    FINDINGS:  LOWER CHEST: Cardiomegaly. Hypoattenuating blood pool, suggestive of   anemia. Small bilateral pleural effusions, right greater than left.   Bilateral calcified pleural plaques. Bibasilar atelectasis.    LIVER: Within normal limits.  BILE DUCTS: Normal caliber.  GALLBLADDER: Cholelithiasis.  SPLEEN: Within normal limits.  PANCREAS: Within normal limits.  ADRENALS: Within normal limits.  KIDNEYS/URETERS: 1.2 cm left hyperattenuating renal nodule. 5.3 cm   hyperattenuating cyst on the right. Additional bilateral cysts. No   hydronephrosis or nephrolithiasis.    BLADDER: Within normal limits.  REPRODUCTIVE ORGANS: Fibroid uterus. 1.9 x 2.1 cm hypoattenuating   structure in the right adnexa.    BOWEL: No bowel obstruction. Appendix is not visualized. No evidence of   inflammation in the pericecal region.  PERITONEUM: No ascites. No free air  VESSELS: Atherosclerotic changes.  RETROPERITONEUM/LYMPH NODES: No lymphadenopathy.  ABDOMINAL WALL: Anasarca. Spinal stimulator. Device in the left gluteal   region.  BONES: Degenerative changes. Spinal stimulator lead entrance thecal sac   at T12-L1. Posterior fusion L4-L5.    IMPRESSION:  1. No acute pathology.  2. Small bilateral pleural effusions.  3. Hyperattenuating structure in the right adnexa may represent   subserosal fibroid versus hemorrhagic ovarian cyst. Can consider pelvic   ultrasound for further evaluation, if clinically warranted.  4. Bilateral hyperattenuating renal lesions are indeterminate. Consider   nonemergent CT or MRI renal mass protocol for further evaluation, if   clinically warranted.  5. Additional ancillary findings as above.             Review of Systems : per HPI         Vital Signs Last 24 Hrs  T(C): 36.3 (05 Jun 2024 05:30), Max: 37.9 (04 Jun 2024 22:02)  T(F): 97.4 (05 Jun 2024 05:30), Max: 100.3 (04 Jun 2024 22:02)  HR: 85 (05 Jun 2024 03:19) (84 - 85)  BP: 109/61 (05 Jun 2024 03:19) (106/74 - 109/61)  BP(mean): --  RR: 18 (05 Jun 2024 03:19) (16 - 18)  SpO2: 100% (05 Jun 2024 03:19) (97% - 100%)    Parameters below as of 05 Jun 2024 03:19  Patient On (Oxygen Delivery Method): room air            Physical Exam:  89 y o woman lying comfortably in semi Spencer's position , awake , alert , no acute complaints     Head: normocephalic , atraumatic    Eyes: PERRLA , EOMI , no nystagmus , sclera anicteric    ENT / FACE: neg nasal discharge , uvula midline , no oropharyngeal erythema / exudate    Neck: supple , negative JVD , negative carotid bruits , no thyromegaly    Chest: CTA bilaterally     Cardiovascular: regular rate and rhythm , neg murmurs / rubs / gallops    Abdomen: soft , non distended , no tenderness to palpation in all 4 quadrants ,  normal bowel sounds     Extremities: WWP , neg cyanosis /clubbing / edema     Musculoskeletal: distal Left tib ant/gastroc atrophy     Skin:     :     Neurologic Exam:     Alert and oriented to self     Motor Exam:   limited by effort      > 3+/5 UE's,  R LE > 3/5, L LE 3/5, L foot drop    Sensation:         intact to pinch x 4 extremities     DTR:           biceps/brachioradialis: equal                            patella/ankle: equal          Gait:  not tested         PM&R Impression: admitted for acute on chronic renal failure and hyperkalemia    - chronic L foot drop s/p L discectomy     - deconditioned       Recommendations / Plan:       1) Physical / Occupational therapy focusing on therapeutic exercises , equipment evaluation , bed mobility/transfer out of bed evaluation , progressive ambulation with assistive devices prn .    2) Current disposition plan recommendation:    pending functional progress , probable TING

## 2024-06-05 NOTE — H&P ADULT - PROBLEM SELECTOR PLAN 10
F: s/p in ED, cautious with fluids in HF exacerbation  E: cautious in K repletion in ARF  N: Renal diet  DVT Ppx: Hep subq    Code Status: DNR/DNI

## 2024-06-05 NOTE — PROGRESS NOTE ADULT - PROBLEM SELECTOR PLAN 1
#HF exacerbation  Admitted with proBNP >70K and prominent cardiomegaly on CXR and CTAP. Patient with chronic HFrEF (baseline is EF of 15% per daughter), unclear etiology, possible CAD. Follows with an [facility] Cardiologist, Dr. Murray Dillard. Pt has significant JVD on admission , no LE edema, no crackles.  Home med is Only on toprol 50 XL. Not on any other GDMT (was on Entresto, however did not tolerate it per daughter, likely from renal failure?) Was also previously on metolazone and lasix but stopped by cardiologist. s/p Lasix 40 IVP in ED    - CW Lasix 40mg BID   - strict I's & O's  - daily weights  - Performed formal TTE  - appreciate Cardiology recommendations  - holding home Toprol (avoiding negative inotropes at this time)

## 2024-06-05 NOTE — H&P ADULT - PROBLEM SELECTOR PLAN 4
#Nausea   Likely from uremia, cannot r/o infectious etiology which may explain her abdominal pain and low grade rectal temp  - PRN zofran  - GIPCR

## 2024-06-05 NOTE — PROGRESS NOTE ADULT - ASSESSMENT
89-year-old female history of chronic lower back pain c/b left foot drop (2/2 herniated disc repair sx in 2010), now with spinal cord stimulator, hypertension, breast ca (2014, s/p R breast lumpectomy + radiation but no chemo), Afib (not on AC), HFrEF (baseline EF is 15%) CKD (baseline cr 1.3-1.5), chronic anemia (baseline hgb 8), obstructive uropathy with recently placed mackenzie 2 weeks ago at [facility] and several recent hospitalizations at [facility] in May for hyponatremia and urinary retention who presents today with worsening back pain, lethargy, confusion, nausea, and diarrhea, found to have uremia and acute on chronic renal failure and hyperkalemia.

## 2024-06-05 NOTE — PROGRESS NOTE ADULT - PROBLEM SELECTOR PLAN 6
AMS likely iso renal failure with uremia and electrolyte abnormalities. Infectious etiology possible although no leukocytosis, UA w/o nitrite, RVP negative, and Chest imaging w/o infiltrates. No report of fall or head trauma.   - tx hyperkalemia and renal failure as below  - f/u B12  - f/u TSH  - f/u RPR  - f/u Vitamin B1  - PT/OT  - nutrition consult

## 2024-06-05 NOTE — DIETITIAN INITIAL EVALUATION ADULT - OTHER CALCULATIONS
Needs determined using current body weight 56.2 kg (99%IBW) adjusted for elderly repletion in consideration for ME and acute-on-chronic renal failure

## 2024-06-05 NOTE — DIETITIAN INITIAL EVALUATION ADULT - PROBLEM SELECTOR PLAN 1
#HF exacerbation  Admitted with proBNP >70K and prominent cardiomegaly on CXR and CTAP. Patient with chronic HFrEF (baseline is EF of 15% per daughter), unclear etiology, possible CAD. Follows with an [facility] Cardiologist, Dr. Murray Dillard. Pt has significant JVD on admisison but otherwise is warm, no LE edema, no crackles. POCUS on admission showed severely reduced EF. Home med is Only on toprol 50 XL. Not on any other GDMT (was on Entresto, however did not tolerate it per daughter, likely from renal failure?) Was also previously on metolazone and lasix but stopped by cardiologist.     - s/p Lasix 40 IVP in ED  - strict I's & O's  - daily weights  - obtain TTE  - obtain collateral/records from [facility] (pt had two recent hospitalizations in [facility] mid-late May)  - appreciate Cardiology recommendations  - holding home Toprol (avoiding negative inotropes at this time)

## 2024-06-05 NOTE — H&P ADULT - PROBLEM SELECTOR PLAN 6
Admitted with K 6.7. Hyperkalemia iso acute renal failure as well as chronic obstructive uropathy.    - as above in renal failure

## 2024-06-05 NOTE — CONSULT NOTE ADULT - SUBJECTIVE AND OBJECTIVE BOX
CHIEF COMPLAINT:    HPI:  89-year-old female history of chronic lower back pain c/b left foot drop with spinal cord stimulator, hypertension, Afib (not on AC), HFrEF (baseline EF is 15%) CKD (baseline cr 1.3-1.5), chronic anemia (baseline hgb 8), obstructive uropathy with recently placed mackenzie 2 weeks ago at [facility] and several recent hospitalizations at [facility] in May for hyponatremia and urinary retention who presents today with worsening back pain, lethargy, confusion, nausea, and diarrhea, found to have uremia and acute on chronic renal failure and hyperkalemia.     Patient at baseline is reportedly AOx4 and was ambulating with a walker until the beginning of May per Daughter. Daughter reports pt has been declining since then. She had two hospitalizations at [facility] in May. The 1st was from May 18-23 for hyponatremia. She was discharged to [facility] [place], however sent back from [facility] to [facility] after only two days for inability to void (urinary retention) as well as some dysuria symptoms. Patient had Mackenzie placed for obstructive uropathy at 2nd [facility] hospitalization and was treated for UTI. She was discharged on 5/28 with Cr of 1.5, K of 5.2, Na of 132 and Hgb of 8. Since then, per daughter, pt has been utterly bedbound. Daughter reports today 6/4 pt became acutely more confused and for the past two days she has had more nausea, vomiting, and her chronic back pain seems to be more severe than usual. In the ED she was treated with cefepime for possible UTI and low grade fever (100.3 rectal) and hyperkalemia cocktail for K of 6.7.     At bedside patient with her eyes squeezed shut, complaining of headache. Does open her eyes when asked. When asked about chest pain, shortness of breath, dizziness, patient continues to say she has a headache. When pressed further, patient alternately says yes and then no to questions. Per daughter, patient diet akin to tea and toast at home. Per daughter, patient had been on GDMT and lasix but was recently discontinued by [facility] cardiologist Dr. Murray Shook. Also had Eliquis discontinued by cardiologist because of intermittent BRBPR vs melena for about 1 yr. Last taken Dec '23.    In the ED:  VS: T 99.2, HR 84, /74, RR 16, SpO2 97% on RA  Labs: WBC 9.23, Hgb 8.3 MCV 86.6, Plt 281, Na 127 --> 124, K 6.9 --> 6.2, Cl 95, AG 13 --> 8, BUN 81, Cr 2.68, Trop T 133, proBNP >70,000  VBG: pH 7.32, pCO2 41, pO2 34, HCO3 21, Venous O2 sat 53%  Urine: dark yellow turbid urine, trace ketones, >1000 proteins, mod leuk est, large blood  RVP: negative  EKG: afib w/ ventricular rate 96, QTc 490  Orders: Cefepime 2000mg, Lasix 40mg IVP, Albuterol nebulizer 10mg, Calcium gluconate 1g IV, Humulin 5, 1 amp D50, Sodium bicarb drip 250cc/hr for 2 hrs, Zofran 4 IVP, Toradol 15   (05 Jun 2024 02:09)    Consultant HPI: 89-year-old female history of chronic lower back pain c/b left foot drop with spinal cord stimulator, hypertension, Afib (not on AC), HFrEF (baseline EF is 15%) CKD (baseline cr 1.3-1.5), chronic anemia (baseline hgb 8), obstructive uropathy with recently placed mackenzie 2 weeks ago at [facility] and several recent hospitalizations at [facility] in May for hyponatremia and urinary retention who presents today with worsening back pain, lethargy, confusion, nausea, and diarrhea, found to have uremia and acute on chronic renal failure and hyperkalemia. Pt unreliably answering questions, daughter at bedside denies the patient having any complaints of chest pain or orthopnea, but states her breathing pattern has been different the last few days as if it was more heavy.       PAST MEDICAL & SURGICAL HISTORY:    [ ] Diabetes   [ ] Hypertension  [ ] Hyperlipidemia  [ ] CAD  [ ] PCI  [ ] CABG    PREVIOUS DIAGNOSTIC TESTING:    [ ] Echocardiogram:  [ ] Stress Test:  [ ] Catheterization: 	    FAMILY HISTORY:    SOCIAL HISTORY:    [ ] Non-smoker  [ ] Current Smoker  [ ] Former Smoker  [ ] Alcohol Use  [ ] Drug Use    ALLERGIES/INTOLERANCES:  Aldactone (Rash)    HOME MEDICATIONS:    INPATIENT MEDICATIONS:    aspirin  chewable 81 milliGRAM(s) Oral daily  heparin   Injectable 5000 Unit(s) SubCutaneous every 8 hours    acetaminophen     Tablet .. 975 milliGRAM(s) Oral every 6 hours PRN  atorvastatin 40 milliGRAM(s) Oral at bedtime  HYDROmorphone  Injectable 0.2 milliGRAM(s) IV Push every 4 hours PRN  HYDROmorphone  Injectable 0.5 milliGRAM(s) IV Push every 4 hours PRN  lidocaine   4% Patch 1 Patch Transdermal every 24 hours  pantoprazole    Tablet 40 milliGRAM(s) Oral every 12 hours      REVIEW OF SYSTEMS:        [x ] All other review of systems are negative unless indicated above.  [ ] Unable to obtain due to:    PHYSICAL EXAM:    T(C): 36.3 (06-05-24 @ 05:30), Max: 37.9 (06-04-24 @ 22:02)  HR: 85 (06-05-24 @ 03:19) (84 - 85)  BP: 109/61 (06-05-24 @ 03:19) (106/74 - 109/61)  RR: 18 (06-05-24 @ 03:19) (16 - 18)  SpO2: 100% (06-05-24 @ 03:19) (97% - 100%)  Wt(kg): --    I&O's Summary  GENERAL: NAD  HEENT: Significant JVD  CARDIOVASCULAR: IRR, normal S1 S2, no M/R/G  RESPIRATORY: Lungs clear to auscultation b/l, no C/W/R  VASCULAR: Peripheral pulses palpable 2+ bilaterally  EXTREMITIES: Warm. No LE edema   LINES:    TELEMETRY: AF, rate controlled 	      ECG: AF w/ LBBB   	  	  LABS:                        7.5    7.31  )-----------( 231      ( 05 Jun 2024 04:57 )             22.9     06-05    124<L>  |  94<L>  |  79<H>  ----------------------------<  135<H>  6.2<HH>   |  22  |  2.56<H>    Ca    9.0      05 Jun 2024 00:22  Phos  4.6     06-05  Mg     2.2     06-05    TPro  6.7  /  Alb  3.6  /  TBili  0.4  /  DBili  x   /  AST  28  /  ALT  29  /  AlkPhos  90  06-05      Lipid Profile:   HgA1c:   TSH: Thyroid Stimulating Hormone, Serum: 2.030 uIU/mL (06-05 @ 00:22)      CARDIAC MARKERS:          proBNP:     RADIOLOGY:      ASSESSMENT/PLAN:

## 2024-06-05 NOTE — PROGRESS NOTE ADULT - ATTENDING COMMENTS
Pt appears to have ongoing back pain. I spoke to her daughter and she is getting the info regarding the spinal stimulator and the remote used for programming. We will reach out to her pain management doctors. In addition, likely low sodium is related to low cardiac output state. Avoid fluid overload and ECHO and card f/u. Would await Ur Cx and continue Ertapenem for now for UTI. Discussed plan with pts daughter. K decreased with lokelma. Renal following. Pt appears to have ongoing back pain. I spoke to her daughter and she is getting the info regarding the spinal stimulator and the remote used for programming. We will reach out to her pain management doctors. In addition, likely low sodium is related to low cardiac output state. Check Ur Na and osm and repeat VBG with low venous sat suggestion decreased effective circulating volume. Avoid fluid overload and ECHO and card f/u. Would await Ur Cx and continue Ertapenem for now for UTI. Discussed plan with pts daughter. K decreased with lokelma. Renal following.

## 2024-06-05 NOTE — CONSULT NOTE ADULT - ASSESSMENT
89 yo F w/ PMH of ?CKD3-4 (recent outpatient sCr 2.56 however reportedly had sCr 1.5 at discharge from [facility] approx 1 week ago), s/p mackenzie for obstructive uropathy, HTN, HLD, HFrEF, afib, foot drop s/p spinal cord stimulator, breast ca s/p lumpectomy/RT, presenting with worsening back pain, lethargy, confusion, nausea, and diarrhea. Here patient found to have sCr 2.68 with K 6.9, Na 127, bicarb 19 with AG 13 and venous pH 7.32. SBP 100s. Mackenzie present on admission, UOP 40-60cc/hr recorded thus far. CT a/p without hydronephrosis, bladder collapsed around mackenzie, 1.2cm L renal nodule and 5.3 cm R renal cyst. UA with hematuria, pyuria in setting of mackenzie with UPCR 1.6. Potassium treated with lokelma, furosemide, albuterol, calcium, insulin/dextrose now improved to 5.7. Sodium decreased to 123. Patient lethargic and unable to provide history, obtained from chart review. Nephrology consulted for further management of TODD on CKD and electrolyte disturbance.    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 Less likely obstructive uropathy with mcakenzie in place and no evidence of hydronephrosis on scan.  NAGMA secondary to above  - Given congestion and HF, recommend continuing with diuresis can give IV Lasix 40mg daily to maintain net negative volume status  - Poor EF noted on bedside POCUS - discussed with primary team for consideration of inotrope support if hemodynamics limit diuresis  - Follow up formal echo  - BMP BID for now  - Check AM cortisol  - Continue medical management for hyperkalemia, lokelma 10g q8h  - Can give 50-100meq bicarb push and repeat as needed  - Would avoid giving additional fluids at this time  - Renal diet  - Strict I&O  - Avoid nephrotoxic agents  - Maintain SBP >100 for renal perfusion  - Dose abx for current egfr    Hyponatremia - normal serum osm, suspect related to azotemia.  - Avoid hypotonic fluids  - Fluid restriction <1L/day 89 yo F w/ PMH of ?CKD3-4 (recent outpatient sCr 2.56 however reportedly had sCr 1.5 at discharge from [facility] approx 1 week ago), s/p mackenzie for obstructive uropathy, HTN, HLD, HFrEF, afib, foot drop s/p spinal cord stimulator, breast ca s/p lumpectomy/RT, presenting with worsening back pain, lethargy, confusion, nausea, and diarrhea. Here patient found to have sCr 2.68 with K 6.9, Na 127, bicarb 19 with AG 13 and venous pH 7.32. SBP 100s. Mackenzie present on admission, UOP 40-60cc/hr recorded thus far. CT a/p without hydronephrosis, bladder collapsed around mackenzie, 1.2cm L renal nodule and 5.3 cm R renal cyst. UA with hematuria, pyuria in setting of mackenzie with UPCR 1.6. Potassium treated with lokelma, furosemide, albuterol, calcium, insulin/dextrose now improved to 5.7. Sodium decreased to 123. Patient lethargic and unable to provide history, obtained from chart review. Nephrology consulted for further management of TODD on CKD and electrolyte disturbance.    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 Less likely obstructive uropathy with mackenzie in place and no evidence of hydronephrosis on scan.  NAGMA secondary to above  - Given congestion and HF, recommend continuing with diuresis can give IV Lasix 40mg daily to maintain net negative volume status  - Poor EF noted on bedside POCUS - discussed with primary team for consideration of inotrope support  - Follow up formal echo  - BMP BID for now  - Check AM cortisol  - Continue medical management for hyperkalemia, lokelma 10g q8h  - Can give 50-100meq bicarb push and repeat as needed  - Would avoid giving additional fluids at this time  - Renal diet  - Strict I&O  - Avoid nephrotoxic agents  - Maintain SBP >100 for renal perfusion  - Dose abx for current egfr    Hyponatremia - normal serum osm, suspect related to azotemia.  - Avoid hypotonic fluids  - Fluid restriction <1L/day

## 2024-06-05 NOTE — CONSULT NOTE ADULT - ASSESSMENT
89-year-old female history of chronic lower back pain c/b left foot drop with spinal cord stimulator, hypertension, Afib (not on AC), HFrEF (baseline EF is 15%) CKD (baseline cr 1.3-1.5), chronic anemia (baseline hgb 8), obstructive uropathy with recently placed mackenzie 2 weeks ago at [facility]. A/f hyperkalemia and AMS. Cardiology c/f HFrEF     [facility] Cardiologist: Dr. Murray Dillard    Review of Studies:   ECG: AF w/ LBBB    #HFrEF   Etiology: Unclear  GDMT: Toprol 50mg QD (was on Entresto and Furosemide up until her recent [facility] admission at the end of May, suspect both were d/c'd given her worsening kidney function). Given increased JVD can hold home Toprol for now until pt is more euvolemic, will consider starting later today     Diuretics: As above was on Lasix up until the end of May, s/p 40mg IVP in ED at 1am with reported UOP. Can give additional 40mg at 1pm and c/w 40mg Lasix IVP q12h   No suspicion for cardiogenic shock or low flow state at this time given warm on exam and lack of elevated lactate or LFTs.   Obtain TTE    #AF Stage 4  Stage 4 AF only on Toprol 50mg QD for rate control, not on AC given history of GIB. Has been off AC since 12/2023  - Can continue to hold AC  - Pt currently rate controlled, if HR become elevated can re-introduce beta-blocker with lopressor 12.5mg BID with uptitration to 25mg BID if needed then eventual transition back to home Toprol 50mg QD     #Acute on chronic renal failure  Do not suspect the renal failure is 2/2 to a low-flow state given lack of other perfusion marker defects   - Diuretic as above  - Rest of care per primary team       Recommendations above are preliminary pending discussion with an attending cardiologist    We'll continue to follow, thank you for the consultation      Sherwin Oliva ([id number])  Cardiovascular Disease Fellow           89-year-old female history of chronic lower back pain c/b left foot drop with spinal cord stimulator, hypertension, Afib (not on AC 2/2 GIB), HFrEF (baseline EF is 15%) CKD (baseline cr 1.3-1.5), chronic anemia (baseline hgb 8), obstructive uropathy with recently placed mackenzie 2 weeks ago at [facility]. A/f hyperkalemia and AMS. Cardiology c/f HFrEF     [facility] Cardiologist: Dr. Murray Dillard    Review of Studies:   ECG: AF w/ LBBB    #HFrEF   Etiology: Unclear  GDMT: Toprol 50mg QD (was on Entresto and Furosemide up until her recent [facility] admission at the end of May, suspect both were d/c'd given her worsening kidney function). Given increased JVD can hold home Toprol for now until pt is more euvolemic, will consider starting later today     Diuretics: As above was on Lasix up until the end of May, s/p 40mg IVP in ED at 1am with reported UOP. Can give additional 40mg at 1pm and c/w 40mg Lasix IVP q12h. If pt is not having adequate urine output from diuretics pt would likely need HD/aquaphoresis   No suspicion for cardiogenic shock or low flow state at this time given warm on exam and lack of elevated lactate or LFTs.   Obtain TTE    #AF Stage 4  Stage 4 AF only on Toprol 50mg QD for rate control, not on AC given history of GIB. Has been off AC since 12/2023  - Can continue to hold AC  - Pt currently rate controlled, if HR become elevated can re-introduce beta-blocker with lopressor 12.5mg BID with uptitration to 25mg BID if needed then eventual transition back to home Toprol 50mg QD     #Acute on chronic renal failure  Do not suspect the renal failure is 2/2 to a low-flow state given lack of other perfusion marker defects   - Diuretic as above  - Rest of care per primary team       Recommendations above are preliminary pending discussion with an attending cardiologist    We'll continue to follow, thank you for the consultation      Sherwin Oliva ([id number])  Cardiovascular Disease Fellow           89-year-old female history of chronic lower back pain c/b left foot drop with spinal cord stimulator, hypertension, Afib (not on AC 2/2 GIB), HFrEF (baseline EF is 15%) CKD (baseline cr 1.3-1.5), chronic anemia (baseline hgb 8), obstructive uropathy with recently placed mackenzie 2 weeks ago at [facility]. A/f hyperkalemia and AMS. Cardiology c/f HFrEF     [facility] Cardiologist: Dr. Murray Dillard    Review of Studies:   ECG: AF w/ LBBB    #HFrEF   Etiology: Unclear  GDMT: Toprol 50mg QD (was on Entresto and Furosemide up until her recent [facility] admission at the end of May, suspect both were d/c'd given her worsening kidney function). Given increased JVD can hold home Toprol for now until pt is more euvolemic, will consider starting later today     Diuretics: As above was on Lasix up until the end of May, s/p 40mg IVP in ED at 1am with reported UOP. Can give additional 40mg at 1pm and c/w 40mg Lasix IVP q12h. If pt is not having adequate urine output from diuretics pt would likely need HD/aquaphoresis   No suspicion for cardiogenic shock or low flow state at this time given warm on exam and lack of elevated lactate or LFTs.   Obtain TTE        #AF Stage 4  Stage 4 AF only on Toprol 50mg QD for rate control, not on AC given history of GIB. Has been off AC since 12/2023  - Can continue to hold AC  - Pt currently rate controlled, if HR become elevated can re-introduce beta-blocker with lopressor 12.5mg BID with uptitration to 25mg BID if needed then eventual transition back to home Toprol 50mg QD     #Acute on chronic renal failure  Do not suspect the renal failure is 2/2 to a low-flow state given lack of other perfusion marker defects   - Diuretic as above  - Rest of care per primary team       Recommendations above are preliminary pending discussion with an attending cardiologist    We'll continue to follow, thank you for the consultation      Sherwin Oliva ([id number])  Cardiovascular Disease Fellow      Addendum:    On review, these additional prior studies were available for review:    TTE (2024 [facility]): LV EF 15% with paradoxical septal motion with severe hypokinesis, dilated RV with reduced function, severe LA dilation, severe MR, moderate to severe TR, moderate PH  LH (2023 [facility]): Severe 3VCAD with RCA  w grade III left to right collaterals from mLAD, severe diffusely diseased LAD, moderately diffused Lcx    Outpatient cardiologist ([facility]): Murray Shook MD         89-year-old female history of chronic lower back pain c/b left foot drop with spinal cord stimulator, hypertension, Afib (not on AC 2/2 GIB), HFrEF (baseline EF is 15%) CKD (baseline cr 1.3-1.5), chronic anemia (baseline hgb 8), obstructive uropathy with recently placed mackenzie 2 weeks ago at [facility]. A/f hyperkalemia and AMS. Cardiology c/f HFrEF     [facility] Cardiologist: Dr. Murray Dillard    Review of Studies:   ECG: AF w/ LBBB    #HFrEF   Etiology: Unclear  GDMT: Toprol 50mg QD (was on Entresto and Furosemide up until her recent [facility] admission at the end of May, suspect both were d/c'd given her worsening kidney function). Given increased JVD can hold home Toprol for now until pt is more euvolemic, will consider starting later today     Diuretics: As above was on Lasix up until the end of May, s/p 40mg IVP in ED at 1am with reported UOP. If pt is not having adequate urine output from diuretics pt would likely need HD/aquaphoresis   No suspicion for cardiogenic shock or low flow state at this time given warm on exam and lack of elevated lactate or LFTs.   Obtain TTE        #AF Stage 4  Stage 4 AF only on Toprol 50mg QD for rate control, not on AC given history of GIB. Has been off AC since 12/2023  - Can continue to hold AC  - Pt currently rate controlled, if HR become elevated can re-introduce beta-blocker with lopressor 12.5mg BID with uptitration to 25mg BID if needed then eventual transition back to home Toprol 50mg QD     #Acute on chronic renal failure  Do not suspect the renal failure is 2/2 to a low-flow state given lack of other perfusion marker defects   - Diuretic as above  - Rest of care per primary team       Recommendations above are preliminary pending discussion with an attending cardiologist    We'll continue to follow, thank you for the consultation      Sherwin Oliva ([id number])  Cardiovascular Disease Fellow      Addendum:    On review, these additional prior studies were available for review:    TTE (2024 [facility]): LV EF 15% with paradoxical septal motion with severe hypokinesis, dilated RV with reduced function, severe LA dilation, severe MR, moderate to severe TR, moderate PH  LHC (2023 [facility]): Severe 3VCAD with RCA  w grade III left to right collaterals from mLAD, severe diffusely diseased LAD, moderately diffused Lcx    Outpatient cardiologist ([facility]): Murray Shook MD

## 2024-06-05 NOTE — PATIENT PROFILE ADULT - FUNCTIONAL ASSESSMENT - BASIC MOBILITY 6.
1-calculated by average/Not able to assess (calculate score using [facility] averaging method)

## 2024-06-05 NOTE — DIETITIAN INITIAL EVALUATION ADULT - PERTINENT LABORATORY DATA
06-05    121<L>  |  92<L>  |  82<H>  ----------------------------<  103<H>  6.0<H>   |  20<L>  |  2.79<H>    Ca    8.9      05 Jun 2024 11:29  Phos  4.9     06-05  Mg     2.1     06-05    TPro  6.3  /  Alb  3.5  /  TBili  0.5  /  DBili  x   /  AST  27  /  ALT  27  /  AlkPhos  88  06-05  POCT Blood Glucose.: 134 mg/dL (06-05-24 @ 04:26)  A1C with Estimated Average Glucose Result: 5.4 % (06-04-24 @ 22:57)

## 2024-06-05 NOTE — PROGRESS NOTE ADULT - PROBLEM SELECTOR PLAN 4
#History of obstructive Uropathy  #Hyperkalemia   Suspect worsening renal failure is a combination of low flow cardiac state and GI losses and poor PO intake     - Start lokelma 10 TID with hold parameters  - Repeat BMP q 4 hours  - Urine studies   - Continue with mackenzie catheter   - I/Os #History of obstructive Uropathy  #Hyperkalemia   Suspect worsening renal failure is a combination of low flow cardiac state and GI losses and poor PO intake     - Start lokelma 10 TID with hold parameters  - Repeat BMP q 4 hours  - Urine studies   - Continue with mackenzie catheter   - I/Os  - Renal diet  - Avoid nephrotoxic agents

## 2024-06-05 NOTE — H&P ADULT - NSHPSOCIALHISTORY_GEN_ALL_CORE
Lives alone. Has HHA 7 days a week, 10 hrs per day. Daughter also visits regularly.  daughter (Imani) [phone]  grandson (Rufino) [phone]

## 2024-06-05 NOTE — PROGRESS NOTE ADULT - PROBLEM SELECTOR PLAN 10
Hgb stable since discharge from [facility] on 5/28. No evidence of bleeding although daughter states she has had some rectal bleeding recently on/off and was supposed to get a colonoscopy but deferred due to cardiac issues. Also with weight loss.   - Iron studies, B12, folate, retic, LDH, Hapto   - Active Type and screen  - Transfuse for Hgb <8 given her CAD and elevated troponin on presentation

## 2024-06-05 NOTE — PROGRESS NOTE ADULT - PROBLEM SELECTOR PLAN 2
Admitted with K 6.7. Hyperkalemia iso acute renal failure as well as chronic obstructive uropathy.    - Start lokelma 10 TID with hold parameters  - Trend EKGs  - Duonebs as needed Admitted with K 6.7. Hyperkalemia iso acute renal failure as well as chronic obstructive uropathy.    - Start lokelma 10 TID with hold parameters  - Trend EKGs  - Duonebs as needed  - Can give 50-100meq bicarb push and repeat as needed

## 2024-06-05 NOTE — CONSULT NOTE ADULT - ASSESSMENT
{\rtf1\potcji10515\ansi\elvybiq3040\ftnbj\uc1\deff0  {\fonttbl{\f0 \fnil Segoe UI;}{\f1 \fnil \fcharset0 Segoe UI;}{\f2 \fnil Times New Nadeem;}}  {\colortbl ;\cqu252\xzrjq876\kptb898 ;\red0\green0\blue0 ;\red0\green0\pxuv633 ;\red0\green0\blue0 ;}  {\stylesheet{\f0\fs20 Normal;}{\cs1 Default Paragraph Font;}{\cs2\f0\fs16 Line Number;}{\cs3\f2\fs24\ul\cf3 Hyperlink;}}  {\*\revtbl{Unknown;}}  \sbepuv66236\zynijm41317\owjax2732\clpfv6076\smuzi3629\mgnxx9032\bpyyfxb769\hiczkdr604\nogrowautofit\hzulay079\formshade\nofeaturethrottle1\dntblnsbdb\fet4\aendnotes\aftnnrlc\pgbrdrhead\pgbrdrfoot  \sectd\talzej09349\npdmzo49171\guttersxn0\mmwniqyt5848\ohalmeli4237\wdbmpgft1633\grlyxtnh4787\kldclpb417\xkyuqwx424\sbkpage\pgncont\pgndec  \plain\plain\f0\fs24\ql\plain\f0\fs24\plain\f0\fs20\xmew5050\hich\f0\dbch\f0\loch\f0\fs20\par  I M\par  \par  89 year old female history of chronic lower back pain c/b left foot drop (2/2 herniated disc repair sx in 2010), now with spinal cord stimulator, hypertension, breast ca (2014, s/p R breast lumpectomy + radiation but no chemo), Afib (not on AC), HFrEF   (baseline EF is 15%) CKD (baseline cr 1.3-1.5), chronic anemia (baseline hgb 8), obstructive uropathy with recently placed mackenzie 2 weeks ago at [facility] and several recent hospitalizations at [facility] in May for hyponatremia and urinary retention who presents today   with worsening back pain, lethargy, confusion, nausea, and diarrhea, found to have uremia and acute on chronic renal failure and hyperkalemia. \par  \plain\f1\fs20\clsg5484\hich\f1\dbch\f1\loch\f1\cf2\fs20\strike\plain\f1\fs20\sqwc1398\hich\f1\dbch\f1\loch\f1\cf2\fs20\plain\f0\fs20\xesl4893\hich\f0\dbch\f0\loch\f0\fs20\par  \plain\f1\fs20\vnpl6956\hich\f1\dbch\f1\loch\f1\cf2\fs20\ul{\field{\*\fldinst HYPERLINK [id number] }{\fldrslt Problem/Plan - 1:}}\plain\f0\fs20\sqtq3922\hich\f0\dbch\f0\loch\f0\fs20\ql\par  \'b7  {\*\bkmkstart [id number]}{\*\bkmkend xp78763509131}Problem: {\*\bkmkstart hk18641555941}{\*\bkmkend kj40062281952}Acute on chronic HFrEF (heart failure with reduced ejection fraction). \par  \'b7  {\*\bkmkstart je53223959862}{\*\bkmkend vs75411990702}Plan: {\*\bkmkstart wt36258547078}{\*\bkmkend ra09683065776}#HF exacerbation\par  Admitted with proBNP >70K and prominent cardiomegaly on CXR and CTAP. Patient with chronic HFrEF (baseline is EF of 15% per daughter), unclear etiology, possible CAD. Follows with an [facility] Cardiologist, Dr. Murray Dillard. Pt has significant JVD on admisison   but otherwise is warm, no LE edema, no crackles. POCUS on admission showed severely reduced EF. Home med is Only on toprol 50 XL. Not on any other GDMT (was on Entresto, however did not tolerate it per daughter, likely from renal failure?) Was also previously   on metolazone and lasix but stopped by cardiologist. \par  \par  - s/p Lasix 40 IVP in ED\par  - strict I's & O's\par  - daily weights\par  - obtain TTE\par  - obtain collateral/records from [facility] (pt had two recent hospitalizations in [facility] mid-late May)\par  - appreciate Cardiology recommendations\par  - holding home Toprol (avoiding negative inotropes at this time).\par  \par  \plain\f1\fs20\rblw6430\hich\f1\dbch\f1\loch\f1\cf2\fs20\ul{\field{\*\fldinst HYPERLINK [id number] }{\fldrslt Problem/Plan - 2:}}\plain\f0\fs20\zclc0463\hich\f0\dbch\f0\loch\f0\fs20\ql\par  \'b7  {\*\bkmkstart um25611933488}{\*\bkmkend vi63426080987}Problem: {\*\bkmkstart jz47145864973}{\*\bkmkend mq14955196984}Metabolic encephalopathy. \par  \'b7  {\*\bkmkstart yq35157545673}{\*\bkmkend tf46242302668}Plan: {\*\bkmkstart ir48349731160}{\*\bkmkend cw12432211494}AMS likely iso renal failure with uremia and electrolyte abnormalities. Infectious etiology possible although no leukocytosis, UA w/o   nitrite, RVP negative, and Chest imaging w/o infiltrates. No report of fall or head trauma. \par  - tx hyperkalemia and renal failure as below\par  - f/u B12\par  - f/u TSH\par  - f/u RPR\par  - f/u Vitamin B1\par  - PT/OT\par  - nutrition consult.\par  \par  \plain\f1\fs20\lbcr8367\hich\f1\dbch\f1\loch\f1\cf2\fs20\ul{\field{\*\fldinst HYPERLINK [id number] }{\fldrslt Problem/Plan - 3:}}\plain\f0\fs20\wsuy8760\hich\f0\dbch\f0\loch\f0\fs20\ql\par  \'b7  {\*\bkmkstart wt96546484250}{\*\bkmkend up21198666214}Problem: {\*\bkmkstart xq91422557899}{\*\bkmkend vr88340537312}Chronic atrial fibrillation. \par  \'b7  {\*\bkmkstart ku73134568219}{\*\bkmkend ef02031287996}Plan: {\*\bkmkstart lu23699711397}{\*\bkmkend hs69266395322}Daughter states this is chronic. Patient is not on AC for unclear reasons. CHADSVASC of 6. She had rectal bleeding at outside hospital   several weeks ago and scope was deferred due to her cardiac condition. Rectal bleeding has stopped per daughter and hgb is stable since discharge and her fecal occult was negative. \par  - consider initiating full AC, will defer to cardiology\par  - had been on Eliquis in the past but was stopped approx Dec '23 given on and off rectal bleed.  \par  - Hold off on Toprol given concern for low flow state and marginal pressures and patient is currently rate controlled.\par  \par  \plain\f1\fs20\oqcn5021\hich\f1\dbch\f1\loch\f1\cf2\fs20\ul{\field{\*\fldinst HYPERLINK [id number] }{\fldrslt Problem/Plan - 4:}}\plain\f0\fs20\ebnz3744\hich\f0\dbch\f0\loch\f0\fs20\ql\par  \'b7  {\*\bkmkstart gy05820321939}{\*\bkmkend wr03277630559}Problem: {\*\bkmkstart bt34054927294}{\*\bkmkend fm31551401507}Diarrhea. \par  \'b7  {\*\bkmkstart wy78071331342}{\*\bkmkend tt80689499731}Plan: {\*\bkmkstart lq49057163792}{\*\bkmkend er88433972016}#Nausea \par  Likely from uremia, cannot r/o infectious etiology which may explain her abdominal pain and low grade rectal temp\par  - PRN zofran\par  - GIPCR.\par  \par  \plain\f1\fs20\rsxp9898\hich\f1\dbch\f1\loch\f1\cf2\fs20\ul{\field{\*\fldinst HYPERLINK [id number] }{\fldrslt Problem/Plan - 5:}}\plain\f0\fs20\bczc9974\hich\f0\dbch\f0\loch\f0\fs20\ql\par  \'b7  {\*\bkmkstart kz90861920717}{\*\bkmkend ky27628061303}Problem: {\*\bkmkstart nr25701531312}{\*\bkmkend mz11165524500}Acute on chronic renal failure. \par  \'b7  {\*\bkmkstart ll46431097197}{\*\bkmkend vx63804162247}Plan: {\*\bkmkstart cn26403104731}{\*\bkmkend uc57126754214}#History of obstructive Uropathy\par  #Hyperkalemia \par  Suspect worsening renal failure is a combination of low flow cardiac state and GI losses and poor PO intake \par  - Would give additional insulin 5 and D50 dose now\par  - Start lokelma 10 TID with hold parameters\par  - Repeat BMP in 2 hours\par  - Urine studies \par  - FU CT read \par  - Continue with mackenzie catheter \par  - I/Os\par  - Renal consult, K is improving with medical managemt and she is making some urine, no urgent needs for HD.\par  \par  \plain\f1\fs20\dwbn7589\hich\f1\dbch\f1\loch\f1\cf2\fs20\ul{\field{\*\fldinst HYPERLINK [id number] }{\fldrslt Problem/Plan - 6:}}\plain\f0\fs20\ovde3506\hich\f0\dbch\f0\loch\f0\fs20\ql\par  \'b7  {\*\bkmkstart xe07788976034}{\*\bkmkend uy73179270367}Problem: {\*\bkmkstart cu44450798656}{\*\bkmkend kt71053474023}Hyperkalemia. \par  \'b7  {\*\bkmkstart sr83978611149}{\*\bkmkend my92526810573}Plan: {\*\bkmkstart bu34650251992}{\*\bkmkend kq24558262993}Admitted with K 6.7. Hyperkalemia iso acute renal failure as well as chronic obstructive uropathy.\par  \par  - as above in renal failure.\par  \par  \plain\f1\fs20\cmxj3650\hich\f1\dbch\f1\loch\f1\cf2\fs20\ul{\field{\*\fldinst HYPERLINK [id number] }{\fldrslt Problem/Plan - 7:}}\plain\f0\fs20\gppj6207\hich\f0\dbch\f0\loch\f0\fs20\ql\par  \'b7  {\*\bkmkstart of89406499880}{\*\bkmkend mu99871545309}Problem: {\*\bkmkstart pc05907540050}{\*\bkmkend gr90847329127}Low grade fever. \par  \'b7  {\*\bkmkstart qh28335539495}{\*\bkmkend ci58694068358}Plan: {\*\bkmkstart hz90624348708}{\*\bkmkend ng77234500536}Not meeting SIRS criteria in the ED, Had mackenzie placed 2 wks ago at [facility] for obstructive uropathy, pt was discharged to [facility] with mackenzie,   which was exchanged in ED. Initial UA sent had mod LE and no nitrites however sample was too small to do cell count\par  - repeat UA w/ Cx\par  - f/u blood cultures \par  - Monitor for further fevers\par  - s/p dose of cefepime in ED, monitor off antibiotics unless decompensates.\par  \par  \plain\f1\fs20\iznt3242\hich\f1\dbch\f1\loch\f1\cf2\fs20\ul{\field{\*\fldinst HYPERLINK [id number] }{\fldrslt Problem/Plan - 8:}}\plain\f0\fs20\frht1047\hich\f0\dbch\f0\loch\f0\fs20\ql\par  \'b7  {\*\bkmkstart oy43205541778}{\*\bkmkend xm22532384912}Problem: {\*\bkmkstart zt53068862903}{\*\bkmkend xx97388489646}Acute exacerbation of chronic low back pain. \par  \'b7  {\*\bkmkstart on57289206655}{\*\bkmkend yr53945744985}Plan: {\*\bkmkstart nn20064743673}{\*\bkmkend lb11431743535}Patient has chronic back pain and a spinal stimulator. Now complaining of significant back pain on admission, appears musculoskeletal   in nature. Concerning for compression fracture vs muscle spasm vs malignancy vs kidney stone \par  - f/u CTAP\par  - Lidocaine patch\par  - Tylenol\par  - Avoid NSAIDS\par  - 0.4 IV dilaudid for severe pain.\par  \par  \plain\f1\fs20\gell3828\hich\f1\dbch\f1\loch\f1\cf2\fs20\ul{\field{\*\fldinst HYPERLINK [id number] }{\fldrslt Problem/Plan - 9:}}\plain\f0\fs20\ompy4909\hich\f0\dbch\f0\loch\f0\fs20\ql\par  \'b7  {\*\bkmkstart mt31020301307}{\*\bkmkend mz27735658670}Problem: {\*\bkmkstart ph94526234816}{\*\bkmkend wb70238066270}Normocytic anemia. \par  \'b7  {\*\bkmkstart ye05849727158}{\*\bkmkend uj81727166864}Plan: {\*\bkmkstart wg92217382540}{\*\bkmkend vs22739369518}Hgb stable since discharge from [facility] on 5/28. No evidence of bleeding although daughter states she has had some rectal bleeding recently   on/off and was supposed to get a colonoscopy but deferred due to cardiac issues. Also with weight loss. \par  - Iron studies, B12, folate, retic, LDH, Hapto \par  - Active Type and screen\par  - Transfuse for Hgb <8 given her CAD and elevated troponin on presentation \par  - f/u CTAP.\par  \par  \plain\f1\fs20\gnlh0216\hich\f1\dbch\f1\loch\f1\cf2\fs20\ul{\field{\*\fldinst HYPERLINK [id number] }{\fldrslt Problem/Plan - 10:}}\plain\f0\fs20\obqx2781\hich\f0\dbch\f0\loch\f0\fs20\ql\par  \'b7  {\*\bkmkstart td52554663312}{\*\bkmkend ev01035449199}Problem: {\*\bkmkstart qy17285215247}{\*\bkmkend sy60790648644}Prophylactic measure. \par  \'b7  {\*\bkmkstart nt74667981588}{\*\bkmkend fz09524485825}Plan; {\*\bkmkstart hj03851425338}{\*\bkmkend qz29511551748}F: s/p in ED, cautious with fluids in HF exacerbation\par  E: cautious in K repletion in ARF\par  N: Renal diet\par  DVT Ppx: Hep subq\par  \par  Code Status: DNR/DN\plain\f1\fs16\vmnf9450\hich\f1\dbch\f1\loch\f1\cf2\fs16 I.\par  \plain\f0\fs20\mieq5739\hich\f0\dbch\f0\loch\f0\fs20\par  }

## 2024-06-05 NOTE — ED ADULT NURSE NOTE - NSFALLHARMRISKINTERV_ED_ALL_ED
Assistance OOB with selected safe patient handling equipment if applicable/Communicate risk of Fall with Harm to all staff, patient, and family/Monitor gait and stability/Monitor for mental status changes and reorient to person, place, and time, as needed/Move patient closer to nursing station/within visual sight of ED staff/Provide patient with walking aids/Provide visual cue: red socks, yellow wristband, yellow gown, etc/Reinforce activity limits and safety measures with patient and family/Toileting schedule using arm’s reach rule for commode and bathroom/Use of alarms - bed, stretcher, chair and/or video monitoring/Bed in lowest position, wheels locked, appropriate side rails in place/Call bell, personal items and telephone in reach/Instruct patient to call for assistance before getting out of bed/chair/stretcher/Non-slip footwear applied when patient is off stretcher/[place] to call system/Physically safe environment - no spills, clutter or unnecessary equipment/Purposeful Proactive Rounding/Room/bathroom lighting operational, light cord in reach

## 2024-06-05 NOTE — ED PROVIDER NOTE - PHYSICAL EXAMINATION
VITAL SIGNS: I have reviewed nursing notes and confirm.  CONSTITUTIONAL: Well appearing, in no acute distress. Eyes closed but open to voice somewhat somnolent  SKIN:  warm and dry, no acute rash.   HEAD:  normocephalic, atraumatic.  EYES: EOM intact; conjunctiva and sclera clear.  ENT: No nasal discharge; airway clear.   NECK: Supple.  CARD: S1, S2, Regular rate and rhythm.   RESP:  Clear to auscultation b/l, no wheezes, rales or rhonchi.  ABD: Normal bowel sounds; soft; non-distended; non-tender; no guarding/ rebound. mild suprapubic tenderness without rebound or guarding no CVA tenderness  EXT:  no lower extremity edema  NEURO: Alert, oriented, grossly unremarkable at baseline mental status  PSYCH: Cooperative, mood and affect appropriate. pleasant

## 2024-06-05 NOTE — H&P ADULT - PROBLEM SELECTOR PLAN 5
#History of obstructive Uropathy  #Hyperkalemia   Suspect worsening renal failure is a combination of low flow cardiac state and GI losses and poor PO intake   - Would give additional insulin 5 and D50 dose now  - Start lokelma 10 TID with hold parameters  - Repeat BMP in 2 hours  - Urine studies   - FU CT read   - Continue with mackenzie catheter   - I/Os  - Renal consult, K is improving with medical managemt and she is making some urine, no urgent needs for HD

## 2024-06-05 NOTE — H&P ADULT - PROBLEM SELECTOR PLAN 3
Hx of afib, no longer on eliquis (had filled scripts before).   Home med = Metoprolol succ ER 50 qd. Previously on Eliquis 2.5 BID    - holding Toprol for now  - f/u Cardiology recs Daughter states this is chronic. Patient is not on AC for unclear reasons. CHADSVASC of 6. She had rectal bleeding at outside hospital several weeks ago and scope was deferred due to her cardiac condition. Rectal bleeding has stopped per daughter and hgb is stable since discharge and her fecal occult was negative.   - consider initiating full AC, will defer to cardiology recommendations, no contraindications to starting at this time  - has been on Eliquis 2.5 in the past per surescripts   - Hold off on Toprol given concern for low flow state and marginal pressures and patient is currently rate controlled Daughter states this is chronic. Patient is not on AC for unclear reasons. CHADSVASC of 6. She had rectal bleeding at outside hospital several weeks ago and scope was deferred due to her cardiac condition. Rectal bleeding has stopped per daughter and hgb is stable since discharge and her fecal occult was negative.   - consider initiating full AC, will defer to cardiology  - had been on Eliquis in the past but was stopped approx Dec '23 given on and off rectal bleed.    - Hold off on Toprol given concern for low flow state and marginal pressures and patient is currently rate controlled

## 2024-06-05 NOTE — H&P ADULT - TREATMENT GUIDELINES
Pt failed TOV a several times  - now with martinez, may need chronic martinez Pt failed TOV a several times  - now with martinez, may need chronic martinez  - urology follow up outpatient DNR/DNI

## 2024-06-05 NOTE — H&P ADULT - PROBLEM SELECTOR PLAN 7
DDX: Compression fracture vs Muscle spasm vs Malignancy vs Kidney stone   - FU CT read  - Lidocaine patch  - Tylenol  - Avoid NSAIDS  - 0.4 IV dilaudid for severe pain Not meeting SIRS criteria in the ED, Had mackenzie placed 2 wks ago at [facility] for obstructive uropathy, pt was discharged to [facility] with mackenzie, which was exchanged in ED. Initial UA sent had mod LE and no nitrites however sample was too small to do cell count  - repeat UA w/ Cx  - f/u blood cultures   - Monitor for further fevers  - s/p dose of cefepime in ED, monitor off antibiotics unless decompensates

## 2024-06-05 NOTE — DIETITIAN INITIAL EVALUATION ADULT - PROBLEM SELECTOR PLAN 9
Hgb stable since discharge from [facility] on 5/28. No evidence of bleeding although daughter states she has had some rectal bleeding recently on/off and was supposed to get a colonoscopy but deferred due to cardiac issues. Also with weight loss.   - Iron studies, B12, folate, retic, LDH, Hapto   - Active Type and screen  - Transfuse for Hgb <8 given her CAD and elevated troponin on presentation   - f/u CTAP

## 2024-06-05 NOTE — H&P ADULT - PROBLEM SELECTOR PLAN 9
F: s/p in ED, cautious with fluids in HF exacerbation  E: cautious in K repletion in ARF  N: Renal diet  DVT Ppx: Hep subq    Code Status: DNR/DNI Hgb stable since discharge from [facility] on 5/28. No evidence of bleeding although daughter states she has had some rectal bleeding recently on/off and was supposed to get a colonoscopy but deferred due to cardiac issues. Also with weight loss.   - Iron studies, B12, folate, retic, LDH, Hapto   - Active Type and screen  - Transfuse for Hgb <8 given her CAD and elevated troponin on presentation   - f/u CTAP

## 2024-06-05 NOTE — CONSULT NOTE ADULT - SUBJECTIVE AND OBJECTIVE BOX
ICU CONSULT NOTE    HPI:    VITAL SIGNS:  T(F): 100.3 (06-04-24 @ 22:02)  HR: 84 (06-04-24 @ 21:13)  BP: 106/74 (06-04-24 @ 21:13)  RR: 16 (06-04-24 @ 21:13)  SpO2: 97% (06-04-24 @ 21:13)    PHYSICAL EXAM:    Constitutional: Laying in bed, no acute distress, appears stated age, well nurished   Neurological: AAOx3, answers all questions appropriately, CN Grossly intact, no FNDs, can move all 4 extremities  HEENT: PERRL, EOMI, sclera non-icteric, neck supple, no masses, no JVD, MMM, good dentition  Respiratory: No labored breathing or respiratory distress, CTA b/l, good air entry b/l, no wheezing,  no crackles/rales, without accessory muscle use and no intercostal retractions  Cardiovascular: RRR, normal S1S2, no murmurs or rubs   Gastrointestinal: soft, non tender, non distended, neg hepatojugular reflex, no masses or overt organomegaly palpable, BS normal  Extremities: Warm, well perfused, pulses equal bilateral upper and lower extremities, no edema, no clubbing, no evidence of cyanosis   Skin: Normal temperature, warm, dry. No rashes or lesions     MEDICATIONS  (STANDING):  sodium zirconium cyclosilicate 10 Gram(s) Oral every 8 hours    Allergies    Aldactone (Rash)    Intolerances    LABS:                        8.3    9.23  )-----------( 281      ( 04 Jun 2024 22:57 )             25.8     06-05    124<L>  |  94<L>  |  79<H>  ----------------------------<  135<H>  6.2<HH>   |  22  |  2.56<H>    Ca    9.0      05 Jun 2024 00:22    TPro  6.7  /  Alb  3.6  /  TBili  0.4  /  DBili  x   /  AST  28  /  ALT  29  /  AlkPhos  90  06-05      Urinalysis Basic - ( 05 Jun 2024 00:22 )    Color: x / Appearance: x / SG: x / pH: x  Gluc: 135 mg/dL / Ketone: x  / Bili: x / Urobili: x   Blood: x / Protein: x / Nitrite: x   Leuk Esterase: x / RBC: x / WBC x   Sq Epi: x / Non Sq Epi: x / Bacteria: x          RADIOLOGY & ADDITIONAL TESTS:  Reviewed     ICU CONSULT NOTE    HPI:  89-year-old female history of chronic lower back pain c/b left foot drop with spinal cord stimulator, hypertension, Afib (not on AC), HFrEF (baseline ef is 15%) CKD (baseline cr 1.3-1.5), chronic anemia (baseline hgb 8), obstructive uropathy with recently placed mackenzie 2 weeks ago, several recent [facility] hospitalizations in May for hyponatremia and urinary retention who presents today with worsening backpain, lethargy, confusion, nausea, and diarrhea, found to have worsening uremia and renal failure and hyperkalemia for which ICU was consulted.       with recent admission to [facility] for hyponatremia renal failure and hyperkalemia discharged with Mackenzie catheter last changed 2 weeks ago here today with body aches  acute on chronic lower back pain 2 episodes of emesis as well as loose stools and decreased ability to ambulate/generalized weakness.  Patient denies chest pain or shortness of breath but patient's daughter at the bedside states that patient has been breathing more heavily over the past 2 days.  No fever chills cough.  Does not have pain in the Mackenzie catheter site but is complaining of lower abdominal discomfort as well.  No hematochezia or melena   But has had prior GI bleeding.  Is on aspirin daily as well as metoprolol.  Patient wishes to be DNR but not DNI is open to medications that would prevent her heart from stopping but does not want to be shocked with electricity or to have CPR performed, is open to having a trial intubation if needed.    VITAL SIGNS:  T(F): 100.3 (06-04-24 @ 22:02)  HR: 84 (06-04-24 @ 21:13)  BP: 106/74 (06-04-24 @ 21:13)  RR: 16 (06-04-24 @ 21:13)  SpO2: 97% (06-04-24 @ 21:13)    PHYSICAL EXAM:    Constitutional: Laying in bed, no acute distress, appears stated age, well nurished   Neurological: AAOx3, answers all questions appropriately, CN Grossly intact, no FNDs, can move all 4 extremities  HEENT: PERRL, EOMI, sclera non-icteric, neck supple, no masses, no JVD, MMM, good dentition  Respiratory: No labored breathing or respiratory distress, CTA b/l, good air entry b/l, no wheezing,  no crackles/rales, without accessory muscle use and no intercostal retractions  Cardiovascular: RRR, normal S1S2, no murmurs or rubs   Gastrointestinal: soft, non tender, non distended, neg hepatojugular reflex, no masses or overt organomegaly palpable, BS normal  Extremities: Warm, well perfused, pulses equal bilateral upper and lower extremities, no edema, no clubbing, no evidence of cyanosis   Skin: Normal temperature, warm, dry. No rashes or lesions     MEDICATIONS  (STANDING):  sodium zirconium cyclosilicate 10 Gram(s) Oral every 8 hours    Allergies    Aldactone (Rash)    Intolerances    LABS:                        8.3    9.23  )-----------( 281      ( 04 Jun 2024 22:57 )             25.8     06-05    124<L>  |  94<L>  |  79<H>  ----------------------------<  135<H>  6.2<HH>   |  22  |  2.56<H>    Ca    9.0      05 Jun 2024 00:22    TPro  6.7  /  Alb  3.6  /  TBili  0.4  /  DBili  x   /  AST  28  /  ALT  29  /  AlkPhos  90  06-05    Urinalysis Basic - ( 05 Jun 2024 00:22 )    Color: x / Appearance: x / SG: x / pH: x  Gluc: 135 mg/dL / Ketone: x  / Bili: x / Urobili: x   Blood: x / Protein: x / Nitrite: x   Leuk Esterase: x / RBC: x / WBC x   Sq Epi: x / Non Sq Epi: x / Bacteria: x      RADIOLOGY & ADDITIONAL TESTS:  Reviewed     ICU CONSULT NOTE    HPI:  89-year-old female history of chronic lower back pain c/b left foot drop with spinal cord stimulator, hypertension, Afib (not on AC), HFrEF (baseline ef is 15%) CKD (baseline cr 1.3-1.5), chronic anemia (baseline hgb 8), obstructive uropathy with recently placed mackenzie 2 weeks ago, several recent [facility] hospitalizations in May for hyponatremia and urinary retention who presents today with worsening backpain, lethargy, confusion, nausea, and diarrhea, found to have worsening uremia and renal failure and hyperkalemia for which ICU was consulted. Patient at baseline is AOx4 and was ambulating with a walker until the beginning of May per Daughter       with recent admission to [facility] for hyponatremia renal failure and hyperkalemia discharged with Mackenzie catheter last changed 2 weeks ago here today with body aches  acute on chronic lower back pain 2 episodes of emesis as well as loose stools and decreased ability to ambulate/generalized weakness.  Patient denies chest pain or shortness of breath but patient's daughter at the bedside states that patient has been breathing more heavily over the past 2 days.  No fever chills cough.  Does not have pain in the Mackenzie catheter site but is complaining of lower abdominal discomfort as well.  No hematochezia or melena   But has had prior GI bleeding.  Is on aspirin daily as well as metoprolol.  Patient wishes to be DNR but not DNI is open to medications that would prevent her heart from stopping but does not want to be shocked with electricity or to have CPR performed, is open to having a trial intubation if needed.    VITAL SIGNS:  T(F): 100.3 (06-04-24 @ 22:02)  HR: 84 (06-04-24 @ 21:13)  BP: 106/74 (06-04-24 @ 21:13)  RR: 16 (06-04-24 @ 21:13)  SpO2: 97% (06-04-24 @ 21:13)    PHYSICAL EXAM:    Constitutional: Laying in bed, no acute distress, appears stated age, well nurished   Neurological: AAOx3, answers all questions appropriately, CN Grossly intact, no FNDs, can move all 4 extremities  HEENT: PERRL, EOMI, sclera non-icteric, neck supple, no masses, no JVD, MMM, good dentition  Respiratory: No labored breathing or respiratory distress, CTA b/l, good air entry b/l, no wheezing,  no crackles/rales, without accessory muscle use and no intercostal retractions  Cardiovascular: RRR, normal S1S2, no murmurs or rubs   Gastrointestinal: soft, non tender, non distended, neg hepatojugular reflex, no masses or overt organomegaly palpable, BS normal  Extremities: Warm, well perfused, pulses equal bilateral upper and lower extremities, no edema, no clubbing, no evidence of cyanosis   Skin: Normal temperature, warm, dry. No rashes or lesions     MEDICATIONS  (STANDING):  sodium zirconium cyclosilicate 10 Gram(s) Oral every 8 hours    Allergies    Aldactone (Rash)    Intolerances    LABS:                        8.3    9.23  )-----------( 281      ( 04 Jun 2024 22:57 )             25.8     06-05    124<L>  |  94<L>  |  79<H>  ----------------------------<  135<H>  6.2<HH>   |  22  |  2.56<H>    Ca    9.0      05 Jun 2024 00:22    TPro  6.7  /  Alb  3.6  /  TBili  0.4  /  DBili  x   /  AST  28  /  ALT  29  /  AlkPhos  90  06-05    Urinalysis Basic - ( 05 Jun 2024 00:22 )    Color: x / Appearance: x / SG: x / pH: x  Gluc: 135 mg/dL / Ketone: x  / Bili: x / Urobili: x   Blood: x / Protein: x / Nitrite: x   Leuk Esterase: x / RBC: x / WBC x   Sq Epi: x / Non Sq Epi: x / Bacteria: x      RADIOLOGY & ADDITIONAL TESTS:  Reviewed     ICU CONSULT NOTE    HPI:  89-year-old female history of chronic lower back pain c/b left foot drop with spinal cord stimulator, hypertension, Afib (not on AC), HFrEF (baseline ef is 15%) CKD (baseline cr 1.3-1.5), chronic anemia (baseline hgb 8), obstructive uropathy with recently placed mackenzie 2 weeks ago, several recent [facility] hospitalizations in May for hyponatremia and urinary retention who presents today with worsening backpain, lethargy, confusion, nausea, and diarrhea, found to have worsening uremia and renal failure and hyperkalemia for which ICU was consulted. Patient at baseline is AOx4 and was ambulating with a walker until the beginning of May per Daughter. She reports she has been declining since then. She has had two hospitalizations at [facility] in May. One from May 18-23 for hyponatremia. She was discharged to [facility] and then sent back for urinary retention and a mackenzie was placed for obstructive uropathy and she was treated for UTI. She was discharged on 5/28 with Cr of 1.5, K of 5.2, Na of 132 and Hgb of 8. Since then she has not gotten out of bed. Daughter reports today she was more confused and for the past two days she has had more nausea, vomiting, and her chronic back pain seems to be more severe than usual. In the ED she was treated with cefepime for possible UTI anf low grade fever and hyperkalemia cocktail for K of 6.7.     VITAL SIGNS:  T(F): 100.3 (06-04-24 @ 22:02)  HR: 84 (06-04-24 @ 21:13)  BP: 106/74 (06-04-24 @ 21:13)  RR: 16 (06-04-24 @ 21:13)  SpO2: 97% (06-04-24 @ 21:13)    PHYSICAL EXAM:    Constitutional: Laying in bed, no acute distress, appears stated age, well nurished   Neurological: AAOx1-2, answers all questions appropriately, CN Grossly intact, no FNDs, can move all 4 extremities  HEENT: PERRL, EOMI, sclera non-icteric, neck supple, no masses, +JVD, MMM, good dentition  Respiratory: No labored breathing or respiratory distress, CTA b/l, good air entry b/l, no wheezing,  no crackles/rales, without accessory muscle use and no intercostal retractions  Cardiovascular: irregular rhythm, normal S1S2, no murmurs or rubs   Gastrointestinal: soft, tender to deep palpation, non distended, neg hepatojugular reflex, no masses or overt organomegaly palpable, BS normal  MSK: Tenderness over the lumbar spine  Extremities: Warm, well perfused, pulses equal bilateral upper and lower extremities, no edema, no clubbing, no evidence of cyanosis   Skin: Normal temperature, warm, dry. No rashes or lesions     MEDICATIONS  (STANDING):  sodium zirconium cyclosilicate 10 Gram(s) Oral every 8 hours    Allergies    Aldactone (Rash)    Intolerances    LABS:                        8.3    9.23  )-----------( 281      ( 04 Jun 2024 22:57 )             25.8     06-05    124<L>  |  94<L>  |  79<H>  ----------------------------<  135<H>  6.2<HH>   |  22  |  2.56<H>    Ca    9.0      05 Jun 2024 00:22    TPro  6.7  /  Alb  3.6  /  TBili  0.4  /  DBili  x   /  AST  28  /  ALT  29  /  AlkPhos  90  06-05    Urinalysis Basic - ( 05 Jun 2024 00:22 )    Color: x / Appearance: x / SG: x / pH: x  Gluc: 135 mg/dL / Ketone: x  / Bili: x / Urobili: x   Blood: x / Protein: x / Nitrite: x   Leuk Esterase: x / RBC: x / WBC x   Sq Epi: x / Non Sq Epi: x / Bacteria: x      RADIOLOGY & ADDITIONAL TESTS:  Reviewed

## 2024-06-05 NOTE — PATIENT PROFILE ADULT - FALL HARM RISK - HARM RISK INTERVENTIONS
Assistance with ambulation/Assistance OOB with selected safe patient handling equipment/Communicate Risk of Fall with Harm to all staff/Reinforce activity limits and safety measures with patient and family/Tailored Fall Risk Interventions/Visual Cue: Yellow wristband and red socks/Bed in lowest position, wheels locked, appropriate side rails in place/Call bell, personal items and telephone in reach/Instruct patient to call for assistance before getting out of bed or chair/Non-slip footwear when patient is out of bed/[place] to call system/Physically safe environment - no spills, clutter or unnecessary equipment/Purposeful Proactive Rounding/Room/bathroom lighting operational, light cord in reach Assistance with ambulation/Assistance OOB with selected safe patient handling equipment/Communicate Risk of Fall with Harm to all staff/Discuss with provider need for PT consult/Monitor gait and stability/Provide patient with walking aids - walker, cane, crutches/Reinforce activity limits and safety measures with patient and family/Tailored Fall Risk Interventions/Visual Cue: Yellow wristband and red socks/Bed in lowest position, wheels locked, appropriate side rails in place/Call bell, personal items and telephone in reach/Instruct patient to call for assistance before getting out of bed or chair/Non-slip footwear when patient is out of bed/[place] to call system/Physically safe environment - no spills, clutter or unnecessary equipment/Purposeful Proactive Rounding/Room/bathroom lighting operational, light cord in reach

## 2024-06-05 NOTE — DIETITIAN INITIAL EVALUATION ADULT - PERTINENT MEDS FT
MEDICATIONS  (STANDING):  aspirin  chewable 81 milliGRAM(s) Oral daily  atorvastatin 40 milliGRAM(s) Oral at bedtime  heparin   Injectable 5000 Unit(s) SubCutaneous every 8 hours  lidocaine   4% Patch 1 Patch Transdermal every 24 hours  pantoprazole    Tablet 40 milliGRAM(s) Oral every 12 hours  sodium zirconium cyclosilicate 10 Gram(s) Oral every 8 hours  thiamine 100 milliGRAM(s) Oral every 24 hours    MEDICATIONS  (PRN):  acetaminophen     Tablet .. 975 milliGRAM(s) Oral every 6 hours PRN Temp greater or equal to 38C (100.4F), Mild Pain (1 - 3)  HYDROmorphone  Injectable 0.2 milliGRAM(s) IV Push every 4 hours PRN Moderate Pain (4 - 6)  HYDROmorphone  Injectable 0.5 milliGRAM(s) IV Push every 4 hours PRN Severe Pain (7 - 10)

## 2024-06-05 NOTE — ED ADULT NURSE REASSESSMENT NOTE - NS ED NURSE REASSESS COMMENT FT1
Patient remains on full CM for close obs, stopped bicarb gtt at this time per ICU resident who is at the bedside for eval.

## 2024-06-05 NOTE — H&P ADULT - ATTENDING COMMENTS
89 F with  hypertension, Afib (not on AC), HFrEF (baseline ef is 15%), CKD (baseline cr 1.3-1.5), chronic anemia (baseline hgb 8), obstructive uropathy with recently placed Zepeda 2 weeks ago, chronic lower back pain c/b left foot drop a/p spinal cord stimulator, and several recent [facility] hospitalizations for hyponatremia and urinary retention who presents today with worsening back pain, lethargy, confusion, nausea, and diarrhea. Labs significant for elevated creatinine and potassium and hyponatremia. Physical exam as above.   1. 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 Hyperkalemia  3. Hyponatremia  4. HFrEF  - Lokelma  - dextrose/insulin  - CT abdomen  - repeat UA  - procalcitonin  - serial trop and ECG

## 2024-06-05 NOTE — H&P ADULT - NSHPLABSRESULTS_GEN_ALL_CORE
8.3    9.23  )-----------( 281      ( 04 Jun 2024 22:57 )             25.8       06-05    124<L>  |  94<L>  |  79<H>  ----------------------------<  135<H>  6.2<HH>   |  22  |  2.56<H>    Ca    9.0      05 Jun 2024 00:22    TPro  6.7  /  Alb  3.6  /  TBili  0.4  /  DBili  x   /  AST  28  /  ALT  29  /  AlkPhos  90  06-05              Urinalysis Basic - ( 05 Jun 2024 00:22 )    Color: x / Appearance: x / SG: x / pH: x  Gluc: 135 mg/dL / Ketone: x  / Bili: x / Urobili: x   Blood: x / Protein: x / Nitrite: x   Leuk Esterase: x / RBC: x / WBC x   Sq Epi: x / Non Sq Epi: x / Bacteria: x                  CAPILLARY BLOOD GLUCOSE      POCT Blood Glucose.: 242 mg/dL (05 Jun 2024 01:17)

## 2024-06-05 NOTE — CONSULT NOTE ADULT - ATTENDING COMMENTS
89 F with  hypertension, Afib (not on AC), HFrEF (baseline ef is 15%), CKD (baseline cr 1.3-1.5), chronic anemia (baseline hgb 8), obstructive uropathy with recently placed Zepeda 2 weeks ago, chronic lower back pain c/b left foot drop a/p spinal cord stimulator, and several recent [facility] hospitalizations for hyponatremia and urinary retention who presents today with worsening back pain, lethargy, confusion, nausea, and diarrhea. Labs significant for elevated creatinine and potassium and hyponatremia. Physical exam as above.   1. 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 Hyperkalemia  3. Hyponatremia  4. HFrEF  - Lokelma  - dextrose/insulin  - CT abdomen  - repeat UA  - procalcitonin  - serial trop and ECG
I agree with the fellow's findings and plans as written above with the following additions/amendments:    Seen and examined at bedside. POCUS as above. Long conversation with patient's daughter regarding history, overall has been declining given multiple hospitalizations and inability to tolerate GDMT for heart failure. Will monitor closely, consider inotropy if possible otherwise diurese to offload preload, BPs low to use afterload reduction. Further recs as above, discussed in detail with primary team
Patient is a 89-Yo Old Female with Pmhx of Advanced ICM with EF of 15% 2/2 Unrevasculartized CAD, AFib no longer on Eliquis 2/2 GIB, CKD (Baseline of 1.3-1.5), Chronic back pain with Left foot drop s/p Spinal Cord Stimulator, obstructive uropathy with recently placed mackenzie 2 weeks ago at OSH, BIBEMS for worsening back pain, poor PO intake, loose stools and diarrhea, found to have worsening renal failure. Cardiology consulted for HFrED    Outpatient cardiologist ([facility]): Cardiologist: Dr. Murray Dillard    Review of Studies:  - ECG 06/05/2024 : AF w/ LBBB  - TTE 06/05/2024: Severely reduced left ventricular systolic function, ejection fraction 15-20%. Global left ventricular hypokinesis. With echocontrast imaging, flow stasis is noted at the apex. Early thrombus formation cannot be excluded. Recommend interval imaging. Dilated left ventricular size.  Normal right ventricular size and systolic function. Biatrial enlargement. Moderate mitral regurgitation. Moderate-to-severe tricuspid regurgitation.  Pulmonary hypertension present, pulmonary artery systolic pressure is 35 mmHg, assuming a right atrial pressure of 8 mmHg.  - TTE (2024 [facility]): LV EF 15% with paradoxical septal motion with severe hypokinesis, dilated RV with reduced function, severe LA dilation, severe MR, moderate to severe TR, moderate PH  - LHC (2023 [facility]): Severe 3VCAD with RCA  w grade III left to right collaterals from mLAD, severe diffusely diseased LAD, moderately diffused Lcx    Outpatient cardiologist ([facility]): Murray Shook MD    # Advanced ICM Stage C, NYHA Class III  - Patient has extensive, unrevascularized 3 VCAD with resulting advanced ICM with EF of 10-15% with LV dilation.   - She underwent LHC in 2023 at OSH. At that time she was found to have a RCA  as well as severe diffuse LAD and LCx disease that were not amenable to intervention  - Patient was previously on Entresto and Toprol but after recent hospitalization for obstructive uropathy, diuresis and ARNI were discontinued and patient maintained only on Toprol  - Daughter reports patient had been fairly functional in her Day to day up to 1 month prior, however patient herself states she hasn't been well for a long time.  - ROS is notable today for bilateral flank pain, abdominal discomfort and urinary urgency. Patient denies dyspnea or SOB  - Echo this hospitalization is grossly unchanged from recent outpatient one with EF of 15%, severely dilated LV with concern for flow stais at the apex and multivalvular disease namely Moderate MR and Moderate to Severe TR  - Clinically patient is warm, well perfused with elevated JVP, and no lower extremity edema.  - CT abdomen reviewed. Lungs assessment though limited only revealed Small bilateral pleural effusions and no  pulmonary edema  - Labs revealed a normal lactate on admission with a Mixed venous Sat of 53%, normal LFTS and renal failure  - At this time do not believe patient to be in Decompensated heart failure or cardiogenic shock though she has severe advanced Cardiomyopathy with overall poor prognosis as she is unable to tolerate GDMT and has unrevascularized severe CAD  - I think she will ultimately require HD if aligns with families' GOC  - Would maintain OFF beta blockers and ARNI for now   - Currently no need for inotrope therapy at this time, though will need daily reassessment, however if markers of perfusion worsen, we will have to discuss long term utility of starting inotropes. Patient currentlty DNR/DNI. Would recommend palliative consult to further delineate GOC. Patient appears extremely uncomfortable on assessment today.   - Please obtain CMP, lactate, Mixed venous with am labs on 6/6  - Cardiology will continue to follow with you, please call with any questions

## 2024-06-05 NOTE — PROGRESS NOTE ADULT - PROBLEM SELECTOR PLAN 3
Patient has chronic back pain and a spinal stimulator. Now complaining of significant back pain on admission, appears musculoskeletal in nature. Concerning for compression fracture vs muscle spasm vs malignancy vs kidney stone     - Adjustment of the spinal stimulator, will be performed today by a technician who always perform this task of the patient  - Lidocaine patch  - Tylenol  - Avoid NSAIDS  - 0.2 and 0.5 prn IV dilaudid for severe pain

## 2024-06-06 DIAGNOSIS — I21.A1 MYOCARDIAL INFARCTION TYPE 2: ICD-10-CM

## 2024-06-06 DIAGNOSIS — R53.2 FUNCTIONAL QUADRIPLEGIA: ICD-10-CM

## 2024-06-06 DIAGNOSIS — R53.81 OTHER MALAISE: ICD-10-CM

## 2024-06-06 DIAGNOSIS — Z71.89 OTHER SPECIFIED COUNSELING: ICD-10-CM

## 2024-06-06 LAB
ALBUMIN SERPL ELPH-MCNC: 3 G/DL — LOW (ref 3.3–5)
ALP SERPL-CCNC: 82 U/L — SIGNIFICANT CHANGE UP (ref 40–120)
ALT FLD-CCNC: 23 U/L — SIGNIFICANT CHANGE UP (ref 10–45)
ANION GAP SERPL CALC-SCNC: 12 MMOL/L — SIGNIFICANT CHANGE UP (ref 5–17)
ANION GAP SERPL CALC-SCNC: 12 MMOL/L — SIGNIFICANT CHANGE UP (ref 5–17)
ANION GAP SERPL CALC-SCNC: 13 MMOL/L — SIGNIFICANT CHANGE UP (ref 5–17)
AST SERPL-CCNC: 18 U/L — SIGNIFICANT CHANGE UP (ref 10–40)
BASE EXCESS BLDV CALC-SCNC: -3.2 MMOL/L — LOW (ref -2–3)
BASOPHILS # BLD AUTO: 0.03 K/UL — SIGNIFICANT CHANGE UP (ref 0–0.2)
BASOPHILS # BLD AUTO: 0.04 K/UL — SIGNIFICANT CHANGE UP (ref 0–0.2)
BASOPHILS NFR BLD AUTO: 0.6 % — SIGNIFICANT CHANGE UP (ref 0–2)
BASOPHILS NFR BLD AUTO: 0.8 % — SIGNIFICANT CHANGE UP (ref 0–2)
BILIRUB SERPL-MCNC: 0.4 MG/DL — SIGNIFICANT CHANGE UP (ref 0.2–1.2)
BLD GP AB SCN SERPL QL: NEGATIVE — SIGNIFICANT CHANGE UP
BUN SERPL-MCNC: 83 MG/DL — HIGH (ref 7–23)
BUN SERPL-MCNC: 83 MG/DL — HIGH (ref 7–23)
BUN SERPL-MCNC: 85 MG/DL — HIGH (ref 7–23)
CALCIUM SERPL-MCNC: 8.8 MG/DL — SIGNIFICANT CHANGE UP (ref 8.4–10.5)
CALCIUM SERPL-MCNC: 8.9 MG/DL — SIGNIFICANT CHANGE UP (ref 8.4–10.5)
CALCIUM SERPL-MCNC: 9.1 MG/DL — SIGNIFICANT CHANGE UP (ref 8.4–10.5)
CHLORIDE SERPL-SCNC: 90 MMOL/L — LOW (ref 96–108)
CHLORIDE SERPL-SCNC: 92 MMOL/L — LOW (ref 96–108)
CHLORIDE SERPL-SCNC: 96 MMOL/L — SIGNIFICANT CHANGE UP (ref 96–108)
CO2 BLDV-SCNC: 24 MMOL/L — SIGNIFICANT CHANGE UP (ref 22–26)
CO2 SERPL-SCNC: 20 MMOL/L — LOW (ref 22–31)
CO2 SERPL-SCNC: 21 MMOL/L — LOW (ref 22–31)
CO2 SERPL-SCNC: 22 MMOL/L — SIGNIFICANT CHANGE UP (ref 22–31)
CREAT SERPL-MCNC: 2.98 MG/DL — HIGH (ref 0.5–1.3)
CREAT SERPL-MCNC: 3.06 MG/DL — HIGH (ref 0.5–1.3)
CREAT SERPL-MCNC: 3.09 MG/DL — HIGH (ref 0.5–1.3)
EGFR: 14 ML/MIN/1.73M2 — LOW
EOSINOPHIL # BLD AUTO: 0.08 K/UL — SIGNIFICANT CHANGE UP (ref 0–0.5)
EOSINOPHIL # BLD AUTO: 0.09 K/UL — SIGNIFICANT CHANGE UP (ref 0–0.5)
EOSINOPHIL NFR BLD AUTO: 1.6 % — SIGNIFICANT CHANGE UP (ref 0–6)
EOSINOPHIL NFR BLD AUTO: 1.7 % — SIGNIFICANT CHANGE UP (ref 0–6)
FOLATE SERPL-MCNC: >20 NG/ML — SIGNIFICANT CHANGE UP
GLUCOSE BLDC GLUCOMTR-MCNC: 93 MG/DL — SIGNIFICANT CHANGE UP (ref 70–99)
GLUCOSE BLDC GLUCOMTR-MCNC: 97 MG/DL — SIGNIFICANT CHANGE UP (ref 70–99)
GLUCOSE SERPL-MCNC: 76 MG/DL — SIGNIFICANT CHANGE UP (ref 70–99)
GLUCOSE SERPL-MCNC: 84 MG/DL — SIGNIFICANT CHANGE UP (ref 70–99)
GLUCOSE SERPL-MCNC: 85 MG/DL — SIGNIFICANT CHANGE UP (ref 70–99)
HCO3 BLDV-SCNC: 23 MMOL/L — SIGNIFICANT CHANGE UP (ref 22–29)
HCT VFR BLD CALC: 20.8 % — CRITICAL LOW (ref 34.5–45)
HCT VFR BLD CALC: 25.1 % — LOW (ref 34.5–45)
HGB BLD-MCNC: 6.9 G/DL — CRITICAL LOW (ref 11.5–15.5)
HGB BLD-MCNC: 8.4 G/DL — LOW (ref 11.5–15.5)
IMM GRANULOCYTES NFR BLD AUTO: 0.4 % — SIGNIFICANT CHANGE UP (ref 0–0.9)
IMM GRANULOCYTES NFR BLD AUTO: 0.6 % — SIGNIFICANT CHANGE UP (ref 0–0.9)
LYMPHOCYTES # BLD AUTO: 0.82 K/UL — LOW (ref 1–3.3)
LYMPHOCYTES # BLD AUTO: 0.97 K/UL — LOW (ref 1–3.3)
LYMPHOCYTES # BLD AUTO: 15.7 % — SIGNIFICANT CHANGE UP (ref 13–44)
LYMPHOCYTES # BLD AUTO: 19.8 % — SIGNIFICANT CHANGE UP (ref 13–44)
MAGNESIUM SERPL-MCNC: 2 MG/DL — SIGNIFICANT CHANGE UP (ref 1.6–2.6)
MAGNESIUM SERPL-MCNC: 2.1 MG/DL — SIGNIFICANT CHANGE UP (ref 1.6–2.6)
MAGNESIUM SERPL-MCNC: 2.2 MG/DL — SIGNIFICANT CHANGE UP (ref 1.6–2.6)
MCHC RBC-ENTMCNC: 28 PG — SIGNIFICANT CHANGE UP (ref 27–34)
MCHC RBC-ENTMCNC: 28.6 PG — SIGNIFICANT CHANGE UP (ref 27–34)
MCHC RBC-ENTMCNC: 33.2 GM/DL — SIGNIFICANT CHANGE UP (ref 32–36)
MCHC RBC-ENTMCNC: 33.5 GM/DL — SIGNIFICANT CHANGE UP (ref 32–36)
MCV RBC AUTO: 84.6 FL — SIGNIFICANT CHANGE UP (ref 80–100)
MCV RBC AUTO: 85.4 FL — SIGNIFICANT CHANGE UP (ref 80–100)
MONOCYTES # BLD AUTO: 0.58 K/UL — SIGNIFICANT CHANGE UP (ref 0–0.9)
MONOCYTES # BLD AUTO: 0.59 K/UL — SIGNIFICANT CHANGE UP (ref 0–0.9)
MONOCYTES NFR BLD AUTO: 11.3 % — SIGNIFICANT CHANGE UP (ref 2–14)
MONOCYTES NFR BLD AUTO: 11.9 % — SIGNIFICANT CHANGE UP (ref 2–14)
NEUTROPHILS # BLD AUTO: 3.21 K/UL — SIGNIFICANT CHANGE UP (ref 1.8–7.4)
NEUTROPHILS # BLD AUTO: 3.64 K/UL — SIGNIFICANT CHANGE UP (ref 1.8–7.4)
NEUTROPHILS NFR BLD AUTO: 65.7 % — SIGNIFICANT CHANGE UP (ref 43–77)
NEUTROPHILS NFR BLD AUTO: 69.9 % — SIGNIFICANT CHANGE UP (ref 43–77)
NRBC # BLD: 0 /100 WBCS — SIGNIFICANT CHANGE UP (ref 0–0)
NRBC # BLD: 0 /100 WBCS — SIGNIFICANT CHANGE UP (ref 0–0)
PCO2 BLDV: 43 MMHG — HIGH (ref 39–42)
PH BLDV: 7.33 — SIGNIFICANT CHANGE UP (ref 7.32–7.43)
PHOSPHATE SERPL-MCNC: 4.9 MG/DL — HIGH (ref 2.5–4.5)
PHOSPHATE SERPL-MCNC: 5.1 MG/DL — HIGH (ref 2.5–4.5)
PHOSPHATE SERPL-MCNC: 5.4 MG/DL — HIGH (ref 2.5–4.5)
PLATELET # BLD AUTO: 243 K/UL — SIGNIFICANT CHANGE UP (ref 150–400)
PLATELET # BLD AUTO: 270 K/UL — SIGNIFICANT CHANGE UP (ref 150–400)
PO2 BLDV: <33 MMHG — LOW (ref 25–45)
POTASSIUM SERPL-MCNC: 5 MMOL/L — SIGNIFICANT CHANGE UP (ref 3.5–5.3)
POTASSIUM SERPL-MCNC: 5 MMOL/L — SIGNIFICANT CHANGE UP (ref 3.5–5.3)
POTASSIUM SERPL-MCNC: 5.3 MMOL/L — SIGNIFICANT CHANGE UP (ref 3.5–5.3)
POTASSIUM SERPL-SCNC: 5 MMOL/L — SIGNIFICANT CHANGE UP (ref 3.5–5.3)
POTASSIUM SERPL-SCNC: 5 MMOL/L — SIGNIFICANT CHANGE UP (ref 3.5–5.3)
POTASSIUM SERPL-SCNC: 5.3 MMOL/L — SIGNIFICANT CHANGE UP (ref 3.5–5.3)
PROT SERPL-MCNC: 5.8 G/DL — LOW (ref 6–8.3)
RBC # BLD: 2.46 M/UL — LOW (ref 3.8–5.2)
RBC # BLD: 2.94 M/UL — LOW (ref 3.8–5.2)
RBC # FLD: 14.6 % — HIGH (ref 10.3–14.5)
RBC # FLD: 14.7 % — HIGH (ref 10.3–14.5)
RH IG SCN BLD-IMP: POSITIVE — SIGNIFICANT CHANGE UP
SAO2 % BLDV: 32.4 % — LOW (ref 67–88)
SODIUM SERPL-SCNC: 122 MMOL/L — LOW (ref 135–145)
SODIUM SERPL-SCNC: 127 MMOL/L — LOW (ref 135–145)
SODIUM SERPL-SCNC: 129 MMOL/L — LOW (ref 135–145)
VIT B12 SERPL-MCNC: 980 PG/ML — SIGNIFICANT CHANGE UP (ref 232–1245)
WBC # BLD: 4.89 K/UL — SIGNIFICANT CHANGE UP (ref 3.8–10.5)
WBC # BLD: 5.21 K/UL — SIGNIFICANT CHANGE UP (ref 3.8–10.5)
WBC # FLD AUTO: 4.89 K/UL — SIGNIFICANT CHANGE UP (ref 3.8–10.5)
WBC # FLD AUTO: 5.21 K/UL — SIGNIFICANT CHANGE UP (ref 3.8–10.5)

## 2024-06-06 PROCEDURE — 76775 US EXAM ABDO BACK WALL LIM: CPT | Mod: 26

## 2024-06-06 PROCEDURE — 76856 US EXAM PELVIC COMPLETE: CPT | Mod: 26

## 2024-06-06 PROCEDURE — 99233 SBSQ HOSP IP/OBS HIGH 50: CPT

## 2024-06-06 PROCEDURE — 99233 SBSQ HOSP IP/OBS HIGH 50: CPT | Mod: GC

## 2024-06-06 PROCEDURE — 99223 1ST HOSP IP/OBS HIGH 75: CPT

## 2024-06-06 PROCEDURE — 99232 SBSQ HOSP IP/OBS MODERATE 35: CPT

## 2024-06-06 PROCEDURE — 93971 EXTREMITY STUDY: CPT | Mod: 26,LT

## 2024-06-06 PROCEDURE — 70450 CT HEAD/BRAIN W/O DYE: CPT | Mod: 26

## 2024-06-06 RX ORDER — POLYETHYLENE GLYCOL 3350 17 G/17G
17 POWDER, FOR SOLUTION ORAL EVERY 24 HOURS
Refills: 0 | Status: DISCONTINUED | OUTPATIENT
Start: 2024-06-06 | End: 2024-06-08

## 2024-06-06 RX ORDER — CEFTRIAXONE 500 MG/1
1000 INJECTION, POWDER, FOR SOLUTION INTRAMUSCULAR; INTRAVENOUS EVERY 24 HOURS
Refills: 0 | Status: DISCONTINUED | OUTPATIENT
Start: 2024-06-06 | End: 2024-06-08

## 2024-06-06 RX ORDER — ACETAMINOPHEN 500 MG
1000 TABLET ORAL ONCE
Refills: 0 | Status: DISCONTINUED | OUTPATIENT
Start: 2024-06-06 | End: 2024-06-06

## 2024-06-06 RX ORDER — SODIUM ZIRCONIUM CYCLOSILICATE 10 G/10G
5 POWDER, FOR SUSPENSION ORAL ONCE
Refills: 0 | Status: COMPLETED | OUTPATIENT
Start: 2024-06-06 | End: 2024-06-06

## 2024-06-06 RX ORDER — HYDROMORPHONE HYDROCHLORIDE 2 MG/ML
0.2 INJECTION INTRAMUSCULAR; INTRAVENOUS; SUBCUTANEOUS EVERY 4 HOURS
Refills: 0 | Status: DISCONTINUED | OUTPATIENT
Start: 2024-06-06 | End: 2024-06-07

## 2024-06-06 RX ORDER — HYDROMORPHONE HYDROCHLORIDE 2 MG/ML
0.5 INJECTION INTRAMUSCULAR; INTRAVENOUS; SUBCUTANEOUS EVERY 4 HOURS
Refills: 0 | Status: DISCONTINUED | OUTPATIENT
Start: 2024-06-06 | End: 2024-06-07

## 2024-06-06 RX ORDER — SENNA PLUS 8.6 MG/1
2 TABLET ORAL AT BEDTIME
Refills: 0 | Status: DISCONTINUED | OUTPATIENT
Start: 2024-06-06 | End: 2024-06-08

## 2024-06-06 RX ADMIN — LIDOCAINE 1 PATCH: 4 CREAM TOPICAL at 05:52

## 2024-06-06 RX ADMIN — SODIUM ZIRCONIUM CYCLOSILICATE 10 GRAM(S): 10 POWDER, FOR SUSPENSION ORAL at 05:52

## 2024-06-06 RX ADMIN — SODIUM ZIRCONIUM CYCLOSILICATE 5 GRAM(S): 10 POWDER, FOR SUSPENSION ORAL at 01:26

## 2024-06-06 RX ADMIN — HYDROMORPHONE HYDROCHLORIDE 0.2 MILLIGRAM(S): 2 INJECTION INTRAMUSCULAR; INTRAVENOUS; SUBCUTANEOUS at 07:39

## 2024-06-06 RX ADMIN — LIDOCAINE 1 PATCH: 4 CREAM TOPICAL at 07:13

## 2024-06-06 RX ADMIN — Medication 100 GRAM(S): at 00:54

## 2024-06-06 RX ADMIN — HYDROMORPHONE HYDROCHLORIDE 0.2 MILLIGRAM(S): 2 INJECTION INTRAMUSCULAR; INTRAVENOUS; SUBCUTANEOUS at 07:55

## 2024-06-06 RX ADMIN — ATORVASTATIN CALCIUM 40 MILLIGRAM(S): 80 TABLET, FILM COATED ORAL at 21:55

## 2024-06-06 RX ADMIN — HYDROMORPHONE HYDROCHLORIDE 0.2 MILLIGRAM(S): 2 INJECTION INTRAMUSCULAR; INTRAVENOUS; SUBCUTANEOUS at 00:47

## 2024-06-06 RX ADMIN — SODIUM ZIRCONIUM CYCLOSILICATE 10 GRAM(S): 10 POWDER, FOR SUSPENSION ORAL at 21:55

## 2024-06-06 RX ADMIN — HYDROMORPHONE HYDROCHLORIDE 0.5 MILLIGRAM(S): 2 INJECTION INTRAMUSCULAR; INTRAVENOUS; SUBCUTANEOUS at 20:55

## 2024-06-06 RX ADMIN — HYDROMORPHONE HYDROCHLORIDE 0.5 MILLIGRAM(S): 2 INJECTION INTRAMUSCULAR; INTRAVENOUS; SUBCUTANEOUS at 21:10

## 2024-06-06 RX ADMIN — Medication 81 MILLIGRAM(S): at 12:24

## 2024-06-06 RX ADMIN — SODIUM ZIRCONIUM CYCLOSILICATE 10 GRAM(S): 10 POWDER, FOR SUSPENSION ORAL at 15:01

## 2024-06-06 RX ADMIN — HYDROMORPHONE HYDROCHLORIDE 0.2 MILLIGRAM(S): 2 INJECTION INTRAMUSCULAR; INTRAVENOUS; SUBCUTANEOUS at 01:10

## 2024-06-06 RX ADMIN — Medication 100 MILLIGRAM(S): at 07:44

## 2024-06-06 RX ADMIN — PANTOPRAZOLE SODIUM 40 MILLIGRAM(S): 20 TABLET, DELAYED RELEASE ORAL at 05:52

## 2024-06-06 RX ADMIN — HEPARIN SODIUM 5000 UNIT(S): 5000 INJECTION INTRAVENOUS; SUBCUTANEOUS at 15:01

## 2024-06-06 RX ADMIN — CEFTRIAXONE 1000 MILLIGRAM(S): 500 INJECTION, POWDER, FOR SOLUTION INTRAMUSCULAR; INTRAVENOUS at 12:24

## 2024-06-06 RX ADMIN — HEPARIN SODIUM 5000 UNIT(S): 5000 INJECTION INTRAVENOUS; SUBCUTANEOUS at 21:55

## 2024-06-06 RX ADMIN — HEPARIN SODIUM 5000 UNIT(S): 5000 INJECTION INTRAVENOUS; SUBCUTANEOUS at 05:53

## 2024-06-06 NOTE — PHYSICAL THERAPY INITIAL EVALUATION ADULT - PREDICTED DURATION OF THERAPY (DAYS/WKS), PT EVAL
Pt. would benefit from continued PT f/u to improve strength, balance, endurance, functional mobility.

## 2024-06-06 NOTE — CONSULT NOTE ADULT - SUBJECTIVE AND OBJECTIVE BOX
HPI:  89-year-old female history of chronic lower back pain c/b left foot drop (2/2 herniated disc repair sx in 2010), now with spinal cord stimulator, hypertension, breast ca (2014, s/p R breast lumpectomy + radiation but no chemo), Afib (not on AC), HFrEF (baseline EF is 15%) CKD (baseline cr 1.3-1.5), chronic anemia (baseline hgb 8), obstructive uropathy with recently placed mackenzie 2 weeks ago at [facility] and several recent hospitalizations at [facility] in May for hyponatremia and urinary retention who presents today with worsening back pain, lethargy, confusion, nausea, and diarrhea, found to have uremia and acute on chronic renal failure and hyperkalemia.     Patient at baseline is reportedly AOx4 and was ambulating with a walker until the beginning of May per Daughter. Daughter reports pt has been declining since then. She had two hospitalizations at [facility] in May. The 1st was from May 18-23 for hyponatremia. She was discharged to [facility], however sent back from [facility] to [facility] after only two days for inability to void (urinary retention) as well as some dysuria symptoms. Patient had Mackenzie placed for obstructive uropathy at 2nd [facility] hospitalization and was treated for UTI. She was discharged on 5/28 with Cr of 1.5, K of 5.2, Na of 132 and Hgb of 8. Since then, per daughter, pt has been utterly bedbound. Daughter reports today 6/4 pt became acutely more confused and for the past two days she has had more nausea, vomiting, and her chronic back pain seems to be more severe than usual. In the ED she was treated with cefepime for possible UTI and low grade fever (100.3 rectal) and hyperkalemia cocktail for K of 6.7.     At bedside patient with her eyes squeezed shut, complaining of headache. Does open her eyes when asked. When asked about chest pain, shortness of breath, dizziness, patient continues to say she has a headache. When pressed further, patient alternately says yes and then no to questions. Per daughter, patient diet akin to tea and toast at home. Lives alone but has HHA 7 days per wk, 10hrs per day. Daughter also visits regularly. Per daughter, patient had been on GDMT and lasix but was recently discontinued by [facility] cardiologist Dr. Murray Shook. Also had Eliquis discontinued by cardiologist because of intermittent BRBPR vs melena for about 1 yr. Last taken Dec '23.    In the ED:  VS: T 99.2, HR 84, /74, RR 16, SpO2 97% on RA  Labs: WBC 9.23, Hgb 8.3 MCV 86.6, Plt 281, Na 127 --> 124, K 6.9 --> 6.2, Cl 95, AG 13 --> 8, BUN 81, Cr 2.68, Trop T 133, proBNP >70,000  VBG: pH 7.32, pCO2 41, pO2 34, HCO3 21, Venous O2 sat 53%  Urine: dark yellow turbid urine, trace ketones, >1000 proteins, mod leuk est, large blood  RVP: negative  EKG: afib w/ ventricular rate 96, QTc 490  Orders: Cefepime 2000mg, Lasix 40mg IVP, Albuterol nebulizer 10mg, Calcium gluconate 1g IV, Humulin 5, 1 amp D50, Sodium bicarb drip 250cc/hr for 2 hrs, Zofran 4 IVP, Toradol 15   (05 Jun 2024 02:09)    PERTINENT PM/SXH:   CHF  CKD    FAMILY HISTORY:  No history of dementia in first degree relatives according to chart  Unable to obtain due to patient's encephalopathy    ITEMS NOT CHECKED ARE NOT PRESENT  SOCIAL HISTORY:   Significant other/partner:  []  Children:  [X]   Substance hx:  []   Tobacco hx:  []   Alcohol hx: []   Home Opioid hx:  [] I-Stop Reference No:  - no active Rx's / see chart note  Living Situation: [X]Home  []Long term care  []Rehab []Other  Muslim/Spiritual practice: ; Role of organized [employer] [ ] important [ ] some [ ] unable to assess  Coping: [] well [] with difficulty [] poor coping [] unable to assess  Support system: [X] strong [] adequate [] inadequate    ADVANCE DIRECTIVES:    []MOLST  []Living Will  DECISION MAKER(s):  [] Health Care Proxy(s)  [X] Surrogate(s)  [] Guardian           Name(s)/Phone Number(s):  Karena Hogan    BASELINE (I)ADLs (prior to admission):  [place]: []Total  [] Moderate [X]Dependent    ALLERGIES:  Aldactone (Rash)    MEDICATIONS  (STANDING):  aspirin  chewable 81 milliGRAM(s) Oral daily  atorvastatin 40 milliGRAM(s) Oral at bedtime  cefTRIAXone Injectable. 1000 milliGRAM(s) IV Push every 24 hours  heparin   Injectable 5000 Unit(s) SubCutaneous every 8 hours  lidocaine   4% Patch 1 Patch Transdermal every 24 hours  pantoprazole    Tablet 40 milliGRAM(s) Oral every 12 hours  sodium zirconium cyclosilicate 10 Gram(s) Oral every 8 hours  thiamine 100 milliGRAM(s) Oral every 24 hours    MEDICATIONS  (PRN):  HYDROmorphone  Injectable 0.5 milliGRAM(s) IV Push every 4 hours PRN Severe Pain (7 - 10)  HYDROmorphone  Injectable 0.2 milliGRAM(s) IV Push every 4 hours PRN Moderate Pain (4 - 6)      PRESENT SYMPTOMS/REVIEW OF SYSTEMS: []Unable to obtain due to poor mentation/encephalopathy  Source if other than patient:  []Family   []Team     Pain: [] yes [X] no  QOL Impact -   Location -                    Aggravating Factors -  Quality -  Radiation -  Timing -  Severity (0-10 scale) -   Minimal Acceptable Level (0-10 scale) -    CPOT Score:  (Pain Assessment Scale for Critically Ill)    PAIN AD Score:  (Nonverbal Pain Assessment Scale)    Dyspnea:                           []Mild  []Moderate []Severe  Anxiety:                             []Mild []Moderate []Severe  Fatigue:                             []Mild []Moderate []Severe  Nausea:                             []Mild []Moderate []Severe  Loss of Appetite:              []Mild []Moderate []Severe  Constipation:                    []Mild []Moderate []Severe    Other Symptoms:  [x]All Other Review Of Systems Negative     Palliative Performance Status Version 2:  20%  (Functional Assessment Tool)    PHYSICAL EXAM:  GENERAL:  []Alert  [X]Oriented x2-3   [X]Lethargic  []Cachexia  []Unarousable  [X]Verbal  []Non-Verbal  Behavioral:   []Anxiety  []Delirium []Agitation [X]Cooperative  HEENT:  [X]Normal   []Dry mouth   []ET Tube/Trach  []Oral lesions  PULMONARY:   [X]Clear []Tachypnea  []Audible excessive secretions  []Normal Work of Breathing []Labored Breathing  []Rhonchi []Crackles []Wheezing  CARDIOVASCULAR:    [X]Regular Rate & Rhythm []Irregular []Tachy  []Prabhakar  GASTROINTESTINAL:  [X]Soft  []Distended   []+BS  [X]Non tender []Tender  []PEG []OGT/ NGT  Last BM:  GENITOURINARY:  []Normal [] Incontinent   []Oliguria/Anuria   [X]Mackenzie  MUSCULOSKELETAL:   []Normal   []Weakness  [X]Bed/Wheelchair bound []Edema  NEUROLOGIC:   [X]No focal deficits  []Cognitive impairment  []Dysphagia []Dysarthria []Paresis []Encephalopathic  SKIN:   [X]Normal   []Pressure ulcer(s)  []Rash    CRITICAL CARE:  []Shock Present  []Septic []Cardiogenic []Neurologic []Hypovolemic  []Vasopressors []Inotropes   []Respiratory failure present []Mechanical Ventilation []Non-invasive ventilatory support []High-Flow  []Acute  []Chronic []Hypoxic  []Hypercarbic  []Other organ failure    Vital Signs Last 24 Hrs  T(C): 36.2 (06 Jun 2024 09:28), Max: 36.4 (05 Jun 2024 14:37)  T(F): 97.2 (06 Jun 2024 09:28), Max: 97.5 (05 Jun 2024 14:37)  HR: 66 (06 Jun 2024 10:35) (66 - 90)  BP: 97/50 (06 Jun 2024 10:35) (93/50 - 120/73)  BP(mean): 70 (06 Jun 2024 10:35) (68 - 90)  RR: 18 (06 Jun 2024 10:35) (16 - 20)  SpO2: 100% (06 Jun 2024 10:35) (99% - 100%)    Parameters below as of 06 Jun 2024 10:35  Patient On (Oxygen Delivery Method): nasal cannula  O2 Flow (L/min): 2      LABS:                        8.4    5.21  )-----------( 270      ( 06 Jun 2024 12:37 )             25.1   06-06    129<L>  |  96  |  85<H>  ----------------------------<  76  5.0   |  21<L>  |  3.06<H>    Ca    8.8      06 Jun 2024 05:30  Phos  4.9     06-06  Mg     2.0     06-06    TPro  5.8<L>  /  Alb  3.0<L>  /  TBili  0.4  /  DBili  x   /  AST  18  /  ALT  23  /  AlkPhos  82  06-06    RADIOLOGY & ADDITIONAL STUDIES:  < from: TTE Echo Complete w/ Contrast w/ Doppler (06.05.24 @ 14:23) >   1. Severely reduced left ventricular systolic function, ejection   fraction    15-20%.   2. Global left ventricular hypokinesis.   3. With echocontrast imaging, flow stasis is noted at the apex. Early   thrombus formation cannot be excluded. Recommend interval imaging.   4. Dilated left ventriclular size.   5. Normal right ventricular size and systolic function.   6. Biatrial enlargement.   7. Aortic sclerosis without significant stenosis.   8. Mild aortic regurgitation.   9. Moderate mitral regurgitation.  10. Moderate-to-severe tricuspid regurgitation.  11. Pulmonary hypertension present, pulmonary artery systolic pressure is   35 mmHg, assuming a right atrial pressure of 8 mmHg.  12. Trivial pericardial effusion.  13. No prior echo is available for comparison.    < end of copied text >      REFERRALS:  [x]Social Work  [X]Case management []PT/OT []Chaplaincy  []Hospice  []Patient/Family Support []Massage Therapy []Music Therapy    DISCUSSION OF CASE: Family - obtained additional history and to provide emotional support;  Primary team - discussed plan of care

## 2024-06-06 NOTE — PROGRESS NOTE ADULT - PROBLEM SELECTOR PLAN 3
Patient has chronic back pain and a spinal stimulator. Now complaining of significant back pain on admission, appears musculoskeletal in nature. Concerning for compression fracture vs muscle spasm vs malignancy vs kidney stone     - Adjustment of the spinal stimulator,  performed yesterday by a technician who always perform this task of the patient  - Lidocaine patch  - Tylenol  - Avoid NSAIDS  - 0.2 and 0.5 prn IV dilaudid for severe pain

## 2024-06-06 NOTE — PROGRESS NOTE ADULT - PROBLEM SELECTOR PLAN 1
#HF exacerbation  Admitted with proBNP >70K and prominent cardiomegaly on CXR and CTAP. Patient with chronic HFrEF (baseline is EF of 15% per daughter), unclear etiology, possible CAD. Follows with an [facility] Cardiologist, Dr. Murray Dillard. Pt has significant JVD on admission , no LE edema, no crackles.  Home med is Only on toprol 50 XL. Not on any other GDMT (was on Entresto, however did not tolerate it per daughter, likely from renal failure?) Was also previously on metolazone and lasix but stopped by cardiologist. s/p Lasix 40 IVP in ED. Current TTE EF 15- 20. PHTN, normal right ventricular size and function.     - Can give IV Lasix 80mg BID as needed   - strict I's & O's  - daily weightsE  - appreciate Cardiology recommendations  - holding home Toprol (avoiding negative inotropes at this time)

## 2024-06-06 NOTE — CONSULT NOTE ADULT - ASSESSMENT
89 yo F with chronic lower back pain c/b left foot drop (2/2 herniated disc repair sx in 2010, spinal cord stimulator), hypertension, breast ca (2014, s/p R breast lumpectomy + radiation but no chemo), Afib (not on AC), HFrEF (baseline EF is 15%) CKD (baseline cr 1.3-1.5), chronic anemia (baseline hgb 8), obstructive uropathy with recently placed mackenzie 2 weeks ago at [facility] and several recent hospitalizations at [facility] in May for hyponatremia and urinary retention who presents today with worsening back pain, lethargy, confusion, nausea, and diarrhea, found to have uremia and acute on chronic renal failure and hyperkalemia.  Palliative consulted for help with complex medical decision making.

## 2024-06-06 NOTE — CONSULT NOTE ADULT - TIME BILLING
moderate complexity
As above
Emotional Support/Supportive Care and Clarification of Potential Disease Trajectory related to renal failure and heart failure.  Exploration of GOC including advanced directives such as HCP designation and code status.

## 2024-06-06 NOTE — SWALLOW BEDSIDE ASSESSMENT ADULT - SWALLOW EVAL: DIAGNOSIS
INCOMPLETE Patient required verbal and tactile cues to maintain adequate wakefulness/alertness for oral intake. Mildly prolonged mastication with solids. No yfn clinical indicators of penetration/aspiration noted. No gross clinical indicators of pharyngeal inefficiency. Presentation c/w AMS. Anticipate reduced oral intake given current mentation. Strict aspiration precautions recommended. Discussed case with Dr. Lara and daughter- will resume oral intake as tolerated.

## 2024-06-06 NOTE — PROGRESS NOTE ADULT - PROBLEM SELECTOR PLAN 9
Resolved   Likely from uremia, cannot r/o infectious etiology which may explain her abdominal pain and low grade rectal temp  - PRN zofran

## 2024-06-06 NOTE — CONSULT NOTE ADULT - PROBLEM SELECTOR RECOMMENDATION 4
See GOC above.  - DNR/DNI  - Surrogate: Karena (daughter)  - Home hospice eligible if family agreeable to a symptom-directed approach to care. Will readdress tomorrow with daughter.

## 2024-06-06 NOTE — PROGRESS NOTE ADULT - PROBLEM SELECTOR PLAN 2
Admitted with K 6.7. Hyperkalemia iso acute renal failure as well as chronic obstructive uropathy.    - CW  lokelma 10 TID with hold parameters  - Trend EKGs  - Duonebs as needed  - Can give 50-100meq bicarb push and repeat as needed

## 2024-06-06 NOTE — PROGRESS NOTE ADULT - PROBLEM SELECTOR PLAN 10
Palliative was consulted, given the multiple comorbidities and the need for a plan in case the patient condition deteriorates further. Improving/Largely resolved. Now AOx2-3.  AMS likely iso renal failure with uremia and electrolyte abnormalities. Infectious etiology possible although no leukocytosis, UA w/o nitrite, RVP negative, and Chest imaging w/o infiltrates. No report of fall or head trauma.   - tx hyperkalemia and renal failure as below  - f/u B12  - f/u TSH  - f/u RPR  - f/u Vitamin B1  - PT/OT  - nutrition consult f/u recs

## 2024-06-06 NOTE — SWALLOW BEDSIDE ASSESSMENT ADULT - SWALLOW EVAL: RECOMMENDED FEEDING/EATING TECHNIQUES
crush medication (when feasible)/maintain upright posture during/after eating for 30 mins/oral hygiene/position upright (90 degrees)/small sips/bites medications with puree/pudding/crush medication (when feasible)/maintain upright posture during/after eating for 30 mins/oral hygiene/position upright (90 degrees)/small sips/bites

## 2024-06-06 NOTE — CONSULT NOTE ADULT - CONVERSATION DETAILS
Pt too lethargic to participate in discussion but daughter confirmed that patient wanted to be DNR and DNI. We discussed her medical condition and her recent decline over the past month. Prior to May, she was living at home mostly independent, going to family functions. Since then, she has been hospitalized 3 times and had a decline in her cognition and overall health. We spoke about how her organ dysfunction is all inter-related, cardiac and renal, and as a result it is difficult to treat any of it. That she may not recover because of the limited options available for treatment. Karena understood and said that if this is it for her mother, she would like to bring her home to pass. However, we agreed to see how the patient does overnight and then readdress. If no better tomorrow, Karena agreed to discuss whether a symptom-directed approach to care would be appropriate.

## 2024-06-06 NOTE — PHYSICAL THERAPY INITIAL EVALUATION ADULT - NSPTDISCHREC_GEN_A_CORE
with prior level of assistance, discussed with pt's daughter at bedside who is pt's primary caregiver/Home PT

## 2024-06-06 NOTE — SWALLOW BEDSIDE ASSESSMENT ADULT - PHARYNGEAL PHASE
Seemingly age appropriate timely swallow, hyolaryngeal movement appreciated, with no clinical indicators of penetration/aspiration noted across PO. No gross clinical indicators of pharyngeal inefficiency.

## 2024-06-06 NOTE — SWALLOW BEDSIDE ASSESSMENT ADULT - SLP PERTINENT HISTORY OF CURRENT PROBLEM
history of chronic lower back pain c/b left foot drop (2/2 herniated disc repair sx in 2010), now with spinal cord stimulator, hypertension, breast ca (2014) s/p R breast lumpectomy + radiation but no chemo), Afib, HFrEF, CKD, chronic anemia, obstructive uropathy with recently placed mackenzie 2 weeks ago at [facility], and several recent hospitalizations at [facility] in May for hyponatremia and urinary retention. Patient presented to [facility] on 6/5/24 with worsening back pain, lethargy, confusion, nausea, and diarrhea. Patent was found to have uremia and acute on chronic renal failure and hyperkalemia.

## 2024-06-06 NOTE — PROGRESS NOTE ADULT - PROBLEM SELECTOR PLAN 7
Hgb stable since discharge from [facility] on 5/28. No evidence of bleeding although daughter states she has had some rectal bleeding recently on/off and was supposed to get a colonoscopy but deferred due to cardiac issues.  Baseline Hgb ~8.  - S/P 1 unit pRBC 6/6 of Hb 6.9   - repeat Iron studies, B12, folate, retic, LDH, Hapto (hemolyzed)  - Active Type and screen  - Transfuse for Hgb <7, consider <8 given her CAD and elevated troponin on presentation Upon admission, elevated troponin T to 133 -> downtrended to 127. Lack of EKG changes on serial repeats. No chest pain at this time.   Multiple reasons to have demand/T2MI namely her CKD, poor perfusion from her HFrEF (EF 15%), as well as anemia with baseline hgb to ~8.  - CTM at this time  - treat CKD/HFrEF as above  - monitor perfusion status  - maintain MAP > 65 as able

## 2024-06-06 NOTE — PROGRESS NOTE ADULT - PROBLEM SELECTOR PLAN 5
Improving. Now AOx2-3.  AMS likely iso renal failure with uremia and electrolyte abnormalities. Infectious etiology possible although no leukocytosis, UA w/o nitrite, RVP negative, and Chest imaging w/o infiltrates. No report of fall or head trauma.   - tx hyperkalemia and renal failure as below  - f/u B12  - f/u TSH  - f/u RPR  - f/u Vitamin B1  - PT/OT  - nutrition consult f/u recs Hgb stable since discharge from [facility] on 5/28. No evidence of bleeding although daughter states she has had some rectal bleeding recently on/off and was supposed to get a colonoscopy but deferred due to cardiac issues.  Baseline Hgb ~8.  - S/P 1 unit pRBC 6/6 of Hb 6.9   - repeat Iron studies, B12, folate, retic, LDH, Hapto (hemolyzed)  - Active Type and screen  - Transfuse for Hgb <7, consider <8 given her CAD and elevated troponin on presentation

## 2024-06-06 NOTE — PROGRESS NOTE ADULT - PROBLEM SELECTOR PLAN 8
Daughter states this is chronic. Patient is not on AC for unclear reasons. CHADSVASC of 6. She had rectal bleeding at outside hospital several weeks ago and scope was deferred due to her cardiac condition. Rectal bleeding has stopped per daughter and hgb is stable since discharge and her fecal occult was negative.   - consider initiating full AC, will defer to cardiology, likely hold at this time given frequent bleeds/low baseline Hgb  - had been on Eliquis in the past but was stopped approx Dec '23 given on and off rectal bleed  - Hold off on Toprol given concern for low flow state and marginal pressures and patient is currently rate controlled

## 2024-06-06 NOTE — CONSULT NOTE ADULT - REASON FOR ADMISSION
acute on chronic renal failure

## 2024-06-06 NOTE — PROGRESS NOTE ADULT - PROBLEM SELECTOR PLAN 12
F: s/p in ED, cautious with fluids in HF exacerbation  E: cautious in K repletion in ARF  N: Renal diet  DVT Ppx: Hep subq    Code Status: DNR/DNI
F: s/p in ED, cautious with fluids in HF exacerbation  E: cautious in K repletion in ARF  N: Renal diet  DVT Ppx: Hep subq    Code Status: DNR/DNI

## 2024-06-06 NOTE — PHYSICAL THERAPY INITIAL EVALUATION ADULT - ADDITIONAL COMMENTS
Pt. resides in an elevator building, she has WC for appointments, rollator for short distance ambulation up until 04/24, shower chair/grab bars; commode.   Pt.'s daughter has been staying with pt overnight, pt has 10 hrs x7 days of HHA assistance.

## 2024-06-06 NOTE — CONSULT NOTE ADULT - PROBLEM SELECTOR RECOMMENDATION 3
PPSV 20%. At baseline, was able to only walk a few steps to the bathroom and only with an assistive device  - Supportive care

## 2024-06-06 NOTE — PROGRESS NOTE ADULT - PROBLEM SELECTOR PLAN 3
#History of obstructive Uropathy  #Non-oliguric TODD on presumed CKD3   Suspect worsening renal failure is a combination of low flow cardiac state and GI losses and poor PO intake, in addition Feurea consistent with pre-renal etiology, likely 2/2 poor renal perfusion in setting of HF and venous congestion. No evidence of hydronephrosis on imaging.   BUN/Cr largely lateral. However, mentation has improved as per below.   - c lokelma 10 TID with hold parameters  - Repeat BMP bid  - Lasix 80mg BID as tolerated per renal however hold at this time given slightly dry on exam   - Continue with mackenzie catheter at this time  - f/u cortisol AM  - Renal diet  - Avoid nephrotoxic agents #ATN  #History of obstructive Uropathy  #Non-oliguric TODD on presumed CKD3   Suspect worsening renal failure is a combination of low flow cardiac state and GI losses and poor PO intake, in addition Feurea consistent with pre-renal etiology, likely 2/2 poor renal perfusion in setting of HF and venous congestion. No evidence of hydronephrosis on imaging.   BUN/Cr largely lateral. However, mentation has improved as per below.   - c lokelma 10 TID with hold parameters  - Repeat BMP bid  - Lasix 80mg BID as tolerated per renal however hold at this time given slightly dry on exam   - Continue with mackenzie catheter at this time, consider trial of void 6/7  - f/u cortisol AM  - Renal diet  - Avoid nephrotoxic agents

## 2024-06-06 NOTE — PHYSICAL THERAPY INITIAL EVALUATION ADULT - PERTINENT HX OF CURRENT PROBLEM, REHAB EVAL
Pt. is a 89 y.o female ambulatory untill 04/24, still participating in transfers prior to current admission, with h/o chronic back pain s/p spinal stimulator, L foot drop, Afib, breast CA s/p R lumpectomy, HF with EF 15%, recent hospitalization for urinary retention and now s/p chronic Zepeda, presenting with ASMS, increased lethargy, nausea, diarrhea. Admitted for management of hyperkalemia and acute on chronic renal failure

## 2024-06-06 NOTE — PROGRESS NOTE ADULT - ATTENDING COMMENTS
BP remains stable and nerve stimulator adjusted with some improvement. No change in renal function and increase UO noted overnight post Lasix. Continue treatment for UTi and f/u Cx. Cardiology concerned about further diuresis and end organ hypoperfusion. Pts daughter very involved and agree with continued goals of care discussions ie regarding HD should renal function decline further. BP remains stable and nerve stimulator adjusted with some improvement. No change in renal function and increase UO noted overnight post Lasix. Continue treatment for UTi and f/u Cx. Cardiology concerned about further diuresis and end organ hypoperfusion. Pts daughter very involved and agree with continued goals of care discussions ie regarding HD should renal function decline further. Palliative care to see.

## 2024-06-06 NOTE — PROGRESS NOTE ADULT - ASSESSMENT
89-year-old female history of chronic lower back pain c/b left foot drop (2/2 herniated disc repair sx in 2010), now with spinal cord stimulator, HTN, breast ca (2014, s/p R breast lumpectomy + radiation but no chemo), Afib (not on AC), HFrEF (baseline EF is 15%) CKD (baseline cr 1.3-1.5), chronic anemia (baseline hgb 8), obstructive uropathy with recently placed mackenzie 2 weeks ago at [facility] and several recent hospitalizations at [facility] in May for hyponatremia and urinary retention who presented w/ worsening back pain, lethargy, confusion, nausea, and diarrhea, found to have uremia and acute on chronic renal failure and hyperkalemia.

## 2024-06-06 NOTE — CONSULT NOTE ADULT - CONSULT REASON
HFrEF
TODD, hyponatremia, hyperkalemia
Complex medical decision making
Hyperkalemia, Renal Failure
PM&R evaluation

## 2024-06-06 NOTE — PROGRESS NOTE ADULT - ASSESSMENT
89 yo F w/ PMH of ?CKD3-4 (recent outpatient sCr 2.56 however reportedly had sCr 1.5 at discharge from [facility] approx 1 week ago), s/p mackenzie for obstructive uropathy, HTN, HLD, HFrEF, afib, foot drop s/p spinal cord stimulator, breast ca s/p lumpectomy/RT, presenting with worsening back pain, lethargy, confusion, nausea, and diarrhea. Consulted for TODD with electrolyte disturbance, initial assessment revealed poor EF and urine studies consistent with pre-renal/perfusion-related etiology. Started IV diuresis, sCr slightly uptrending today.    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 Less likely obstructive uropathy with mackenzie in place and no evidence of hydronephrosis on scan.  NAGMA secondary to above  - Given congestion and HF, recommend continuing with diuresis. Can give IV Lasix 80mg BID as BP tolerates. Expect blood transfusion will also improve renal perfusion.  - If patient does not improve with diuresis, can consider inotrope support  - BMP BID for now  - Check AM cortisol  - Can give lokelma 10g daily until K <5  - Would avoid giving additional fluids at this time  - Renal diet  - Strict I&O  - Avoid nephrotoxic agents  - Maintain SBP >100 for renal perfusion  - Dose abx for current egfr    Hyponatremia - normal serum osm, suspect related to azotemia. Improving now with diuresis.  - Avoid hypotonic fluids  - Fluid restriction <1L/day  - Cont diuresis

## 2024-06-06 NOTE — PROGRESS NOTE ADULT - PROBLEM SELECTOR PLAN 6
Not meeting SIRS criteria in the ED, Had mackenzie placed 2 wks ago at [facility] for obstructive uropathy, pt was discharged to [facility] with mackenzie, which was exchanged in ED. Initial UA sent had mod LE. Prior urine culture recently at [facility] growing Pansensitive E Coli.   s/p dose of cefepime in ED, monitor off antibiotics unless decompensate  May also be related to retaining, per [facility] this was related to her large stool burden at that time.   - repeat UA w/ Cx  - f/u blood cultures   - Start CTX for now, can change based on Cx results. Previously discussed ertapenem but will hold off until culture results per ID Chronic lower back pain c/b left foot drop (2/2 herniated disc repair sx in 2010), now with spinal cord stimulator.  More bed bound as of May 2024.   - nutrition consult, f/u their recs  - evaluate and treat pain as needed  - reduce pill burden as able  - PT consult f/u recs  - palliative care consult, GOC conversations, f/u recs

## 2024-06-06 NOTE — SWALLOW BEDSIDE ASSESSMENT ADULT - COMMENTS
Per discussion with daughter (at bedside), patient consumes soft foods/soups at home. Does not like greens.

## 2024-06-06 NOTE — CONSULT NOTE ADULT - PROBLEM SELECTOR RECOMMENDATION 9
Echo showing EF 15-20%, now likely causing renal failure on top of patient's CKD.  - Patient's level of heart failure would make her eligible for home hospice if family agreeable. Will follow-up tomorrow to discuss further

## 2024-06-06 NOTE — PROGRESS NOTE ADULT - PROBLEM SELECTOR PLAN 7
Not meeting SIRS criteria in the ED, Had mackenzie placed 2 wks ago at [facility] for obstructive uropathy, pt was discharged to [facility] with mackenzie, which was exchanged in ED. Initial UA sent had mod LE. Prior urine culture recently at [facility] growing Pansensitive E Coli.   s/p dose of cefepime in ED, monitor off antibiotics unless decompensates      - repeat UA w/ Cx  - f/u blood cultures   - Start CTX for now, can change based on Cx results

## 2024-06-06 NOTE — SWALLOW BEDSIDE ASSESSMENT ADULT - SLP GENERAL OBSERVATIONS
Patient was seen fully awake and alert, HOB fully elevated, on 2L of O2 via NC. Patient inconsistently followed simple directives, though seemingly associated with mentation. Patient verbalized when prompted. Fluent and intelligible speech. Mild rough and high pitch vocal quality noted. Daughter at bedside.

## 2024-06-06 NOTE — PROGRESS NOTE ADULT - PROBLEM SELECTOR PLAN 4
Pt presenting with Na of 127 on admission. The patient has been previously admitted to [facility] last month for the management of hyponatremia as well. Given the history and finding, likely a combination of tea and toast, heart failure as well as SIADH form the severe back pain.  Currently at baseline sodium per patient family.   - CW diuresis with Lasix as above   - No more fluids for now given the patient is volume overloaded and HF   - Trend BMP  - FU renal recs Pt presenting with Na of 127 on admission. The patient has been previously admitted to [facility] last month for the management of hyponatremia as well. Given the history and finding, likely a combination of tea and toast, heart failure as well as SIADH form the severe back pain.  Currently at baseline sodium per patient family.   - CW diuresis with Lasix as above   - No more fluids for now given the patient is volume overloaded and HF   - Trend BMP  - FU renal recs    #Hyperkalemia  Improving.   Admitted with K 6.7. Hyperkalemia iso acute renal failure as well as chronic obstructive uropathy.  Now improving, last K 5.0.  - C lokelma 10 TID with hold parameters  - Trend EKGs  - Can give 50-100meq bicarb push and repeat as needed, hold at this time

## 2024-06-06 NOTE — PROGRESS NOTE ADULT - PROBLEM SELECTOR PLAN 1
#HF exacerbation  Admitted with proBNP >70K and prominent cardiomegaly on CXR and CTAP. Patient with chronic HFrEF (baseline is EF of 15% per daughter), possible iso her CAD. Follows with an [facility] Cardiologist, Dr. Murray Dillard. Pt has JVD on admission however improved, no LE edema, no crackles.    Home med is only toprol 50 XL, not on any other GDMT (was on Entresto, however did not tolerate it per daughter, likely from renal failure) Was also previously on metolazone and lasix but stopped by cardiologist.    Current TTE EF 15- 20%, PHTN, normal right ventricular size and function.   - lasix IV as needed, at this time hold given trace edema b/l and appearing dry on exam and could be contributing to poor kidney perfusion   - strict I's & O's  - daily weights  - appreciate Cardiology recommendations  - holding home Toprol (avoiding negative inotropes at this time)

## 2024-06-06 NOTE — PROGRESS NOTE ADULT - PROBLEM SELECTOR PLAN 2
Improved.   Patient has chronic back pain and a spinal stimulator. Significant back pain on admission, appears musculoskeletal in nature.   On CT: spinal Degenerative changes. Spinal stimulator lead entrance thecal sac at T12-L1. Posterior fusion L4-L5.  Etiology likely chronic back pain, muscle spasm less likely at this time given localized to the spine, paravertebrals not tight on exam. Compression fracture less likely given lack of imaging findings/improvement s/p device manipulation.  - s/p Adjustment of the spinal stimulator,  performed 6/5 by a technician who always perform this task of the patient  - c Lidocaine patch  - c Tylenol  - Avoid NSAIDS given bleed history   - 0.2 and 0.5 prn IV Dilaudid for severe pain Improved.   Patient has chronic back pain and a spinal stimulator. Significant back pain on admission, appears musculoskeletal in nature.   On CT: spinal Degenerative changes. Spinal stimulator lead entrance thecal sac at T12-L1. Posterior fusion L4-L5.  Etiology likely chronic back pain, muscle spasm less likely at this time given localized to the spine, paravertebrals not tight on exam. Compression fracture less likely given lack of imaging findings/improvement s/p device manipulation.  - s/p Adjustment of the spinal stimulator,  performed 6/5 by a technician who always perform this task of the patient  - c Lidocaine patch  - c Tylenol  - Avoid NSAIDS given bleed history   - stop the 0.2 and 0.5 prn IV Dilaudid for severe pain at this time, can do PRN per patient pain  - bowel reg

## 2024-06-06 NOTE — PROGRESS NOTE ADULT - PROBLEM SELECTOR PLAN 4
#History of obstructive Uropathy  #Hyperkalemia   Suspect worsening renal failure is a combination of low flow cardiac state and GI losses and poor PO intake     - Start lokelma 10 TID with hold parameters  - Repeat BMP q 4 hours  - Urine studies   - Continue with mackenzie catheter   - I/Os  - Renal diet  - Avoid nephrotoxic agents 24-Sep-2023 12:40

## 2024-06-06 NOTE — PROGRESS NOTE ADULT - PROBLEM SELECTOR PLAN 9
Improving.   Admitted with K 6.7. Hyperkalemia iso acute renal failure as well as chronic obstructive uropathy.  Now improving, last K 5.0.  - C lokelma 10 TID with hold parameters  - Trend EKGs  - Can give 50-100meq bicarb push and repeat as needed, hold at this time Not meeting SIRS criteria in the ED, Had mackenzie placed 2 wks ago at [facility] for obstructive uropathy, pt was discharged to [facility] with mackenzie, which was exchanged in ED. Initial UA sent had mod LE. Prior urine culture recently at [facility] growing Pansensitive E Coli.   s/p dose of cefepime in ED, monitor off antibiotics unless decompensate  May also be related to retaining, per [facility] this was related to her large stool burden at that time.   - repeat UA w/ Cx  - f/u blood cultures   - c CTX for now, can change based on Cx results. Previously discussed ertapenem but will hold off until culture results per ID

## 2024-06-06 NOTE — PROGRESS NOTE ADULT - SUBJECTIVE AND OBJECTIVE BOX
Nephrology progress note    Seen at bedside. Resting comfortably in bed. Noted worsening back pain overnight, awaiting control for spinal stimulator. Inadequate response to Lasix 40 yesterday, UOP improved after 80 x1 for total of ~1L UOP. Hgb <7 today, receiving 1u PRBC.    Allergies:  Aldactone (Rash)    Hospital Medications:   MEDICATIONS  (STANDING):  aspirin  chewable 81 milliGRAM(s) Oral daily  atorvastatin 40 milliGRAM(s) Oral at bedtime  cefTRIAXone Injectable. 1000 milliGRAM(s) IV Push every 24 hours  heparin   Injectable 5000 Unit(s) SubCutaneous every 8 hours  lidocaine   4% Patch 1 Patch Transdermal every 24 hours  pantoprazole    Tablet 40 milliGRAM(s) Oral every 12 hours  sodium zirconium cyclosilicate 10 Gram(s) Oral every 8 hours  thiamine 100 milliGRAM(s) Oral every 24 hours    REVIEW OF SYSTEMS:  unable to obtain    VITALS:  T(F): 97.2 (06-06-24 @ 09:28), Max: 97.5 (06-05-24 @ 14:37)  HR: 66 (06-06-24 @ 10:35)  BP: 97/50 (06-06-24 @ 10:35)  RR: 18 (06-06-24 @ 10:35)  SpO2: 100% (06-06-24 @ 10:35)  Wt(kg): --    06-04 @ 07:01  -  06-05 @ 07:00  --------------------------------------------------------  IN: 280 mL / OUT: 100 mL / NET: 180 mL    06-05 @ 07:01  -  06-06 @ 07:00  --------------------------------------------------------  IN: 765 mL / OUT: 920 mL / NET: -155 mL        Weight (kg): 63.3 (06-05 @ 22:22)    PHYSICAL EXAM:  Constitutional: Lethargic, no acute distress, lying in bed  HEENT: NCAT, neck supple  Respiratory: CTAB, no w/r/r  Cardiac: Regular rate, no murmur  Gastrointestinal: abdomen soft, NT/ND  Genitourinary: mackenzie in place  Extremities: cool extremities, no peripheral edema  Dermatologic: no rashes on exposed skin  Neurologic: Lethargic, grimaces during exam    LABS:  06-06    129<L>  |  96  |  85<H>  ----------------------------<  76  5.0   |  21<L>  |  3.06<H>    Ca    8.8      06 Jun 2024 05:30  Phos  4.9     06-06  Mg     2.0     06-06    TPro  5.8<L>  /  Alb  3.0<L>  /  TBili  0.4  /  DBili      /  AST  18  /  ALT  23  /  AlkPhos  82  06-06                          6.9    4.89  )-----------( 243      ( 06 Jun 2024 05:30 )             20.8       Urine Studies:  Creatinine Trend: 3.06<--, 2.98<--, 2.93<--, 2.99<--, 2.79<--, 2.63<--  Urinalysis Basic - ( 06 Jun 2024 05:30 )    Color:  / Appearance:  / SG:  / pH:   Gluc: 76 mg/dL / Ketone:   / Bili:  / Urobili:    Blood:  / Protein:  / Nitrite:    Leuk Esterase:  / RBC:  / WBC    Sq Epi:  / Non Sq Epi:  / Bacteria:       Sodium, Random Urine: 24 mmol/L (06-05 @ 13:33)  Osmolality, Random Urine: 299 mosm/kg (06-05 @ 13:33)  Sodium, Random Urine: 38 mmol/L (06-05 @ 03:34)  Creatinine, Random Urine: 62 mg/dL (06-05 @ 03:34)  Protein/Creatinine Ratio Calculation: 1.6 Ratio (06-05 @ 03:34)  Osmolality, Random Urine: 301 mosm/kg (06-05 @ 03:34)  Potassium, Random Urine: 58 mmol/L (06-05 @ 03:34)    RADIOLOGY & ADDITIONAL STUDIES:  reviewed

## 2024-06-06 NOTE — PHYSICAL THERAPY INITIAL EVALUATION ADULT - RANGE OF MOTION EXAMINATION, REHAB EVAL
AAROM vs PROM/bilateral upper extremity ROM was WFL (within functional limits)/bilateral lower extremity ROM was WFL (within functional limits)

## 2024-06-06 NOTE — PROGRESS NOTE ADULT - ATTENDING COMMENTS
I agree with the fellow's findings and plans as written above with the following additions/amendments:    Seen and examined at bedside. Still fluid overload on exam, continuing to trial diuresis. Hyponatremia 2/2 TODD, fluid overload. Further recs as above, discussed with primary team.

## 2024-06-06 NOTE — PROGRESS NOTE ADULT - ASSESSMENT
89-year-old female history of chronic lower back pain c/b left foot drop (2/2 herniated disc repair sx in 2010), now with spinal cord stimulator, hypertension, breast ca (2014, s/p R breast lumpectomy + radiation but no chemo), Afib (not on AC), HFrEF (baseline EF is 15%) CKD (baseline cr 1.3-1.5), chronic anemia (baseline hgb 8), obstructive uropathy with recently placed mackenzie 2 weeks ago at [facility] and several recent hospitalizations at [facility] in May for hyponatremia and urinary retention who presents today with worsening back pain, lethargy, confusion, nausea, and diarrhea, found to have uremia and acute on chronic renal failure and hyperkalemia.

## 2024-06-06 NOTE — SWALLOW BEDSIDE ASSESSMENT ADULT - ORAL PHASE
Adequate oral containment, mildly prolonged mastication with solids, seemingly functional bolus manipulation, and functional oral clearance.

## 2024-06-06 NOTE — CONSULT NOTE ADULT - PROBLEM SELECTOR RECOMMENDATION 2
Pt with underlying CKD, now with worsening renal function and complications including hyperkalemia, likely 2/2 acute on chronic CHF.  - Nephrology following, recs appreciated

## 2024-06-06 NOTE — PROGRESS NOTE ADULT - SUBJECTIVE AND OBJECTIVE BOX
*** STEPDOWN 7LACH --->>> RMF ***    HOSPITAL COURSE  89-year-old female history of chronic lower back pain c/b left foot drop (2/2 herniated disc repair sx in 2010), now with spinal cord stimulator, hypertension, breast ca (2014, s/p R breast lumpectomy + radiation but no chemo), Afib (not on AC), HFrEF (baseline EF is 15%) CKD (baseline cr 1.3-1.5), chronic anemia (baseline hgb 8), obstructive uropathy with recently placed mackenzie 2 weeks ago at [facility] and several recent hospitalizations at [facility] in May for hyponatremia, UTI (  pan sensitive E Coli treated with 5 day course of likely CTX) and urinary retention who presents today with worsening back pain, lethargy, confusion, nausea, and diarrhea, found to have uremia and acute on chronic renal failure, worsening HF and hyperkalemia.     Patient at baseline is reportedly AOx4 and was ambulating with a walker until the beginning of May per Daughter. Daughter reports pt has been declining since then. . Patient had Mackenzie placed for obstructive uropathy at 2nd [facility] hospitalization and was treated for UTI. She was discharged on 5/28 with Cr of 1.5, K of 5.2, Na of 132 and Hgb of 8. Since then, per daughter, pt has been utterly bedbound. On admission, K 6.9,  the patient received Calcium gluconate, bicarb, albuterol, insulin, D5 and Lokelma for hyperkalemia. K now is at 5 The patient is still in Lokelma TID. EKG without concerns. Cr 2.68 with baseline 1.5 renal was consulted, recommended diuresis with Lasix to help unload the heart as well as the hyponatremia (now improving). Pro BNP >58386. TTE (2024 [facility]): LV EF 15% with paradoxical septal motion with severe hypokinesis, dilated RV with reduced function, severe LA dilation, severe MR, moderate to severe TR, moderate PH. Cardiology were consulted, the patient recently stopped home Entresto for the concern of renal failure in [facility]. She is also on Metoprolol 50mg which was held per card for maintaining adequate HR iso reduced . They also recommended continuing diuresis. Inotropic support and HD though might be indicated at some point, the patient is deemed not a candidate considering her advanced age. She received a unit of pRBC this morning for Hb of 6.9 to improve her overall cardiovascular status.     As per her severe back pain, the technician responsible for the spinal stimulator came yesterday to adjust the device. The patient pain now has improved. She was put on pain control regimen with Dilaudid as well. She is stable to transfer to [facility].     SUBJECTIVE:    - Patient seen and examined at bedside, comfortable, NAD. Denied fever, chest pain, dyspnea, abdominal pain.   - noting back pain but it is improved per patient and family bedside   - excited to see her grandson    Vital Signs Last 12 Hrs  T(F): 97.2 (06-06-24 @ 09:28), Max: 97.2 (06-06-24 @ 09:28)  HR: 74 (06-06-24 @ 13:15) (66 - 78)  BP: 100/64 (06-06-24 @ 13:15) (93/50 - 115/56)  BP(mean): 77 (06-06-24 @ 13:15) (68 - 80)  RR: 18 (06-06-24 @ 10:35) (18 - 18)  SpO2: 100% (06-06-24 @ 13:15) (99% - 100%) on 2L NC    I&O's Summary  05 Jun 2024 07:01  -  06 Jun 2024 07:00  --------------------------------------------------------  IN: 765 mL / OUT: 920 mL / NET: -155 mL    PHYSICAL EXAM:  Constitutional: NAD, slouched over slightly in bed  Neurological: AAOx2-3 (knows she is in [facility], thought [facility])  HEENT: PERRL, EOMI, sclera non-icteric, neck supple, +JVD, mildly dry MM  Respiratory: CTA b/l, good air entry b/l, no wheezing, no crackles/rales, without accessory muscle use and no intercostal retractions  Cardiovascular: irregular rhythm, normal S1S2, systolic murmur low grade loudest 2nd r upper intercostal, no gallops   Gastrointestinal: soft, tender to deep palpation, non distended, no masses or overt organomegaly palpable, BS normal  MSK: Tenderness over the lumbar spine  Extremities: feet without ulcers however hyperkeratosis present, trace pitting edema b/l LE below knee, muscle atrophy of distal right LE, LUE edematous. 3/5 strength to b/l lower legs limited due to back pain however.     LABS:                        8.4    5.21  )-----------( 270      ( 06 Jun 2024 12:37 )             25.1     06-06    127<L>  |  92<L>  |  83<H>  ----------------------------<  85  5.0   |  22  |  3.09<H>    Ca    9.1      06 Jun 2024 12:37  Phos  5.4     06-06  Mg     2.2     06-06    TPro  5.8<L>  /  Alb  3.0<L>  /  TBili  0.4  /  DBili  x   /  AST  18  /  ALT  23  /  AlkPhos  82  06-06    PT/INR - ( 05 Jun 2024 04:57 )   PT: 13.3 sec;   INR: 1.17          PTT - ( 05 Jun 2024 04:57 )  PTT:33.6 sec  Urinalysis Basic - ( 06 Jun 2024 12:37 )    Color: x / Appearance: x / SG: x / pH: x  Gluc: 85 mg/dL / Ketone: x  / Bili: x / Urobili: x   Blood: x / Protein: x / Nitrite: x   Leuk Esterase: x / RBC: x / WBC x   Sq Epi: x / Non Sq Epi: x / Bacteria: x    RADIOLOGY & ADDITIONAL TESTS:  - reviewed    MEDICATIONS  (STANDING):  aspirin  chewable 81 milliGRAM(s) Oral daily  atorvastatin 40 milliGRAM(s) Oral at bedtime  cefTRIAXone Injectable. 1000 milliGRAM(s) IV Push every 24 hours  heparin   Injectable 5000 Unit(s) SubCutaneous every 8 hours  lidocaine   4% Patch 1 Patch Transdermal every 24 hours  pantoprazole    Tablet 40 milliGRAM(s) Oral every 12 hours  sodium zirconium cyclosilicate 10 Gram(s) Oral every 8 hours  thiamine 100 milliGRAM(s) Oral every 24 hours    MEDICATIONS  (PRN):  HYDROmorphone  Injectable 0.5 milliGRAM(s) IV Push every 4 hours PRN Severe Pain (7 - 10)  HYDROmorphone  Injectable 0.2 milliGRAM(s) IV Push every 4 hours PRN Moderate Pain (4 - 6)

## 2024-06-07 ENCOUNTER — TRANSCRIPTION ENCOUNTER (OUTPATIENT)
Age: 89
End: 2024-06-07

## 2024-06-07 LAB
ANION GAP SERPL CALC-SCNC: 13 MMOL/L — SIGNIFICANT CHANGE UP (ref 5–17)
BASOPHILS # BLD AUTO: 0.04 K/UL — SIGNIFICANT CHANGE UP (ref 0–0.2)
BASOPHILS NFR BLD AUTO: 0.8 % — SIGNIFICANT CHANGE UP (ref 0–2)
BUN SERPL-MCNC: 78 MG/DL — HIGH (ref 7–23)
CALCIUM SERPL-MCNC: 8.7 MG/DL — SIGNIFICANT CHANGE UP (ref 8.4–10.5)
CHLORIDE SERPL-SCNC: 94 MMOL/L — LOW (ref 96–108)
CO2 SERPL-SCNC: 20 MMOL/L — LOW (ref 22–31)
CREAT SERPL-MCNC: 2.56 MG/DL — HIGH (ref 0.5–1.3)
EGFR: 17 ML/MIN/1.73M2 — LOW
EOSINOPHIL # BLD AUTO: 0.16 K/UL — SIGNIFICANT CHANGE UP (ref 0–0.5)
EOSINOPHIL NFR BLD AUTO: 3.3 % — SIGNIFICANT CHANGE UP (ref 0–6)
GLUCOSE BLDC GLUCOMTR-MCNC: 95 MG/DL — SIGNIFICANT CHANGE UP (ref 70–99)
GLUCOSE BLDC GLUCOMTR-MCNC: 98 MG/DL — SIGNIFICANT CHANGE UP (ref 70–99)
GLUCOSE SERPL-MCNC: 78 MG/DL — SIGNIFICANT CHANGE UP (ref 70–99)
HCT VFR BLD CALC: 24.2 % — LOW (ref 34.5–45)
HGB BLD-MCNC: 8 G/DL — LOW (ref 11.5–15.5)
IMM GRANULOCYTES NFR BLD AUTO: 0.6 % — SIGNIFICANT CHANGE UP (ref 0–0.9)
LYMPHOCYTES # BLD AUTO: 0.9 K/UL — LOW (ref 1–3.3)
LYMPHOCYTES # BLD AUTO: 18.6 % — SIGNIFICANT CHANGE UP (ref 13–44)
MAGNESIUM SERPL-MCNC: 2.2 MG/DL — SIGNIFICANT CHANGE UP (ref 1.6–2.6)
MCHC RBC-ENTMCNC: 28.4 PG — SIGNIFICANT CHANGE UP (ref 27–34)
MCHC RBC-ENTMCNC: 33.1 GM/DL — SIGNIFICANT CHANGE UP (ref 32–36)
MCV RBC AUTO: 85.8 FL — SIGNIFICANT CHANGE UP (ref 80–100)
MONOCYTES # BLD AUTO: 0.63 K/UL — SIGNIFICANT CHANGE UP (ref 0–0.9)
MONOCYTES NFR BLD AUTO: 13 % — SIGNIFICANT CHANGE UP (ref 2–14)
NEUTROPHILS # BLD AUTO: 3.08 K/UL — SIGNIFICANT CHANGE UP (ref 1.8–7.4)
NEUTROPHILS NFR BLD AUTO: 63.7 % — SIGNIFICANT CHANGE UP (ref 43–77)
NRBC # BLD: 0 /100 WBCS — SIGNIFICANT CHANGE UP (ref 0–0)
PHOSPHATE SERPL-MCNC: 5 MG/DL — HIGH (ref 2.5–4.5)
PLATELET # BLD AUTO: 274 K/UL — SIGNIFICANT CHANGE UP (ref 150–400)
POTASSIUM SERPL-MCNC: 4 MMOL/L — SIGNIFICANT CHANGE UP (ref 3.5–5.3)
POTASSIUM SERPL-SCNC: 4 MMOL/L — SIGNIFICANT CHANGE UP (ref 3.5–5.3)
RBC # BLD: 2.82 M/UL — LOW (ref 3.8–5.2)
RBC # FLD: 14.7 % — HIGH (ref 10.3–14.5)
SODIUM SERPL-SCNC: 127 MMOL/L — LOW (ref 135–145)
WBC # BLD: 4.84 K/UL — SIGNIFICANT CHANGE UP (ref 3.8–10.5)
WBC # FLD AUTO: 4.84 K/UL — SIGNIFICANT CHANGE UP (ref 3.8–10.5)

## 2024-06-07 PROCEDURE — 99232 SBSQ HOSP IP/OBS MODERATE 35: CPT

## 2024-06-07 PROCEDURE — 99233 SBSQ HOSP IP/OBS HIGH 50: CPT | Mod: GC

## 2024-06-07 RX ORDER — THIAMINE MONONITRATE (VIT B1) 100 MG
1 TABLET ORAL
Qty: 0 | Refills: 0 | DISCHARGE
Start: 2024-06-07

## 2024-06-07 RX ORDER — OXYCODONE HYDROCHLORIDE 5 MG/1
1 TABLET ORAL
Qty: 0 | Refills: 0 | DISCHARGE
Start: 2024-06-07

## 2024-06-07 RX ORDER — LIDOCAINE 4 G/100G
1 CREAM TOPICAL
Qty: 0 | Refills: 0 | DISCHARGE
Start: 2024-06-07

## 2024-06-07 RX ORDER — OXYCODONE HYDROCHLORIDE 5 MG/1
2.5 TABLET ORAL EVERY 6 HOURS
Refills: 0 | Status: DISCONTINUED | OUTPATIENT
Start: 2024-06-07 | End: 2024-06-08

## 2024-06-07 RX ORDER — METOPROLOL TARTRATE 50 MG
25 TABLET ORAL EVERY 24 HOURS
Refills: 0 | Status: DISCONTINUED | OUTPATIENT
Start: 2024-06-07 | End: 2024-06-08

## 2024-06-07 RX ORDER — LIDOCAINE 4 G/100G
1 CREAM TOPICAL DAILY
Refills: 0 | Status: DISCONTINUED | OUTPATIENT
Start: 2024-06-07 | End: 2024-06-08

## 2024-06-07 RX ORDER — SENNA PLUS 8.6 MG/1
2 TABLET ORAL
Qty: 0 | Refills: 0 | DISCHARGE
Start: 2024-06-07

## 2024-06-07 RX ORDER — OXYCODONE HYDROCHLORIDE 5 MG/1
2.5 TABLET ORAL
Qty: 0 | Refills: 0 | DISCHARGE
Start: 2024-06-07

## 2024-06-07 RX ORDER — FUROSEMIDE 40 MG
1 TABLET ORAL
Qty: 30 | Refills: 0
Start: 2024-06-07 | End: 2024-07-06

## 2024-06-07 RX ORDER — POLYETHYLENE GLYCOL 3350 17 G/17G
17 POWDER, FOR SOLUTION ORAL
Qty: 0 | Refills: 0 | DISCHARGE
Start: 2024-06-07

## 2024-06-07 RX ORDER — FUROSEMIDE 40 MG
80 TABLET ORAL ONCE
Refills: 0 | Status: COMPLETED | OUTPATIENT
Start: 2024-06-07 | End: 2024-06-07

## 2024-06-07 RX ORDER — OXYCODONE HYDROCHLORIDE 5 MG/1
5 TABLET ORAL EVERY 6 HOURS
Refills: 0 | Status: DISCONTINUED | OUTPATIENT
Start: 2024-06-07 | End: 2024-06-08

## 2024-06-07 RX ADMIN — LIDOCAINE 1 PATCH: 4 CREAM TOPICAL at 13:20

## 2024-06-07 RX ADMIN — LIDOCAINE 1 PATCH: 4 CREAM TOPICAL at 12:19

## 2024-06-07 RX ADMIN — LIDOCAINE 1 PATCH: 4 CREAM TOPICAL at 07:26

## 2024-06-07 RX ADMIN — CEFTRIAXONE 1000 MILLIGRAM(S): 500 INJECTION, POWDER, FOR SOLUTION INTRAMUSCULAR; INTRAVENOUS at 12:26

## 2024-06-07 RX ADMIN — HEPARIN SODIUM 5000 UNIT(S): 5000 INJECTION INTRAVENOUS; SUBCUTANEOUS at 22:10

## 2024-06-07 RX ADMIN — Medication 100 MILLIGRAM(S): at 08:44

## 2024-06-07 RX ADMIN — Medication 80 MILLIGRAM(S): at 15:29

## 2024-06-07 RX ADMIN — SODIUM ZIRCONIUM CYCLOSILICATE 10 GRAM(S): 10 POWDER, FOR SUSPENSION ORAL at 14:01

## 2024-06-07 RX ADMIN — SODIUM ZIRCONIUM CYCLOSILICATE 10 GRAM(S): 10 POWDER, FOR SUSPENSION ORAL at 07:26

## 2024-06-07 RX ADMIN — SODIUM ZIRCONIUM CYCLOSILICATE 10 GRAM(S): 10 POWDER, FOR SUSPENSION ORAL at 22:10

## 2024-06-07 RX ADMIN — OXYCODONE HYDROCHLORIDE 5 MILLIGRAM(S): 5 TABLET ORAL at 15:33

## 2024-06-07 RX ADMIN — OXYCODONE HYDROCHLORIDE 5 MILLIGRAM(S): 5 TABLET ORAL at 14:33

## 2024-06-07 RX ADMIN — HYDROMORPHONE HYDROCHLORIDE 0.5 MILLIGRAM(S): 2 INJECTION INTRAMUSCULAR; INTRAVENOUS; SUBCUTANEOUS at 06:17

## 2024-06-07 RX ADMIN — OXYCODONE HYDROCHLORIDE 5 MILLIGRAM(S): 5 TABLET ORAL at 23:20

## 2024-06-07 RX ADMIN — LIDOCAINE 1 PATCH: 4 CREAM TOPICAL at 19:14

## 2024-06-07 RX ADMIN — HEPARIN SODIUM 5000 UNIT(S): 5000 INJECTION INTRAVENOUS; SUBCUTANEOUS at 07:25

## 2024-06-07 RX ADMIN — ATORVASTATIN CALCIUM 40 MILLIGRAM(S): 80 TABLET, FILM COATED ORAL at 22:10

## 2024-06-07 RX ADMIN — OXYCODONE HYDROCHLORIDE 5 MILLIGRAM(S): 5 TABLET ORAL at 22:28

## 2024-06-07 RX ADMIN — PANTOPRAZOLE SODIUM 40 MILLIGRAM(S): 20 TABLET, DELAYED RELEASE ORAL at 17:42

## 2024-06-07 RX ADMIN — Medication 81 MILLIGRAM(S): at 12:26

## 2024-06-07 RX ADMIN — HEPARIN SODIUM 5000 UNIT(S): 5000 INJECTION INTRAVENOUS; SUBCUTANEOUS at 14:01

## 2024-06-07 RX ADMIN — PANTOPRAZOLE SODIUM 40 MILLIGRAM(S): 20 TABLET, DELAYED RELEASE ORAL at 07:25

## 2024-06-07 RX ADMIN — Medication 25 MILLIGRAM(S): at 14:59

## 2024-06-07 RX ADMIN — HYDROMORPHONE HYDROCHLORIDE 0.5 MILLIGRAM(S): 2 INJECTION INTRAMUSCULAR; INTRAVENOUS; SUBCUTANEOUS at 06:02

## 2024-06-07 NOTE — PROGRESS NOTE ADULT - PROBLEM SELECTOR PLAN 6
Chronic lower back pain c/b left foot drop (2/2 herniated disc repair sx in 2010), now with spinal cord stimulator.  More bed bound as of May 2024.   - nutrition consult, f/u their recs  - evaluate and treat pain as needed  - reduce pill burden as able  - PT consult f/u recs  - palliative care consult, GOC conversations, f/u recs

## 2024-06-07 NOTE — DISCHARGE NOTE PROVIDER - HOSPITAL COURSE
89-year-old female history of chronic lower back pain c/b left foot drop (2/2 herniated disc repair sx in 2010), now with spinal cord stimulator, hypertension, breast ca (2014, s/p R breast lumpectomy + radiation but no chemo), Afib (not on AC), HFrEF (baseline EF is 15%) CKD (baseline cr 1.3-1.5), chronic anemia (baseline hgb 8), obstructive uropathy with recently placed mackenzie 2 weeks ago at [facility] and several recent hospitalizations at [facility] in May for hyponatremia, UTI (  pan sensitive E Coli treated with 5 day course of likely CTX) and urinary retention who presents today with worsening back pain, lethargy, confusion, nausea, and diarrhea, found to have uremia and acute on chronic renal failure, worsening HF and hyperkalemia.   Patient at baseline is reportedly AOx4 and was ambulating with a walker until the beginning of May per Daughter. Daughter reports pt has been declining since then. . Patient had Mackenzie placed for obstructive uropathy at 2nd [facility] hospitalization and was treated for UTI. She was discharged on 5/28 with Cr of 1.5, K of 5.2, Na of 132 and Hgb of 8. Since then, per daughter, pt has been utterly bedbound. On admission, K 6.9,  the patient received Calcium gluconate, bicarb, albuterol, insulin, D5 and Lokelma for hyperkalemia. K now is at 5 The patient is still in Lokelma TID. EKG without concerns. Cr 2.68 with baseline 1.5 renal was consulted, recommended diuresis with Lasix to help unload the heart as well as the hyponatremia (now improving). Pro BNP >84442. TTE (2024 [facility]): LV EF 15% with paradoxical septal motion with severe hypokinesis, dilated RV with reduced function, severe LA dilation, severe MR, moderate to severe TR, moderate PH. Cardiology were consulted, the patient recently stopped home Entresto for the concern of renal failure in [facility]. She is also on Metoprolol 50mg which was held per card for maintaining adequate HR iso reduced . They also recommended continuing diuresis. Inotropic support and HD though might be indicated at some point, the patient is deemed not a candidate considering her advanced age. She received a unit of pRBC this morning for Hb of 6.9 to improve her overall cardiovascular status.   As per her severe back pain, the technician responsible for the spinal stimulator came yesterday to adjust the device. The patient pain now has improved. She was put on pain control regimen with Dilaudid as well. She is stable to transfer to [facility].     Problem List/Main Diagnoses:   #Acute on chronic HFrEF (heart failure with reduced ejection fraction).   #HF exacerbation  Admitted with proBNP >70K and prominent cardiomegaly on CXR and CTAP. Patient with chronic HFrEF (baseline is EF of 15% per daughter), possible iso her CAD. Follows with an [facility] Cardiologist, Dr. Murray Dillard. Pt has JVD on admission however improved.  Home med is only toprol 50 XL, not on any other GDMT (was on Entresto, however did not tolerate it). Was also previously on metolazone and lasix but stopped by cardiologist, due to inability to tolerate.   Current TTE EF 15- 20%, PHTN, normal right ventricular size and function.   - lasix IV as needed, c on lasix 40mg qd prn for leg swelling/sob  - resume home Toprol on discharge    #Acute exacerbation of chronic low back pain.   Patient has chronic back pain and a spinal stimulator. Appears musculoskeletal in nature.   On CT: spinal Degenerative changes. Spinal stimulator lead entrance thecal sac at T12-L1. Posterior fusion L4-L5.  Etiology likely chronic back pain, muscle spasm less likely at this time given localized to the spine, paravertebrals not tight on exam. Compression fracture less likely given lack of imaging findings/improvement s/p device manipulation.  - s/p Adjustment of the spinal stimulator, performed 6/5 by a technician who always perform this task of the patient  - c Lidocaine patch  - c Tylenol  - oral oxycodone PRN for pain, oxy 2.5mg q6 hr for moderate PRN, 5mg q6hr for severe PRN  - bowel reg    #Acute on chronic renal failure.   #ATN  #History of obstructive Uropathy  #Non-oliguric TODD on presumed CKD3   Suspect worsening renal failure is a combination of low flow cardiac state and GI losses and poor PO intake, in addition Feurea consistent with pre-renal etiology, likely 2/2 poor renal perfusion in setting of HF and venous congestion. No evidence of hydronephrosis on imaging.   BUN/Cr improving. Mentation has improved as per below.   - will go home on lasix 40 qd prn for sob/leg swelling  - Avoid nephrotoxic agents    #Chronic hyponatremia.   Pt presenting with Na of 127 on admission. The patient has been previously admitted to [facility] last month for the management of hyponatremia as well. Given the history and finding, likely a combination of tea and toast, heart failure as well as SIADH form the severe back pain.  Currently at baseline sodium per patient family.   - CW diuresis with Lasix as above     #Normocytic anemia.   Hgb stable since discharge from [facility] on 5/28. No evidence of bleeding although daughter states she has had some rectal bleeding recently on/off and was supposed to get a colonoscopy but deferred due to cardiac issues.  Baseline Hgb ~8. Found to be iron deficient (14)  - S/P 1 unit pRBC 6/6 of Hb 6.9   - consider iron supplement with PCP    #Functional quadriplegia.    Chronic lower back pain c/b left foot drop (2/2 herniated disc repair sx in 2010), now with spinal cord stimulator.  More bed bound as of May 2024.   - evaluate and treat pain as needed  - reduce pill burden as able    #Type 2 myocardial infarction.   Upon admission, elevated troponin T to 133 -> downtrended to 127. Lack of EKG changes on serial repeats. No chest pain at this time.   Multiple reasons to have demand/T2MI namely her CKD, poor perfusion from her HFrEF (EF 15%), as well as anemia with baseline hgb to ~8.  - CTM at this time  - treat CKD/HFrEF as above    #Chronic atrial fibrillation.   Daughter states this is chronic. Patient is not on AC for unclear reasons. CHADSVASC of 6. She had rectal bleeding at outside hospital several weeks ago and scope was deferred due to her cardiac condition. Rectal bleeding has stopped per daughter and hgb is stable since discharge and her fecal occult was negative.   - will defer to cardiology but likely hold starting full AC at this time given frequent bleeds/low baseline Hgb  - had been on Eliquis in the past but was stopped approx Dec '23 given on and off rectal bleed  - resume home toprol    #Home Health Needs  - From a medical view we recommend increased home health aide hours as the patient is now primarily bed bound which is a new change from prior needs    New medications/therapies: lasix new dose of 40mg daily PRN   New lines/hardware: none  Labs to be followed outpatient: none  Exam to be followed outpatient: PCP  Discharge plan: discharge to home.    Exam on Discharge:  Constitutional: NAD  Neurological: AAOx3  HEENT: PERRL, EOMI, sclera non-icteric, neck supple, +JVD, mildly dry MM  Respiratory: CTA b/l, good air entry b/l, no wheezing, mild crackles at lung bases b/l, without accessory muscle use and no intercostal retractions  Cardiovascular: irregular rhythm, normal S1S2, systolic murmur low grade loudest 2nd r upper intercostal, no gallops   Gastrointestinal: soft, tender to deep palpation, non distended, no masses or overt organomegaly palpable, BS normal  MSK: Tenderness over the lumbar spine  Extremities: feet without ulcers however hyperkeratosis present, trace pitting edema b/l LE below knee, muscle atrophy of distal right LE, LUE edematous. 3/5 strength to b/l lower legs limited due to back pain however. 89-year-old female history of chronic lower back pain c/b left foot drop (2/2 herniated disc repair sx in 2010), now with spinal cord stimulator, hypertension, breast ca (2014, s/p R breast lumpectomy + radiation but no chemo), Afib (not on AC), HFrEF (baseline EF is 15%) CKD (baseline cr 1.3-1.5), chronic anemia (baseline hgb 8), obstructive uropathy with recently placed mackenzie 2 weeks ago at [facility] and several recent hospitalizations at [facility] in May for hyponatremia, UTI (  pan sensitive E Coli treated with 5 day course of likely CTX) and urinary retention who presents today with worsening back pain, lethargy, confusion, nausea, and diarrhea, found to have uremia and acute on chronic renal failure, worsening HF and hyperkalemia.   Patient at baseline is reportedly AOx4 and was ambulating with a walker until the beginning of May per Daughter. Daughter reports pt has been declining since then. . Patient had Mackenzie placed for obstructive uropathy at 2nd [facility] hospitalization and was treated for UTI. She was discharged on 5/28 with Cr of 1.5, K of 5.2, Na of 132 and Hgb of 8. Since then, per daughter, pt has been utterly bedbound. On admission, K 6.9,  the patient received Calcium gluconate, bicarb, albuterol, insulin, D5 and Lokelma for hyperkalemia. K now is at 5 The patient is still in Lokelma TID. EKG without concerns. Cr 2.68 with baseline 1.5 renal was consulted, recommended diuresis with Lasix to help unload the heart as well as the hyponatremia (now improving). Pro BNP >81205. TTE (2024 [facility]): LV EF 15% with paradoxical septal motion with severe hypokinesis, dilated RV with reduced function, severe LA dilation, severe MR, moderate to severe TR, moderate PH. Cardiology were consulted, the patient recently stopped home Entresto for the concern of renal failure in [facility]. She is also on Metoprolol 50mg which was held per card for maintaining adequate HR iso reduced . They also recommended continuing diuresis. Inotropic support and HD though might be indicated at some point, the patient is deemed not a candidate considering her advanced age. She received a unit of pRBC this morning for Hb of 6.9 to improve her overall cardiovascular status.   As per her severe back pain, the technician responsible for the spinal stimulator came yesterday to adjust the device. The patient pain now has improved. She was put on pain control regimen with Dilaudid as well. She is stable to transfer to [facility].     Problem List/Main Diagnoses:   #Acute on chronic HFrEF (heart failure with reduced ejection fraction).   #HF exacerbation  Admitted with proBNP >70K and prominent cardiomegaly on CXR and CTAP. Patient with chronic HFrEF (baseline is EF of 15% per daughter), possible iso her CAD. Follows with an [facility] Cardiologist, Dr. Murray Dillard. Pt has JVD on admission however improved.  Home med is only toprol 50 XL, not on any other GDMT (was on Entresto, however did not tolerate it). Was also previously on metolazone and lasix but stopped by cardiologist, due to inability to tolerate.   Current TTE EF 15- 20%, PHTN, normal right ventricular size and function.   - lasix IV as needed, continue with lasix 40mg qd prn for leg swelling/sob  - resume home Toprol on discharge    #Urinary Tract infection  - Levaquin 750mg once daily for 5 days, 6/8-6/12/24    #Acute on chronic renal failure.   #ATN  #History of obstructive Uropathy  #Non-oliguric TODD on presumed CKD3   Suspect worsening renal failure is a combination of low flow cardiac state and GI losses and poor PO intake, in addition Feurea consistent with pre-renal etiology, likely 2/2 poor renal perfusion in setting of HF and venous congestion. No evidence of hydronephrosis on imaging.   BUN/Cr improving. Mentation has improved as per below.   - will go home on lasix 40 qd prn for sob/leg swelling  - Avoid nephrotoxic agents    #Chronic hyponatremia.   Pt presenting with Na of 127 on admission. The patient has been previously admitted to [facility] last month for the management of hyponatremia as well. Given the history and finding, likely a combination of tea and toast, heart failure as well as SIADH form the severe back pain.  Currently at baseline sodium per patient family.   - CW diuresis with Lasix as above     #Normocytic anemia.   Hgb stable since discharge from [facility] on 5/28. No evidence of bleeding although daughter states she has had some rectal bleeding recently on/off and was supposed to get a colonoscopy but deferred due to cardiac issues.  Baseline Hgb ~8. Found to be iron deficient (14)  - S/P 1 unit pRBC 6/6 of Hb 6.9   - consider iron supplement with PCP    #Functional quadriplegia.    Chronic lower back pain c/b left foot drop (2/2 herniated disc repair sx in 2010), now with spinal cord stimulator.  More bed bound as of May 2024.   - evaluate and treat pain as needed  - reduce pill burden as able    #Type 2 myocardial infarction.   Upon admission, elevated troponin T to 133 -> downtrended to 127. Lack of EKG changes on serial repeats. No chest pain at this time.   Multiple reasons to have demand/T2MI namely her CKD, poor perfusion from her HFrEF (EF 15%), as well as anemia with baseline hgb to ~8.  - CTM at this time  - treat CKD/HFrEF as above    #Chronic atrial fibrillation.   Daughter states this is chronic. Patient is not on AC for unclear reasons. CHADSVASC of 6. She had rectal bleeding at outside hospital several weeks ago and scope was deferred due to her cardiac condition. Rectal bleeding has stopped per daughter and hgb is stable since discharge and her fecal occult was negative.   - will defer to cardiology but likely hold starting full AC at this time given frequent bleeds/low baseline Hgb  - had been on Eliquis in the past but was stopped approx Dec '23 given on and off rectal bleed  - resume home toprol    #Home Health Needs  - From a medical view we recommend increased home health aide hours as the patient is now primarily bed bound which is a new change from prior needs    New medications/therapies: lasix new dose of 40mg daily PRN, levaquin 750mg qd x 5 days.  New lines/hardware: none  Labs to be followed outpatient: none  Exam to be followed outpatient: PCP  Discharge plan: discharge to home.    Exam on Discharge:  Constitutional: NAD  Neurological: AAOx3  HEENT: PERRL, EOMI, sclera non-icteric, neck supple, +JVD, mildly dry MM  Respiratory: CTA b/l, good air entry b/l, no wheezing, mild crackles at lung bases b/l, without accessory muscle use and no intercostal retractions  Cardiovascular: irregular rhythm, normal S1S2, systolic murmur low grade loudest 2nd r upper intercostal, no gallops   Gastrointestinal: soft, tender to deep palpation, non distended, no masses or overt organomegaly palpable, BS normal  MSK: Tenderness over the lumbar spine  Extremities: feet without ulcers however hyperkeratosis present, trace pitting edema b/l LE below knee, muscle atrophy of distal right LE, LUE edematous. 3/5 strength to b/l lower legs limited due to back pain however.

## 2024-06-07 NOTE — PROGRESS NOTE ADULT - PROBLEM SELECTOR PLAN 2
Improved.   Patient has chronic back pain and a spinal stimulator. Significant back pain on admission, appears musculoskeletal in nature.   On CT: spinal Degenerative changes. Spinal stimulator lead entrance thecal sac at T12-L1. Posterior fusion L4-L5.  Etiology likely chronic back pain, muscle spasm less likely at this time given localized to the spine, paravertebrals not tight on exam. Compression fracture less likely given lack of imaging findings/improvement s/p device manipulation.  - s/p Adjustment of the spinal stimulator,  performed 6/5 by a technician who always perform this task of the patient  - c Lidocaine patch  - c Tylenol  - Avoid NSAIDS given bleed history   - stop the 0.2 and 0.5 prn IV Dilaudid for severe pain at this time, can do PRN per patient pain  - bowel reg Patient has chronic back pain and a spinal stimulator. Significant back pain on admission, appears musculoskeletal in nature.   On CT: spinal Degenerative changes. Spinal stimulator lead entrance thecal sac at T12-L1. Posterior fusion L4-L5.  Etiology likely chronic back pain, muscle spasm less likely at this time given localized to the spine, paravertebrals not tight on exam. Compression fracture less likely given lack of imaging findings/improvement s/p device manipulation.  - s/p Adjustment of the spinal stimulator, performed 6/5 by a technician who always perform this task of the patient  - c Lidocaine patch  - c Tylenol  - Avoid NSAIDS given bleed history   - stop the 0.2 and 0.5 prn IV Dilaudid for severe pain at this time, can do PRN per patient pain  - bowel reg Patient has chronic back pain and a spinal stimulator. Significant back pain on admission, appears musculoskeletal in nature.   On CT: spinal Degenerative changes. Spinal stimulator lead entrance thecal sac at T12-L1. Posterior fusion L4-L5.  Etiology likely chronic back pain, muscle spasm less likely at this time given localized to the spine, paravertebrals not tight on exam. Compression fracture less likely given lack of imaging findings/improvement s/p device manipulation.  - s/p Adjustment of the spinal stimulator, performed 6/5 by a technician who always perform this task of the patient  - c Lidocaine patch  - c Tylenol  - Avoid NSAIDS given bleed history   - oral oxycodone PRN for pain  - bowel reg Patient has chronic back pain and a spinal stimulator. Appears musculoskeletal in nature.   On CT: spinal Degenerative changes. Spinal stimulator lead entrance thecal sac at T12-L1. Posterior fusion L4-L5.  Etiology likely chronic back pain, muscle spasm less likely at this time given localized to the spine, paravertebrals not tight on exam. Compression fracture less likely given lack of imaging findings/improvement s/p device manipulation.  - s/p Adjustment of the spinal stimulator, performed 6/5 by a technician who always perform this task of the patient  - c Lidocaine patch  - c Tylenol  - Avoid NSAIDS given bleed history   - oral oxycodone PRN for pain, oxy 2.5mg q6 hr for moderate PRN, 5mg q6hr for severe PRN  - bowel reg

## 2024-06-07 NOTE — PROGRESS NOTE ADULT - ASSESSMENT
89 yo F w/ PMH of ?CKD3-4 (recent outpatient sCr 2.56 however reportedly had sCr 1.5 at discharge from [facility] approx 1 week ago), s/p mackenzie for obstructive uropathy, HTN, HLD, HFrEF, afib, foot drop s/p spinal cord stimulator, breast ca s/p lumpectomy/RT, presenting with worsening back pain, lethargy, confusion, nausea, and diarrhea. Consulted for TODD with electrolyte disturbance, initial assessment revealed poor EF and urine studies consistent with pre-renal/perfusion-related etiology. S/p IV diuresis, now with sCr improving to admission level.    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 Less likely obstructive uropathy with mackenzie in place and no evidence of hydronephrosis on scan.  NAGMA secondary to above  - Given desire for palliative measures per family, would continue with symptom directed care for comfort  - Recommend continuing with Lasix, can give 80mg PO BID to maintain euvolemic status  - Otherwise continue current management for now until patient transitions to hospice care.  - Would avoid giving additional fluids at this time  - Renal diet  - Strict I&O  - Avoid nephrotoxic agents  - Maintain SBP >100 for renal perfusion  - Dose abx for current egfr    Hyponatremia - normal serum osm, suspect related to azotemia. Improved with diuresis.  - Avoid hypotonic fluids  - Fluid restriction <1L/day  - Cont diuresis

## 2024-06-07 NOTE — PROGRESS NOTE ADULT - PROBLEM SELECTOR PROBLEM 2
Acute exacerbation of chronic low back pain
Hyperkalemia
Acute exacerbation of chronic low back pain
Hyperkalemia

## 2024-06-07 NOTE — PROGRESS NOTE ADULT - ATTENDING COMMENTS
89 year old female with a PMHx of chronic lower back pain c/b left foot drop (s/p herniated disc repair and spinal cord stimulator), HTN, breast cancer (s/p lumpectomy and radiation), Afib (not on AC 2/2 GIB), ICM/HFrEF (EF 15%), CKD (baseline Cr 1.3 - 1.5), chronic anemia, obstructive uropathy (s/p mackenzie) and recurrent admissions for hyponatremia and UTI who presented with back pain, lethargy, confusion and diarrhea, found to have acute on chronic renal failure and hyperkalemia. Initially admitted to telemetry but now stable for RMF.     Plan:    -nephrology consulted, continue with PO Lasix 80mg BID and Lokelma    -cardiology consulted, follow up discharge recommendations, GDMT very limited by hemodynamics and renal function   -palliative care consulted, plan for home hospice  -continue with CTX, follow up UCx    Rest of plan as per resident note.

## 2024-06-07 NOTE — PROGRESS NOTE ADULT - PROBLEM SELECTOR PLAN 3
#ATN  #History of obstructive Uropathy  #Non-oliguric TODD on presumed CKD3   Suspect worsening renal failure is a combination of low flow cardiac state and GI losses and poor PO intake, in addition Feurea consistent with pre-renal etiology, likely 2/2 poor renal perfusion in setting of HF and venous congestion. No evidence of hydronephrosis on imaging.   BUN/Cr largely lateral. However, mentation has improved as per below.   - c lokelma 10 TID with hold parameters  - Repeat BMP bid  - Lasix 80mg BID as tolerated per renal however hold at this time given slightly dry on exam   - Continue with mackenzie catheter at this time, consider trial of void 6/7  - f/u cortisol AM  - Renal diet  - Avoid nephrotoxic agents #ATN  #History of obstructive Uropathy  #Non-oliguric TODD on presumed CKD3   Suspect worsening renal failure is a combination of low flow cardiac state and GI losses and poor PO intake, in addition Feurea consistent with pre-renal etiology, likely 2/2 poor renal perfusion in setting of HF and venous congestion. No evidence of hydronephrosis on imaging.   BUN/Cr largely lateral. However, mentation has improved as per below.   - c lokelma 10 TID with hold parameters  - Repeat BMP bid  - Lasix 80mg BID as tolerated per renal  - Continue with mackenzie catheter at this time, consider trial of void 6/7  - f/u cortisol AM  - Renal diet  - Avoid nephrotoxic agents #ATN  #History of obstructive Uropathy  #Non-oliguric TODD on presumed CKD3   Suspect worsening renal failure is a combination of low flow cardiac state and GI losses and poor PO intake, in addition Feurea consistent with pre-renal etiology, likely 2/2 poor renal perfusion in setting of HF and venous congestion. No evidence of hydronephrosis on imaging.   BUN/Cr largely lateral. However, mentation has improved as per below.   - c lokelma 10 TID with hold parameters  - Lasix 80mg BID as tolerated per renal  - Continue with mackenzie catheter at this time, consider trial of void 6/7  - Renal diet  - Avoid nephrotoxic agents #ATN  #History of obstructive Uropathy  #Non-oliguric TODD on presumed CKD3   Suspect worsening renal failure is a combination of low flow cardiac state and GI losses and poor PO intake, in addition Feurea consistent with pre-renal etiology, likely 2/2 poor renal perfusion in setting of HF and venous congestion. No evidence of hydronephrosis on imaging.   BUN/Cr improving. Mentation has improved as per below.   - c lokelma 10 TID with hold parameters  - Lasix prn, will go home on lasix 40 qd prn for sob/leg swelling  - pull mackenzie, trial of void 6/7  - Renal diet  - Avoid nephrotoxic agents

## 2024-06-07 NOTE — PROGRESS NOTE ADULT - ATTENDING COMMENTS
I agree with the fellow's findings and plans as written above with the following additions/amendments:    Seen and examined at bedside. More awake, interactive. Going to hospice care. Discussed symptom management at length    In patients with kidney failure, suggest the following for symptom management:  Pain, Air hunger - Hydromorphone or fentanyl, tramadol, gabapentin. Avoid morphine given active metabolites causing delirium, avoid codeine given increased hypotension and delirium  Nausea and Vomiting - Ondansetron, metoclopramide, haldol; Methotrimeprazine if refractory  Respiratory tract secretions - Glycopyrronium ½ dose, Scopolamine patches  Terminal Agitation - Midazolam, Haloperidol  Dyspnea - Fentanyl or Hydromorphone, Diuretic; Nonmedical – fans, positioning, relaxation

## 2024-06-07 NOTE — PROGRESS NOTE ADULT - SUBJECTIVE AND OBJECTIVE BOX
**INCOMPLETE NOTE    OVERNIGHT EVENTS: None    SUBJECTIVE:  Patient seen and examined at bedside, comfortable, NAD. Denied fever, chest pain, dyspnea, abdominal pain.     Vital Signs Last 12 Hrs  T(F): 98 (06-07-24 @ 05:39), Max: 98 (06-07-24 @ 05:39)  HR: 70 (06-07-24 @ 05:39) (70 - 88)  BP: 100/71 (06-07-24 @ 05:39) (100/71 - 129/75)  BP(mean): 93 (06-06-24 @ 20:51) (93 - 93)  RR: 17 (06-07-24 @ 05:39) (16 - 17)  SpO2: 98% (06-07-24 @ 05:39) (94% - 98%)  I&O's Summary    06 Jun 2024 07:01  -  07 Jun 2024 07:00  --------------------------------------------------------  IN: 300 mL / OUT: 275 mL / NET: 25 mL        PHYSICAL EXAM:  Constitutional: NAD, slouched over slightly in bed  Neurological: AAOx2-3 (knows she is in [facility], thought [facility])  HEENT: PERRL, EOMI, sclera non-icteric, neck supple, +JVD, mildly dry MM  Respiratory: CTA b/l, good air entry b/l, no wheezing, no crackles/rales, without accessory muscle use and no intercostal retractions  Cardiovascular: irregular rhythm, normal S1S2, systolic murmur low grade loudest 2nd r upper intercostal, no gallops   Gastrointestinal: soft, tender to deep palpation, non distended, no masses or overt organomegaly palpable, BS normal  MSK: Tenderness over the lumbar spine  Extremities: feet without ulcers however hyperkeratosis present, trace pitting edema b/l LE below knee, muscle atrophy of distal right LE, LUE edematous. 3/5 strength to b/l lower legs limited due to back pain however.         LABS:                        8.4    5.21  )-----------( 270      ( 06 Jun 2024 12:37 )             25.1     06-06    127<L>  |  92<L>  |  83<H>  ----------------------------<  85  5.0   |  22  |  3.09<H>    Ca    9.1      06 Jun 2024 12:37  Phos  5.4     06-06  Mg     2.2     06-06    TPro  5.8<L>  /  Alb  3.0<L>  /  TBili  0.4  /  DBili  x   /  AST  18  /  ALT  23  /  AlkPhos  82  06-06      Urinalysis Basic - ( 06 Jun 2024 12:37 )    Color: x / Appearance: x / SG: x / pH: x  Gluc: 85 mg/dL / Ketone: x  / Bili: x / Urobili: x   Blood: x / Protein: x / Nitrite: x   Leuk Esterase: x / RBC: x / WBC x   Sq Epi: x / Non Sq Epi: x / Bacteria: x          RADIOLOGY & ADDITIONAL TESTS:    MEDICATIONS  (STANDING):  aspirin  chewable 81 milliGRAM(s) Oral daily  atorvastatin 40 milliGRAM(s) Oral at bedtime  cefTRIAXone Injectable. 1000 milliGRAM(s) IV Push every 24 hours  heparin   Injectable 5000 Unit(s) SubCutaneous every 8 hours  lidocaine   4% Patch 1 Patch Transdermal every 24 hours  pantoprazole    Tablet 40 milliGRAM(s) Oral every 12 hours  sodium zirconium cyclosilicate 10 Gram(s) Oral every 8 hours  thiamine 100 milliGRAM(s) Oral every 24 hours    MEDICATIONS  (PRN):  HYDROmorphone  Injectable 0.5 milliGRAM(s) IV Push every 4 hours PRN Severe Pain (7 - 10)  HYDROmorphone  Injectable 0.2 milliGRAM(s) IV Push every 4 hours PRN Moderate Pain (4 - 6)  polyethylene glycol 3350 17 Gram(s) Oral every 24 hours PRN Constipation  senna 2 Tablet(s) Oral at bedtime PRN Constipation   **INCOMPLETE NOTE    OVERNIGHT EVENTS: None    SUBJECTIVE:  Patient seen and examined at bedside, comfortable, NAD. Denied fever, chest pain, dyspnea, abdominal pain.     Vital Signs Last 12 Hrs  T(F): 98 (06-07-24 @ 05:39), Max: 98 (06-07-24 @ 05:39)  HR: 70 (06-07-24 @ 05:39) (70 - 88)  BP: 100/71 (06-07-24 @ 05:39) (100/71 - 129/75)  BP(mean): 93 (06-06-24 @ 20:51) (93 - 93)  RR: 17 (06-07-24 @ 05:39) (16 - 17)  SpO2: 98% (06-07-24 @ 05:39) (94% - 98%)  I&O's Summary    06 Jun 2024 07:01  -  07 Jun 2024 07:00  --------------------------------------------------------  IN: 300 mL / OUT: 275 mL / NET: 25 mL        PHYSICAL EXAM:  Constitutional: NAD, slouched over slightly in bed  Neurological: AAOx2-3 (knows she is in [facility], thought [facility])  HEENT: PERRL, EOMI, sclera non-icteric, neck supple, +JVD, mildly dry MM  Respiratory: CTA b/l, good air entry b/l, no wheezing, mild crackles at lung bases b/l, without accessory muscle use and no intercostal retractions  Cardiovascular: irregular rhythm, normal S1S2, systolic murmur low grade loudest 2nd r upper intercostal, no gallops   Gastrointestinal: soft, tender to deep palpation, non distended, no masses or overt organomegaly palpable, BS normal  MSK: Tenderness over the lumbar spine  Extremities: feet without ulcers however hyperkeratosis present, trace pitting edema b/l LE below knee, muscle atrophy of distal right LE, LUE edematous. 3/5 strength to b/l lower legs limited due to back pain however.         LABS:                        8.4    5.21  )-----------( 270      ( 06 Jun 2024 12:37 )             25.1     06-06    127<L>  |  92<L>  |  83<H>  ----------------------------<  85  5.0   |  22  |  3.09<H>    Ca    9.1      06 Jun 2024 12:37  Phos  5.4     06-06  Mg     2.2     06-06    TPro  5.8<L>  /  Alb  3.0<L>  /  TBili  0.4  /  DBili  x   /  AST  18  /  ALT  23  /  AlkPhos  82  06-06      Urinalysis Basic - ( 06 Jun 2024 12:37 )    Color: x / Appearance: x / SG: x / pH: x  Gluc: 85 mg/dL / Ketone: x  / Bili: x / Urobili: x   Blood: x / Protein: x / Nitrite: x   Leuk Esterase: x / RBC: x / WBC x   Sq Epi: x / Non Sq Epi: x / Bacteria: x          RADIOLOGY & ADDITIONAL TESTS:    MEDICATIONS  (STANDING):  aspirin  chewable 81 milliGRAM(s) Oral daily  atorvastatin 40 milliGRAM(s) Oral at bedtime  cefTRIAXone Injectable. 1000 milliGRAM(s) IV Push every 24 hours  heparin   Injectable 5000 Unit(s) SubCutaneous every 8 hours  lidocaine   4% Patch 1 Patch Transdermal every 24 hours  pantoprazole    Tablet 40 milliGRAM(s) Oral every 12 hours  sodium zirconium cyclosilicate 10 Gram(s) Oral every 8 hours  thiamine 100 milliGRAM(s) Oral every 24 hours    MEDICATIONS  (PRN):  HYDROmorphone  Injectable 0.5 milliGRAM(s) IV Push every 4 hours PRN Severe Pain (7 - 10)  HYDROmorphone  Injectable 0.2 milliGRAM(s) IV Push every 4 hours PRN Moderate Pain (4 - 6)  polyethylene glycol 3350 17 Gram(s) Oral every 24 hours PRN Constipation  senna 2 Tablet(s) Oral at bedtime PRN Constipation   OVERNIGHT EVENTS:  - None    SUBJECTIVE:  -  Patient seen and examined at bedside, comfortable, NAD. Denied fever, chest pain, dyspnea, abdominal pain.   - overall happy to have seen grandson    Vital Signs Last 12 Hrs  T(F): 98 (06-07-24 @ 05:39), Max: 98 (06-07-24 @ 05:39)  HR: 70 (06-07-24 @ 05:39) (70 - 88)  BP: 100/71 (06-07-24 @ 05:39) (100/71 - 129/75)  BP(mean): 93 (06-06-24 @ 20:51) (93 - 93)  RR: 17 (06-07-24 @ 05:39) (16 - 17)  SpO2: 98% (06-07-24 @ 05:39) (94% - 98%)    I&O's Summary    06 Jun 2024 07:01  -  07 Jun 2024 07:00  --------------------------------------------------------  IN: 300 mL / OUT: 275 mL / NET: 25 mL    PHYSICAL EXAM:  Constitutional: NAD, slouched over slightly in bed  Neurological: AAOx2-3 (knows she is in [facility], thought [facility])  HEENT: PERRL, EOMI, sclera non-icteric, neck supple, +JVD, mildly dry MM  Respiratory: CTA b/l, good air entry b/l, no wheezing, mild crackles at lung bases b/l, without accessory muscle use and no intercostal retractions  Cardiovascular: irregular rhythm, normal S1S2, systolic murmur low grade loudest 2nd r upper intercostal, no gallops   Gastrointestinal: soft, tender to deep palpation, non distended, no masses or overt organomegaly palpable, BS normal  MSK: Tenderness over the lumbar spine  Extremities: feet without ulcers however hyperkeratosis present, trace pitting edema b/l LE below knee, muscle atrophy of distal right LE, LUE edematous. 3/5 strength to b/l lower legs limited due to back pain however.         LABS:                        8.4    5.21  )-----------( 270      ( 06 Jun 2024 12:37 )             25.1     06-06    127<L>  |  92<L>  |  83<H>  ----------------------------<  85  5.0   |  22  |  3.09<H>    Ca    9.1      06 Jun 2024 12:37  Phos  5.4     06-06  Mg     2.2     06-06    TPro  5.8<L>  /  Alb  3.0<L>  /  TBili  0.4  /  DBili  x   /  AST  18  /  ALT  23  /  AlkPhos  82  06-06      Urinalysis Basic - ( 06 Jun 2024 12:37 )    Color: x / Appearance: x / SG: x / pH: x  Gluc: 85 mg/dL / Ketone: x  / Bili: x / Urobili: x   Blood: x / Protein: x / Nitrite: x   Leuk Esterase: x / RBC: x / WBC x   Sq Epi: x / Non Sq Epi: x / Bacteria: x          RADIOLOGY & ADDITIONAL TESTS:    MEDICATIONS  (STANDING):  aspirin  chewable 81 milliGRAM(s) Oral daily  atorvastatin 40 milliGRAM(s) Oral at bedtime  cefTRIAXone Injectable. 1000 milliGRAM(s) IV Push every 24 hours  heparin   Injectable 5000 Unit(s) SubCutaneous every 8 hours  lidocaine   4% Patch 1 Patch Transdermal every 24 hours  pantoprazole    Tablet 40 milliGRAM(s) Oral every 12 hours  sodium zirconium cyclosilicate 10 Gram(s) Oral every 8 hours  thiamine 100 milliGRAM(s) Oral every 24 hours    MEDICATIONS  (PRN):  HYDROmorphone  Injectable 0.5 milliGRAM(s) IV Push every 4 hours PRN Severe Pain (7 - 10)  HYDROmorphone  Injectable 0.2 milliGRAM(s) IV Push every 4 hours PRN Moderate Pain (4 - 6)  polyethylene glycol 3350 17 Gram(s) Oral every 24 hours PRN Constipation  senna 2 Tablet(s) Oral at bedtime PRN Constipation   OVERNIGHT EVENTS:  - None    SUBJECTIVE:  - Patient seen and examined at bedside, comfortable, NAD. Denied fever, chest pain, dyspnea, abdominal pain.   - overall happy to have seen grandson    Vital Signs Last 12 Hrs  T(F): 98 (06-07-24 @ 05:39), Max: 98 (06-07-24 @ 05:39)  HR: 70 (06-07-24 @ 05:39) (70 - 88)  BP: 100/71 (06-07-24 @ 05:39) (100/71 - 129/75)  BP(mean): 93 (06-06-24 @ 20:51) (93 - 93)  RR: 17 (06-07-24 @ 05:39) (16 - 17)  SpO2: 98% (06-07-24 @ 05:39) (94% - 98%)    I&O's Summary    06 Jun 2024 07:01  -  07 Jun 2024 07:00  --------------------------------------------------------  IN: 300 mL / OUT: 275 mL / NET: 25 mL    PHYSICAL EXAM:  Constitutional: NAD  Neurological: AAOx3  HEENT: PERRL, EOMI, sclera non-icteric, neck supple, +JVD, mildly dry MM  Respiratory: CTA b/l, good air entry b/l, no wheezing, mild crackles at lung bases b/l, without accessory muscle use and no intercostal retractions  Cardiovascular: irregular rhythm, normal S1S2, systolic murmur low grade loudest 2nd r upper intercostal, no gallops   Gastrointestinal: soft, tender to deep palpation, non distended, no masses or overt organomegaly palpable, BS normal  MSK: Tenderness over the lumbar spine  Extremities: feet without ulcers however hyperkeratosis present, trace pitting edema b/l LE below knee, muscle atrophy of distal right LE, LUE edematous. 3/5 strength to b/l lower legs limited due to back pain however.     LABS:                        8.4    5.21  )-----------( 270      ( 06 Jun 2024 12:37 )             25.1     06-06    127<L>  |  92<L>  |  83<H>  ----------------------------<  85  5.0   |  22  |  3.09<H>    Ca    9.1      06 Jun 2024 12:37  Phos  5.4     06-06  Mg     2.2     06-06    TPro  5.8<L>  /  Alb  3.0<L>  /  TBili  0.4  /  DBili  x   /  AST  18  /  ALT  23  /  AlkPhos  82  06-06      Urinalysis Basic - ( 06 Jun 2024 12:37 )    Color: x / Appearance: x / SG: x / pH: x  Gluc: 85 mg/dL / Ketone: x  / Bili: x / Urobili: x   Blood: x / Protein: x / Nitrite: x   Leuk Esterase: x / RBC: x / WBC x   Sq Epi: x / Non Sq Epi: x / Bacteria: x    RADIOLOGY & ADDITIONAL TESTS:  - reviewed    MEDICATIONS  (STANDING):  aspirin  chewable 81 milliGRAM(s) Oral daily  atorvastatin 40 milliGRAM(s) Oral at bedtime  cefTRIAXone Injectable. 1000 milliGRAM(s) IV Push every 24 hours  heparin   Injectable 5000 Unit(s) SubCutaneous every 8 hours  lidocaine   4% Patch 1 Patch Transdermal every 24 hours  pantoprazole    Tablet 40 milliGRAM(s) Oral every 12 hours  sodium zirconium cyclosilicate 10 Gram(s) Oral every 8 hours  thiamine 100 milliGRAM(s) Oral every 24 hours    MEDICATIONS  (PRN):  HYDROmorphone  Injectable 0.5 milliGRAM(s) IV Push every 4 hours PRN Severe Pain (7 - 10)  HYDROmorphone  Injectable 0.2 milliGRAM(s) IV Push every 4 hours PRN Moderate Pain (4 - 6)  polyethylene glycol 3350 17 Gram(s) Oral every 24 hours PRN Constipation  senna 2 Tablet(s) Oral at bedtime PRN Constipation

## 2024-06-07 NOTE — PROGRESS NOTE ADULT - PROBLEM SELECTOR PROBLEM 3
Acute exacerbation of chronic low back pain
Acute on chronic renal failure
Acute on chronic renal failure
Acute exacerbation of chronic low back pain

## 2024-06-07 NOTE — PROGRESS NOTE ADULT - PROBLEM SELECTOR PROBLEM 4
Chronic hyponatremia
Acute on chronic renal failure
Chronic hyponatremia
Acute on chronic renal failure

## 2024-06-07 NOTE — PROGRESS NOTE ADULT - PROBLEM SELECTOR PLAN 7
Upon admission, elevated troponin T to 133 -> downtrended to 127. Lack of EKG changes on serial repeats. No chest pain at this time.   Multiple reasons to have demand/T2MI namely her CKD, poor perfusion from her HFrEF (EF 15%), as well as anemia with baseline hgb to ~8.  - CTM at this time  - treat CKD/HFrEF as above  - monitor perfusion status  - maintain MAP > 65 as able

## 2024-06-07 NOTE — PROGRESS NOTE ADULT - PROBLEM SELECTOR PLAN 4
Pt presenting with Na of 127 on admission. The patient has been previously admitted to [facility] last month for the management of hyponatremia as well. Given the history and finding, likely a combination of tea and toast, heart failure as well as SIADH form the severe back pain.  Currently at baseline sodium per patient family.   - CW diuresis with Lasix as above   - No more fluids for now given the patient is volume overloaded and HF   - Trend BMP  - FU renal recs    #Hyperkalemia  Improving.   Admitted with K 6.7. Hyperkalemia iso acute renal failure as well as chronic obstructive uropathy.  Now improving, last K 5.0.  - C lokelma 10 TID with hold parameters  - Trend EKGs  - Can give 50-100meq bicarb push and repeat as needed, hold at this time Pt presenting with Na of 127 on admission. The patient has been previously admitted to [facility] last month for the management of hyponatremia as well. Given the history and finding, likely a combination of tea and toast, heart failure as well as SIADH form the severe back pain.  Currently at baseline sodium per patient family.   - CW diuresis with Lasix as above   - No more fluids for now given the patient is volume overloaded and HF   - Trend BMP  - FU renal recs    #Hyperkalemia  Improving.   Admitted with K 6.7. Hyperkalemia iso acute renal failure as well as chronic obstructive uropathy.  Now improving, last K 5.0.  - C lokelma 10 TID with hold parameters  - Trend EKGs

## 2024-06-07 NOTE — PROGRESS NOTE ADULT - PROBLEM SELECTOR PLAN 5
Hgb stable since discharge from [facility] on 5/28. No evidence of bleeding although daughter states she has had some rectal bleeding recently on/off and was supposed to get a colonoscopy but deferred due to cardiac issues.  Baseline Hgb ~8.  - S/P 1 unit pRBC 6/6 of Hb 6.9   - repeat Iron studies, B12, folate, retic, LDH, Hapto (hemolyzed)  - Active Type and screen  - Transfuse for Hgb <7, consider <8 given her CAD and elevated troponin on presentation Hgb stable since discharge from [facility] on 5/28. No evidence of bleeding although daughter states she has had some rectal bleeding recently on/off and was supposed to get a colonoscopy but deferred due to cardiac issues.  Baseline Hgb ~8. Found to be iron deficient (14)  - S/P 1 unit pRBC 6/6 of Hb 6.9   - Active Type and screen  - Transfuse for Hgb <7

## 2024-06-07 NOTE — DISCHARGE NOTE PROVIDER - CARE PROVIDER_API CALL
TABITHA MOREIRA, SURESH  [address] [place] [address] [place] [address]  Phone: [phone]  Fax: [phone]  Established Patient  Scheduled Appointment: 06/25/2024 12:15 PM

## 2024-06-07 NOTE — DISCHARGE NOTE PROVIDER - NSDCCPTREATMENT_GEN_ALL_CORE_FT
PRINCIPAL PROCEDURE  Procedure: CT abdomen pelvis  Findings and Treatment: FINDINGS:  LOWER CHEST: Cardiomegaly. Small bilateral pleural effusions. Bilateral   calcified pleural plaques. Bibasilar atelectasis.  LIVER: Within normal limits.  BILE DUCTS: Normal caliber.  GALLBLADDER: Cholelithiasis.  SPLEEN: Within normal limits.  PANCREAS: Within normal limits.  ADRENALS: Within normal limits.  KIDNEYS/URETERS: Borderline atrophic kidneys. No hydronephrosis. 1.2 cm   left hyperattenuating renal nodule. 5.3 cm hyperattenuating cyst on the   right. Additional bilateral cysts. No hydronephrosis or nephrolithiasis.  BLADDER: Decompressed by Zepeda catheter.  REPRODUCTIVE ORGANS: Enlarged multi fibroid uterus.  BOWEL: No bowel obstruction. Appendix is not visualized. No evidence of   inflammation in the pericecal region.  PERITONEUM: No ascites. No free air  VESSELS: Atherosclerotic changes.  RETROPERITONEUM/LYMPH NODES: No lymphadenopathy.  ABDOMINAL WALL: Anasarca. Spinal stimulator in the left buttock.  BONES: Degenerative changes. Spinal stimulator lead entrance thecal sac   at T12-L1. Posterior fusion L4-L5.  IMPRESSION:  Marked cardiomegaly. Small pleural effusions. Anasarca.  No acute abnormality in the abdomen and pelvis.  Borderline atrophic kidneys. No hydronephrosis.  Probable hyperdense renal cysts. Recommend ultrasound.  Enlarged multi fibroid uterus.        SECONDARY PROCEDURE  Procedure: US retroperitoneum limited  Findings and Treatment: FINDINGS:  Echogenic bilateral kidneys.  Right kidney: 12.7 cm. No hydronephrosis. Cysts measuring up to 4.7 cm.  Left kidney: 7.5 cm. No hydronephrosis. Cysts measuring up to 1.4 cm.  IMPRESSION:  Echogenic kidneys compatible with parenchymal disease. No hydronephrosis.

## 2024-06-07 NOTE — DISCHARGE NOTE PROVIDER - NPI NUMBER (FOR SYSADMIN USE ONLY) :
[[id number]]
The scribe's documentation has been prepared under my direction and personally reviewed by me in its entirety. I confirm that the note above accurately reflects all work, treatment, procedures, and medical decision making performed by me.  Chitra Rodas MD

## 2024-06-07 NOTE — DISCHARGE NOTE PROVIDER - PROVIDER TOKENS
PROVIDER:[TOKEN:[[id number]],SCHEDULEDAPPT:[06/25/2024],SCHEDULEDAPPTTIME:[12:15 PM],ESTABLISHEDPATIENT:[T]]

## 2024-06-07 NOTE — DISCHARGE NOTE NURSING/CASE MANAGEMENT/SOCIAL WORK - PATIENT PORTAL LINK FT
You can access the FollowMyHealth Patient Portal offered by [facility] by registering at the following website: [email or web address]. By joining [facility]’s FollowMyHealth portal, you will also be able to view your health information using other applications (apps) compatible with our system.

## 2024-06-07 NOTE — PROGRESS NOTE ADULT - ASSESSMENT
89-year-old female history of chronic lower back pain c/b left foot drop (2/2 herniated disc repair sx in 2010), now with spinal cord stimulator, HTN, breast ca (2014, s/p R breast lumpectomy + radiation but no chemo), Afib (not on AC), HFrEF (baseline EF is 15%) CKD (baseline cr 1.3-1.5), chronic anemia (baseline hgb 8), obstructive uropathy with recently placed mackenzie 2 weeks ago at [facility] and several recent hospitalizations at [facility] in May for hyponatremia and urinary retention who presented w/ worsening back pain, lethargy, confusion, nausea, and diarrhea, found to have uremia and acute on chronic renal failure and hyperkalemia.  89-year-old female history of chronic lower back pain c/b left foot drop (2/2 herniated disc repair sx in 2010), now with spinal cord stimulator, HTN, breast ca (2014, s/p R breast lumpectomy + radiation but no chemo), Afib (not on AC), HFrEF (baseline EF is 15%) CKD (baseline cr 1.3-1.5), chronic anemia (baseline hgb 8), obstructive uropathy with recently placed mackenzie 2 weeks ago at [facility] and several recent hospitalizations at [facility] in May for hyponatremia and urinary retention who presented w/ worsening back pain, lethargy, confusion, nausea, and diarrhea, found to have uremia and acute on chronic renal failure and hyperkalemia, now improving.  89-year-old female history of chronic lower back pain c/b left foot drop (2/2 herniated disc repair sx in 2010), now with spinal cord stimulator, HTN, breast ca (2014, s/p R breast lumpectomy + radiation but no chemo), Afib (not on AC), HFrEF (baseline EF is 15%) CKD (baseline cr 1.3-1.5), chronic anemia (baseline hgb 8), obstructive uropathy with [facility] and several recent hospitalizations at [facility] in May for hyponatremia and urinary retention who presented w/ worsening back pain, lethargy, confusion, nausea, and diarrhea, found to have uremia and acute on chronic renal failure and hyperkalemia, now improving. 89-year-old female w/ PMH of chronic lower back pain c/b left foot drop (2/2 herniated disc repair sx in 2010), now with spinal cord stimulator, HTN, breast ca (2014, s/p R breast lumpectomy + radiation but no chemo), Afib (not on AC), HFrEF (EF 15%) CKD (baseline cr 1.3-1.5), chronic anemia (baseline hgb 8), obstructive uropathy with recently placed mackenzie 2 weeks ago at [facility] and recent hospitalizations at [facility] in May for hyponatremia and urinary retention who presented w/ worsening back pain, lethargy, confusion, nausea, and diarrhea, found to have uremia, HF exacerbation, and acute on chronic renal failure w/ hyperkalemia, now improving.

## 2024-06-07 NOTE — PROGRESS NOTE ADULT - PROBLEM SELECTOR PROBLEM 11
Encounter for palliative care
Prophylactic measure
Prophylactic measure
Encounter for palliative care

## 2024-06-07 NOTE — PROGRESS NOTE ADULT - REASON FOR ADMISSION
acute on chronic renal failure

## 2024-06-07 NOTE — PROGRESS NOTE ADULT - PROVIDER SPECIALTY LIST ADULT
Cardiology
Nephrology
Internal Medicine
Nephrology
Internal Medicine

## 2024-06-07 NOTE — PROGRESS NOTE ADULT - PROBLEM SELECTOR PLAN 1
#HF exacerbation  Admitted with proBNP >70K and prominent cardiomegaly on CXR and CTAP. Patient with chronic HFrEF (baseline is EF of 15% per daughter), possible iso her CAD. Follows with an [facility] Cardiologist, Dr. Murray Dillard. Pt has JVD on admission however improved, no LE edema, no crackles.    Home med is only toprol 50 XL, not on any other GDMT (was on Entresto, however did not tolerate it per daughter, likely from renal failure) Was also previously on metolazone and lasix but stopped by cardiologist.    Current TTE EF 15- 20%, PHTN, normal right ventricular size and function.   - lasix IV as needed, at this time hold given trace edema b/l and appearing dry on exam and could be contributing to poor kidney perfusion   - strict I's & O's  - daily weights  - appreciate Cardiology recommendations  - holding home Toprol (avoiding negative inotropes at this time) #HF exacerbation  Admitted with proBNP >70K and prominent cardiomegaly on CXR and CTAP. Patient with chronic HFrEF (baseline is EF of 15% per daughter), possible iso her CAD. Follows with an [facility] Cardiologist, Dr. Murray Dillard. Pt has JVD on admission however improved, no LE edema, no crackles.    Home med is only toprol 50 XL, not on any other GDMT (was on Entresto, however did not tolerate it per daughter, likely from renal failure) Was also previously on metolazone and lasix but stopped by cardiologist.    Current TTE EF 15- 20%, PHTN, normal right ventricular size and function.   - lasix IV as needed  - strict I's & O's  - daily weights  - appreciate Cardiology recommendations  - holding home Toprol (avoiding negative inotropes at this time) #HF exacerbation  Admitted with proBNP >70K and prominent cardiomegaly on CXR and CTAP. Patient with chronic HFrEF (baseline is EF of 15% per daughter), possible iso her CAD. Follows with an [facility] Cardiologist, Dr. Murray Dillard. Pt has JVD on admission however improved.  Home med is only toprol 50 XL, not on any other GDMT (was on Entresto, however did not tolerate it). Was also previously on metolazone and lasix but stopped by cardiologist, due to inability to tolerate.   Current TTE EF 15- 20%, PHTN, normal right ventricular size and function.   - lasix IV as needed, plan to discharge on lasix 40mg qd prn for leg swelling/sob  - strict I's & O's  - daily weights  - appreciate Cardiology recommendations  - holding home Toprol (avoiding negative inotropes at this time), okay to restart toprol on d/c per cards  - wean O2, satting well on 2L NC, wean to room air

## 2024-06-07 NOTE — PROGRESS NOTE ADULT - PROBLEM SELECTOR PLAN 11
F: cautious with fluids in HF exacerbation  E: cautious in K repletion in ARF  N: Renal diet  DVT Ppx: Hep subq  Code Status: DNR/DNI
Palliative was consulted, given the multiple comorbidities and the need for a plan in case the patient condition deteriorates further.
F: cautious with fluids in HF exacerbation  E: cautious in K repletion in ARF  N: Renal diet  DVT Ppx: Hep subq  Code Status: DNR/DNI
Palliative was consulted, given the multiple comorbidities and the need for a plan in case the patient condition deteriorates further.

## 2024-06-07 NOTE — DISCHARGE NOTE PROVIDER - NSDCCPCAREPLAN_GEN_ALL_CORE_FT
PRINCIPAL DISCHARGE DIAGNOSIS  Diagnosis: Acute on chronic HFrEF (heart failure with reduced ejection fraction)  Assessment and Plan of Treatment: You have a history of heart failure prior to your admission. Heart failure can cause make it harder for your heart to pump blood to the rest of your body. As a result, blood can get backed up into your lungs or into your legs. In order to avoid this, make sure to take all of your medications for heart failure as prescribed. This will keep your heart functioning well. If you notice increased difficulty with breathing, cough with bloody mucous, increased swelling in the legs, or chest pain be sure to contact your PCP or call 911 as you may need more treatment. Additionally be sure to follow up with your PCP and Cardiologist on a regular basis to make sure no additional medications or medication changes need to be mades  Please resume your toprol upon discharge and please follow up with your PCP!        SECONDARY DISCHARGE DIAGNOSES  Diagnosis: TODD (acute kidney injury)  Assessment and Plan of Treatment: During this hospital admission you were found to have acute kidney injury. Acute kidney injury (TODD) is a sudden episode of kidney failure or kidney damage that happens within a few hours or a few days. TODD causes a build-up of waste products in your blood and makes it hard for your kidneys to keep the right balance of fluid in your body. Your kidney numbers have been improving. Please follow up with your primary care doctor for further evaluation and testing.  Please take lasix 40mg daily as needed if you have shortness of breath or leg swelling.     PRINCIPAL DISCHARGE DIAGNOSIS  Diagnosis: Acute on chronic HFrEF (heart failure with reduced ejection fraction)  Assessment and Plan of Treatment: You have a history of heart failure prior to your admission. Heart failure can cause make it harder for your heart to pump blood to the rest of your body. As a result, blood can get backed up into your lungs or into your legs. In order to avoid this, make sure to take all of your medications for heart failure as prescribed. This will keep your heart functioning well. If you notice increased difficulty with breathing, cough with bloody mucous, increased swelling in the legs, or chest pain be sure to contact your PCP or call 911 as you may need more treatment. Additionally be sure to follow up with your PCP and Cardiologist on a regular basis to make sure no additional medications or medication changes need to be mades  Please resume your toprol upon discharge and please follow up with your PCP!        SECONDARY DISCHARGE DIAGNOSES  Diagnosis: TODD (acute kidney injury)  Assessment and Plan of Treatment: During this hospital admission you were found to have acute kidney injury. Acute kidney injury (TODD) is a sudden episode of kidney failure or kidney damage that happens within a few hours or a few days. TODD causes a build-up of waste products in your blood and makes it hard for your kidneys to keep the right balance of fluid in your body. Your kidney numbers have been improving. Please follow up with your primary care doctor for further evaluation and testing.  Please take lasix 40mg daily as needed if you have shortness of breath or leg swelling.    Diagnosis: Urinary tract infection  Assessment and Plan of Treatment: A urinary tract infection (UTI) is an infection in any part of the urinary system. The urinary system includes the kidneys, ureters, bladder and urethra. Most infections involve the lower urinary tract — the bladder and the urethra.  Women are at greater risk of developing a UTI than are men. If an infection is limited to the bladder, it can be painful and annoying. But serious health problems can result if a UTI spreads to the kidneys.  Health care providers often treat urinary tract infections with antibiotics. You can also take steps to lower the chance of getting a UTI in the first place.  Please take levaquin 750mg once daily for 5 days, starting 6/8/24 and ending 6/12/24.

## 2024-06-07 NOTE — PROGRESS NOTE ADULT - PROBLEM SELECTOR PLAN 8
Daughter states this is chronic. Patient is not on AC for unclear reasons. CHADSVASC of 6. She had rectal bleeding at outside hospital several weeks ago and scope was deferred due to her cardiac condition. Rectal bleeding has stopped per daughter and hgb is stable since discharge and her fecal occult was negative.   - consider initiating full AC, will defer to cardiology, likely hold at this time given frequent bleeds/low baseline Hgb  - had been on Eliquis in the past but was stopped approx Dec '23 given on and off rectal bleed  - Hold off on Toprol given concern for low flow state and marginal pressures and patient is currently rate controlled Daughter states this is chronic. Patient is not on AC for unclear reasons. CHADSVASC of 6. She had rectal bleeding at outside hospital several weeks ago and scope was deferred due to her cardiac condition. Rectal bleeding has stopped per daughter and hgb is stable since discharge and her fecal occult was negative.   - will defer to cardiology but likely hold starting full AC at this time given frequent bleeds/low baseline Hgb  - had been on Eliquis in the past but was stopped approx Dec '23 given on and off rectal bleed  - Hold off on Toprol given concern for low flow state and marginal pressures and patient is currently rate controlled, restart on d/c per cards

## 2024-06-07 NOTE — DISCHARGE NOTE PROVIDER - NSDCMRMEDTOKEN_GEN_ALL_CORE_FT
aspirin 81 mg oral tablet: 1 tab(s) orally once a day  Lasix 40 mg oral tablet: 1 tab(s) orally once a day as needed for  shortness of breath and/or wheezing take one pill daily as needed for swelling or shortness of breath  Metoprolol Succinate ER 50 mg oral tablet, extended release: 1 tab(s) orally once a day  pantoprazole 40 mg oral delayed release tablet: 1 tab(s) orally 2 times a day  polyethylene glycol 3350 oral powder for reconstitution: 17 gram(s) orally every 24 hours As needed Constipation  rosuvastatin 10 mg oral tablet: 1 tab(s) orally once a day (at bedtime)  senna leaf extract oral tablet: 2 tab(s) orally once a day (at bedtime) As needed Constipation  thiamine 100 mg oral tablet: 1 tab(s) orally every 24 hours   aspirin 81 mg oral tablet: 1 tab(s) orally once a day  Lasix 40 mg oral tablet: 1 tab(s) orally once a day as needed for  shortness of breath and/or wheezing take one pill daily as needed for swelling or shortness of breath  levoFLOXacin 750 mg oral tablet: 1 tab(s) orally once a day (at bedtime)  Metoprolol Succinate ER 50 mg oral tablet, extended release: 1 tab(s) orally once a day  pantoprazole 40 mg oral delayed release tablet: 1 tab(s) orally 2 times a day  polyethylene glycol 3350 oral powder for reconstitution: 17 gram(s) orally every 24 hours As needed Constipation  rosuvastatin 10 mg oral tablet: 1 tab(s) orally once a day (at bedtime)  senna leaf extract oral tablet: 2 tab(s) orally once a day (at bedtime) As needed Constipation  thiamine 100 mg oral tablet: 1 tab(s) orally every 24 hours

## 2024-06-07 NOTE — PROGRESS NOTE ADULT - PROBLEM SELECTOR PLAN 10
Improving/Largely resolved. Now AOx2-3.  AMS likely iso renal failure with uremia and electrolyte abnormalities. Infectious etiology possible although no leukocytosis, UA w/o nitrite, RVP negative, and Chest imaging w/o infiltrates. No report of fall or head trauma.   - tx hyperkalemia and renal failure as below  - f/u B12  - f/u TSH  - f/u RPR  - f/u Vitamin B1  - PT/OT  - nutrition consult f/u recs Resolved. Now AOx3.  AMS likely iso renal failure with uremia and electrolyte abnormalities. Infectious etiology possible although no leukocytosis, UA w/o nitrite, RVP negative, and Chest imaging w/o infiltrates. No report of fall or head trauma.   TSH WNL, B12 WNL, RPR negative,   - tx hyperkalemia and renal failure as below  - PT/OT  - nutrition consult f/u recs

## 2024-06-07 NOTE — PROGRESS NOTE ADULT - SUBJECTIVE AND OBJECTIVE BOX
Nephrology progress note    Seen at bedside. Patient is more awake and alert this morning, interactive. No complaints offered. Diuretics held overnight by primary team. sCr 2.5 today from 3. UOP 275cc recorded. -120 overnight. On 2L NC. Discussed with Palliative noted, plan for patient to transition to hospice care.    Allergies:  Aldactone (Rash)    Hospital Medications:   MEDICATIONS  (STANDING):  aspirin  chewable 81 milliGRAM(s) Oral daily  atorvastatin 40 milliGRAM(s) Oral at bedtime  cefTRIAXone Injectable. 1000 milliGRAM(s) IV Push every 24 hours  heparin   Injectable 5000 Unit(s) SubCutaneous every 8 hours  lidocaine   4% Patch 1 Patch Transdermal every 24 hours  pantoprazole    Tablet 40 milliGRAM(s) Oral every 12 hours  sodium zirconium cyclosilicate 10 Gram(s) Oral every 8 hours  thiamine 100 milliGRAM(s) Oral every 24 hours    REVIEW OF SYSTEMS:  All other review of systems is negative unless indicated above.    VITALS:  T(F): 97.5 (06-07-24 @ 08:59), Max: 98.1 (06-06-24 @ 14:50)  HR: 71 (06-07-24 @ 08:59)  BP: 103/62 (06-07-24 @ 08:59)  RR: 17 (06-07-24 @ 08:59)  SpO2: 100% (06-07-24 @ 08:59)  Wt(kg): --    06-05 @ 07:01  -  06-06 @ 07:00  --------------------------------------------------------  IN: 765 mL / OUT: 920 mL / NET: -155 mL    06-06 @ 07:01  -  06-07 @ 07:00  --------------------------------------------------------  IN: 300 mL / OUT: 275 mL / NET: 25 mL        PHYSICAL EXAM:  Constitutional: NAD, lying in bed  HEENT: NCAT, neck supple  Respiratory: CTAB, no w/r/r  Cardiac: Regular rate, no murmur  Gastrointestinal: abdomen soft, NT/ND  Genitourinary: mackenzie in place  Extremities: cool extremities, no peripheral edema  Dermatologic: no rashes on exposed skin  Neurologic: Awake, alert, interactive    LABS:  06-07    127<L>  |  94<L>  |  78<H>  ----------------------------<  78  4.0   |  20<L>  |  2.56<H>    Ca    8.7      07 Jun 2024 05:30  Phos  5.0     06-07  Mg     2.2     06-07    TPro  5.8<L>  /  Alb  3.0<L>  /  TBili  0.4  /  DBili      /  AST  18  /  ALT  23  /  AlkPhos  82  06-06                          8.0    4.84  )-----------( 274      ( 07 Jun 2024 05:30 )             24.2       Urine Studies:  Creatinine Trend: 2.56<--, 3.09<--, 3.06<--, 2.98<--, 2.93<--, 2.99<--  Urinalysis Basic - ( 07 Jun 2024 05:30 )    Color:  / Appearance:  / SG:  / pH:   Gluc: 78 mg/dL / Ketone:   / Bili:  / Urobili:    Blood:  / Protein:  / Nitrite:    Leuk Esterase:  / RBC:  / WBC    Sq Epi:  / Non Sq Epi:  / Bacteria:       Sodium, Random Urine: 24 mmol/L (06-05 @ 13:33)  Osmolality, Random Urine: 299 mosm/kg (06-05 @ 13:33)  Sodium, Random Urine: 38 mmol/L (06-05 @ 03:34)  Creatinine, Random Urine: 62 mg/dL (06-05 @ 03:34)  Protein/Creatinine Ratio Calculation: 1.6 Ratio (06-05 @ 03:34)  Osmolality, Random Urine: 301 mosm/kg (06-05 @ 03:34)  Potassium, Random Urine: 58 mmol/L (06-05 @ 03:34)    RADIOLOGY & ADDITIONAL STUDIES:  reviewed Nephrology progress note    Seen at bedside. Patient is more awake and alert this morning, interactive. No complaints offered. Diuretics held overnight by primary team. sCr 2.5 today from 3. UOP 275cc recorded. -120 overnight. On 2L NC. Discussed with Palliative noted, plan for patient to transition to hospice care per primary team.    Allergies:  Aldactone (Rash)    Hospital Medications:   MEDICATIONS  (STANDING):  aspirin  chewable 81 milliGRAM(s) Oral daily  atorvastatin 40 milliGRAM(s) Oral at bedtime  cefTRIAXone Injectable. 1000 milliGRAM(s) IV Push every 24 hours  heparin   Injectable 5000 Unit(s) SubCutaneous every 8 hours  lidocaine   4% Patch 1 Patch Transdermal every 24 hours  pantoprazole    Tablet 40 milliGRAM(s) Oral every 12 hours  sodium zirconium cyclosilicate 10 Gram(s) Oral every 8 hours  thiamine 100 milliGRAM(s) Oral every 24 hours    REVIEW OF SYSTEMS:  All other review of systems is negative unless indicated above.    VITALS:  T(F): 97.5 (06-07-24 @ 08:59), Max: 98.1 (06-06-24 @ 14:50)  HR: 71 (06-07-24 @ 08:59)  BP: 103/62 (06-07-24 @ 08:59)  RR: 17 (06-07-24 @ 08:59)  SpO2: 100% (06-07-24 @ 08:59)  Wt(kg): --    06-05 @ 07:01  -  06-06 @ 07:00  --------------------------------------------------------  IN: 765 mL / OUT: 920 mL / NET: -155 mL    06-06 @ 07:01  -  06-07 @ 07:00  --------------------------------------------------------  IN: 300 mL / OUT: 275 mL / NET: 25 mL        PHYSICAL EXAM:  Constitutional: NAD, lying in bed  HEENT: NCAT, neck supple  Respiratory: CTAB, no w/r/r  Cardiac: Regular rate, no murmur  Gastrointestinal: abdomen soft, NT/ND  Genitourinary: mackenzie in place  Extremities: cool extremities, no peripheral edema  Dermatologic: no rashes on exposed skin  Neurologic: Awake, alert, interactive    LABS:  06-07    127<L>  |  94<L>  |  78<H>  ----------------------------<  78  4.0   |  20<L>  |  2.56<H>    Ca    8.7      07 Jun 2024 05:30  Phos  5.0     06-07  Mg     2.2     06-07    TPro  5.8<L>  /  Alb  3.0<L>  /  TBili  0.4  /  DBili      /  AST  18  /  ALT  23  /  AlkPhos  82  06-06                          8.0    4.84  )-----------( 274      ( 07 Jun 2024 05:30 )             24.2       Urine Studies:  Creatinine Trend: 2.56<--, 3.09<--, 3.06<--, 2.98<--, 2.93<--, 2.99<--  Urinalysis Basic - ( 07 Jun 2024 05:30 )    Color:  / Appearance:  / SG:  / pH:   Gluc: 78 mg/dL / Ketone:   / Bili:  / Urobili:    Blood:  / Protein:  / Nitrite:    Leuk Esterase:  / RBC:  / WBC    Sq Epi:  / Non Sq Epi:  / Bacteria:       Sodium, Random Urine: 24 mmol/L (06-05 @ 13:33)  Osmolality, Random Urine: 299 mosm/kg (06-05 @ 13:33)  Sodium, Random Urine: 38 mmol/L (06-05 @ 03:34)  Creatinine, Random Urine: 62 mg/dL (06-05 @ 03:34)  Protein/Creatinine Ratio Calculation: 1.6 Ratio (06-05 @ 03:34)  Osmolality, Random Urine: 301 mosm/kg (06-05 @ 03:34)  Potassium, Random Urine: 58 mmol/L (06-05 @ 03:34)    RADIOLOGY & ADDITIONAL STUDIES:  reviewed

## 2024-06-07 NOTE — PROGRESS NOTE ADULT - PROBLEM SELECTOR PLAN 9
Not meeting SIRS criteria in the ED, Had mackenzie placed 2 wks ago at [facility] for obstructive uropathy, pt was discharged to [facility] with mackenzie, which was exchanged in ED. Initial UA sent had mod LE. Prior urine culture recently at [facility] growing Pansensitive E Coli.   s/p dose of cefepime in ED, monitor off antibiotics unless decompensate  May also be related to retaining, per [facility] this was related to her large stool burden at that time.   - repeat UA w/ Cx  - f/u blood cultures   - c CTX for now, can change based on Cx results. Previously discussed ertapenem but will hold off until culture results per ID Not meeting SIRS criteria in the ED, Had mackenzie placed 2 wks ago at [facility] for obstructive uropathy, pt was discharged to [facility] with mackenzie, which was exchanged in ED. Initial UA sent had mod LE. Prior urine culture recently at [facility] growing Pansensitive E Coli.   s/p dose of cefepime in ED, monitor off antibiotics unless decompensate  May also be related to retaining, per [facility] this was related to her large stool burden at that time.   - f/u blood cultures   - c CTX for now, can change based on Cx results. Previously discussed ertapenem but will hold off until culture results per ID Not meeting SIRS criteria in the ED, Had mackenzie placed 2 wks ago at [facility] for obstructive uropathy, pt was discharged to [facility] with mackenzie, which was exchanged in ED. Initial UA sent had mod LE. Prior urine culture recently at [facility] growing Pansensitive E Coli.   s/p dose of cefepime in ED, monitor off antibiotics unless decompensate  May also be related to retaining, per [facility] this was related to her large stool burden at that time.   - f/u blood cultures   - c CTX for now, culture results likely e. coli, can complete course 6/6-6/8.

## 2024-06-08 VITALS
HEART RATE: 77 BPM | SYSTOLIC BLOOD PRESSURE: 113 MMHG | OXYGEN SATURATION: 100 % | RESPIRATION RATE: 18 BRPM | DIASTOLIC BLOOD PRESSURE: 70 MMHG | TEMPERATURE: 98 F

## 2024-06-08 LAB
-  AMPICILLIN/SULBACTAM: SIGNIFICANT CHANGE UP
-  AMPICILLIN: SIGNIFICANT CHANGE UP
-  CEFAZOLIN: SIGNIFICANT CHANGE UP
-  CEFEPIME: SIGNIFICANT CHANGE UP
-  CEFTRIAXONE: SIGNIFICANT CHANGE UP
-  CEFUROXIME: SIGNIFICANT CHANGE UP
-  CIPROFLOXACIN: SIGNIFICANT CHANGE UP
-  ERTAPENEM: SIGNIFICANT CHANGE UP
-  GENTAMICIN: SIGNIFICANT CHANGE UP
-  IMIPENEM: SIGNIFICANT CHANGE UP
-  LEVOFLOXACIN: SIGNIFICANT CHANGE UP
-  MEROPENEM: SIGNIFICANT CHANGE UP
-  NITROFURANTOIN: SIGNIFICANT CHANGE UP
-  PIPERACILLIN/TAZOBACTAM: SIGNIFICANT CHANGE UP
-  TOBRAMYCIN: SIGNIFICANT CHANGE UP
-  TRIMETHOPRIM/SULFAMETHOXAZOLE: SIGNIFICANT CHANGE UP
ANION GAP SERPL CALC-SCNC: 15 MMOL/L — SIGNIFICANT CHANGE UP (ref 5–17)
BUN SERPL-MCNC: 73 MG/DL — HIGH (ref 7–23)
CALCIUM SERPL-MCNC: 8.6 MG/DL — SIGNIFICANT CHANGE UP (ref 8.4–10.5)
CHLORIDE SERPL-SCNC: 91 MMOL/L — LOW (ref 96–108)
CO2 SERPL-SCNC: 20 MMOL/L — LOW (ref 22–31)
CREAT SERPL-MCNC: 1.99 MG/DL — HIGH (ref 0.5–1.3)
CULTURE RESULTS: ABNORMAL
EGFR: 23 ML/MIN/1.73M2 — LOW
GLUCOSE BLDC GLUCOMTR-MCNC: 110 MG/DL — HIGH (ref 70–99)
GLUCOSE BLDC GLUCOMTR-MCNC: 119 MG/DL — HIGH (ref 70–99)
GLUCOSE BLDC GLUCOMTR-MCNC: 81 MG/DL — SIGNIFICANT CHANGE UP (ref 70–99)
GLUCOSE SERPL-MCNC: 81 MG/DL — SIGNIFICANT CHANGE UP (ref 70–99)
HCT VFR BLD CALC: 28.3 % — LOW (ref 34.5–45)
HGB BLD-MCNC: 9.1 G/DL — LOW (ref 11.5–15.5)
MAGNESIUM SERPL-MCNC: 2 MG/DL — SIGNIFICANT CHANGE UP (ref 1.6–2.6)
MCHC RBC-ENTMCNC: 28.1 PG — SIGNIFICANT CHANGE UP (ref 27–34)
MCHC RBC-ENTMCNC: 32.2 GM/DL — SIGNIFICANT CHANGE UP (ref 32–36)
MCV RBC AUTO: 87.3 FL — SIGNIFICANT CHANGE UP (ref 80–100)
METHOD TYPE: SIGNIFICANT CHANGE UP
NRBC # BLD: 0 /100 WBCS — SIGNIFICANT CHANGE UP (ref 0–0)
ORGANISM # SPEC MICROSCOPIC CNT: ABNORMAL
ORGANISM # SPEC MICROSCOPIC CNT: SIGNIFICANT CHANGE UP
PHOSPHATE SERPL-MCNC: 4.3 MG/DL — SIGNIFICANT CHANGE UP (ref 2.5–4.5)
PLATELET # BLD AUTO: 295 K/UL — SIGNIFICANT CHANGE UP (ref 150–400)
POTASSIUM SERPL-MCNC: 4 MMOL/L — SIGNIFICANT CHANGE UP (ref 3.5–5.3)
POTASSIUM SERPL-SCNC: 4 MMOL/L — SIGNIFICANT CHANGE UP (ref 3.5–5.3)
RBC # BLD: 3.24 M/UL — LOW (ref 3.8–5.2)
RBC # FLD: 14.6 % — HIGH (ref 10.3–14.5)
SODIUM SERPL-SCNC: 126 MMOL/L — LOW (ref 135–145)
SPECIMEN SOURCE: SIGNIFICANT CHANGE UP
WBC # BLD: 5.23 K/UL — SIGNIFICANT CHANGE UP (ref 3.8–10.5)
WBC # FLD AUTO: 5.23 K/UL — SIGNIFICANT CHANGE UP (ref 3.8–10.5)

## 2024-06-08 PROCEDURE — 82607 VITAMIN B-12: CPT

## 2024-06-08 PROCEDURE — 83540 ASSAY OF IRON: CPT

## 2024-06-08 PROCEDURE — 87186 SC STD MICRODIL/AGAR DIL: CPT

## 2024-06-08 PROCEDURE — 82803 BLOOD GASES ANY COMBINATION: CPT

## 2024-06-08 PROCEDURE — 83880 ASSAY OF NATRIURETIC PEPTIDE: CPT

## 2024-06-08 PROCEDURE — 82962 GLUCOSE BLOOD TEST: CPT

## 2024-06-08 PROCEDURE — 99291 CRITICAL CARE FIRST HOUR: CPT | Mod: 25

## 2024-06-08 PROCEDURE — 86850 RBC ANTIBODY SCREEN: CPT

## 2024-06-08 PROCEDURE — 84133 ASSAY OF URINE POTASSIUM: CPT

## 2024-06-08 PROCEDURE — 70450 CT HEAD/BRAIN W/O DYE: CPT | Mod: MC

## 2024-06-08 PROCEDURE — 80053 COMPREHEN METABOLIC PANEL: CPT

## 2024-06-08 PROCEDURE — 86780 TREPONEMA PALLIDUM: CPT

## 2024-06-08 PROCEDURE — 93971 EXTREMITY STUDY: CPT

## 2024-06-08 PROCEDURE — 85025 COMPLETE CBC W/AUTO DIFF WBC: CPT

## 2024-06-08 PROCEDURE — 36415 COLL VENOUS BLD VENIPUNCTURE: CPT

## 2024-06-08 PROCEDURE — 86923 COMPATIBILITY TEST ELECTRIC: CPT

## 2024-06-08 PROCEDURE — 83550 IRON BINDING TEST: CPT

## 2024-06-08 PROCEDURE — 80048 BASIC METABOLIC PNL TOTAL CA: CPT

## 2024-06-08 PROCEDURE — 87086 URINE CULTURE/COLONY COUNT: CPT

## 2024-06-08 PROCEDURE — C8929: CPT

## 2024-06-08 PROCEDURE — 97161 PT EVAL LOW COMPLEX 20 MIN: CPT

## 2024-06-08 PROCEDURE — 83036 HEMOGLOBIN GLYCOSYLATED A1C: CPT

## 2024-06-08 PROCEDURE — 87077 CULTURE AEROBIC IDENTIFY: CPT

## 2024-06-08 PROCEDURE — 84145 PROCALCITONIN (PCT): CPT

## 2024-06-08 PROCEDURE — 94640 AIRWAY INHALATION TREATMENT: CPT

## 2024-06-08 PROCEDURE — 84449 ASSAY OF TRANSCORTIN: CPT

## 2024-06-08 PROCEDURE — 87389 HIV-1 AG W/HIV-1&-2 AB AG IA: CPT

## 2024-06-08 PROCEDURE — 84540 ASSAY OF URINE/UREA-N: CPT

## 2024-06-08 PROCEDURE — 82728 ASSAY OF FERRITIN: CPT

## 2024-06-08 PROCEDURE — 82746 ASSAY OF FOLIC ACID SERUM: CPT

## 2024-06-08 PROCEDURE — 93312 ECHO TRANSESOPHAGEAL: CPT

## 2024-06-08 PROCEDURE — 36430 TRANSFUSION BLD/BLD COMPNT: CPT

## 2024-06-08 PROCEDURE — 83930 ASSAY OF BLOOD OSMOLALITY: CPT

## 2024-06-08 PROCEDURE — 84466 ASSAY OF TRANSFERRIN: CPT

## 2024-06-08 PROCEDURE — 82272 OCCULT BLD FECES 1-3 TESTS: CPT

## 2024-06-08 PROCEDURE — 82550 ASSAY OF CK (CPK): CPT

## 2024-06-08 PROCEDURE — 84156 ASSAY OF PROTEIN URINE: CPT

## 2024-06-08 PROCEDURE — 71045 X-RAY EXAM CHEST 1 VIEW: CPT

## 2024-06-08 PROCEDURE — 85610 PROTHROMBIN TIME: CPT

## 2024-06-08 PROCEDURE — 84443 ASSAY THYROID STIM HORMONE: CPT

## 2024-06-08 PROCEDURE — 85027 COMPLETE CBC AUTOMATED: CPT

## 2024-06-08 PROCEDURE — 82533 TOTAL CORTISOL: CPT

## 2024-06-08 PROCEDURE — 96375 TX/PRO/DX INJ NEW DRUG ADDON: CPT

## 2024-06-08 PROCEDURE — 74176 CT ABD & PELVIS W/O CONTRAST: CPT | Mod: MC

## 2024-06-08 PROCEDURE — 84300 ASSAY OF URINE SODIUM: CPT

## 2024-06-08 PROCEDURE — 83605 ASSAY OF LACTIC ACID: CPT

## 2024-06-08 PROCEDURE — 83935 ASSAY OF URINE OSMOLALITY: CPT

## 2024-06-08 PROCEDURE — 82553 CREATINE MB FRACTION: CPT

## 2024-06-08 PROCEDURE — 83010 ASSAY OF HAPTOGLOBIN QUANT: CPT

## 2024-06-08 PROCEDURE — 81001 URINALYSIS AUTO W/SCOPE: CPT

## 2024-06-08 PROCEDURE — 84100 ASSAY OF PHOSPHORUS: CPT

## 2024-06-08 PROCEDURE — 76775 US EXAM ABDO BACK WALL LIM: CPT

## 2024-06-08 PROCEDURE — 85045 AUTOMATED RETICULOCYTE COUNT: CPT

## 2024-06-08 PROCEDURE — P9016: CPT

## 2024-06-08 PROCEDURE — 82570 ASSAY OF URINE CREATININE: CPT

## 2024-06-08 PROCEDURE — 93005 ELECTROCARDIOGRAM TRACING: CPT

## 2024-06-08 PROCEDURE — 83735 ASSAY OF MAGNESIUM: CPT

## 2024-06-08 PROCEDURE — 83615 LACTATE (LD) (LDH) ENZYME: CPT

## 2024-06-08 PROCEDURE — 0225U NFCT DS DNA&RNA 21 SARSCOV2: CPT

## 2024-06-08 PROCEDURE — 86900 BLOOD TYPING SEROLOGIC ABO: CPT

## 2024-06-08 PROCEDURE — 87040 BLOOD CULTURE FOR BACTERIA: CPT

## 2024-06-08 PROCEDURE — 86901 BLOOD TYPING SEROLOGIC RH(D): CPT

## 2024-06-08 PROCEDURE — 80061 LIPID PANEL: CPT

## 2024-06-08 PROCEDURE — 83690 ASSAY OF LIPASE: CPT

## 2024-06-08 PROCEDURE — 76856 US EXAM PELVIC COMPLETE: CPT

## 2024-06-08 PROCEDURE — 99239 HOSP IP/OBS DSCHRG MGMT >30: CPT | Mod: GC

## 2024-06-08 PROCEDURE — 96365 THER/PROPH/DIAG IV INF INIT: CPT

## 2024-06-08 PROCEDURE — 84425 ASSAY OF VITAMIN B-1: CPT

## 2024-06-08 PROCEDURE — 85730 THROMBOPLASTIN TIME PARTIAL: CPT

## 2024-06-08 PROCEDURE — 96367 TX/PROPH/DG ADDL SEQ IV INF: CPT

## 2024-06-08 PROCEDURE — 84484 ASSAY OF TROPONIN QUANT: CPT

## 2024-06-08 RX ORDER — LEVOFLOXACIN 5 MG/ML
1 INJECTION, SOLUTION INTRAVENOUS
Qty: 5 | Refills: 0
Start: 2024-06-08 | End: 2024-06-12

## 2024-06-08 RX ORDER — OXYCODONE HYDROCHLORIDE 5 MG/1
1 TABLET ORAL
Qty: 8 | Refills: 0
Start: 2024-06-08 | End: 2024-06-09

## 2024-06-08 RX ORDER — OXYCODONE HYDROCHLORIDE 5 MG/1
1 TABLET ORAL
Qty: 30 | Refills: 0
Start: 2024-06-08 | End: 2024-06-12

## 2024-06-08 RX ORDER — OXYCODONE HYDROCHLORIDE 5 MG/1
1 TABLET ORAL
Refills: 0
Start: 2024-06-08

## 2024-06-08 RX ORDER — OXYCODONE HYDROCHLORIDE 5 MG/1
1 TABLET ORAL
Qty: 10 | Refills: 0
Start: 2024-06-08

## 2024-06-08 RX ORDER — CEFTRIAXONE 500 MG/1
1000 INJECTION, POWDER, FOR SOLUTION INTRAMUSCULAR; INTRAVENOUS ONCE
Refills: 0 | Status: DISCONTINUED | OUTPATIENT
Start: 2024-06-08 | End: 2024-06-08

## 2024-06-08 RX ORDER — ACETAMINOPHEN 500 MG
2 TABLET ORAL
Qty: 42 | Refills: 0
Start: 2024-06-08 | End: 2024-06-14

## 2024-06-08 RX ADMIN — Medication 100 MILLIGRAM(S): at 07:56

## 2024-06-08 RX ADMIN — LIDOCAINE 1 PATCH: 4 CREAM TOPICAL at 01:31

## 2024-06-08 RX ADMIN — HEPARIN SODIUM 5000 UNIT(S): 5000 INJECTION INTRAVENOUS; SUBCUTANEOUS at 06:31

## 2024-06-08 RX ADMIN — LIDOCAINE 1 PATCH: 4 CREAM TOPICAL at 10:59

## 2024-06-08 RX ADMIN — OXYCODONE HYDROCHLORIDE 5 MILLIGRAM(S): 5 TABLET ORAL at 07:20

## 2024-06-08 RX ADMIN — Medication 25 MILLIGRAM(S): at 11:25

## 2024-06-08 RX ADMIN — OXYCODONE HYDROCHLORIDE 5 MILLIGRAM(S): 5 TABLET ORAL at 06:29

## 2024-06-08 RX ADMIN — OXYCODONE HYDROCHLORIDE 5 MILLIGRAM(S): 5 TABLET ORAL at 12:36

## 2024-06-08 RX ADMIN — SODIUM ZIRCONIUM CYCLOSILICATE 10 GRAM(S): 10 POWDER, FOR SUSPENSION ORAL at 06:30

## 2024-06-08 RX ADMIN — LIDOCAINE 1 PATCH: 4 CREAM TOPICAL at 07:20

## 2024-06-08 RX ADMIN — Medication 81 MILLIGRAM(S): at 10:59

## 2024-06-08 RX ADMIN — PANTOPRAZOLE SODIUM 40 MILLIGRAM(S): 20 TABLET, DELAYED RELEASE ORAL at 06:30

## 2024-06-08 RX ADMIN — LIDOCAINE 1 PATCH: 4 CREAM TOPICAL at 06:30

## 2024-06-08 NOTE — CHART NOTE - NSCHARTNOTEFT_GEN_A_CORE
Nephrology following for non-oliguric TODD on CKD3 likely due to poor renal perfusion i/s/o CHF  Cr improving to 1.99 today. VS stable  Continue current management  Pt proceeding with home hospice  Refer to nephrology progress note from 6/7 for symptom management with kidney failure in case of comfort care  Nephrology will sign off. Please reach out for any questions/concerns
Given worsening pain since 5/17/24 and concerns for inadequate pain control through the nerve stimulator, patient’s daughter Karena Hogan (Imani) requested that a representative from the nerve stimulator company, [employer], come in and help assess the stimulator. Imani contacted Cirilo, the representative from [employer], and arranged for an assessment in [facility] at 3PM.  Cirilo arrived around 4:30 PM and saw patient at bedside with daughter Imani also at bedside. He assessed battery life and ensured that the stimulator was still working. Since patient lost her remote in Feb 2024, she has not been able to select different levels on the stimulator, therefore Cirilo was unsure whether or not the current regimen has been working for her. He increased the current regimen up by one level and ordered a new remote for patient.

## 2024-06-09 DIAGNOSIS — N39.0 URINARY TRACT INFECTION, SITE NOT SPECIFIED: ICD-10-CM

## 2024-06-09 RX ORDER — LEVOFLOXACIN 750 MG/1
750 TABLET, FILM COATED ORAL
Refills: 0 | Status: ACTIVE | COMMUNITY
Start: 2024-06-09

## 2024-06-09 RX ORDER — FUROSEMIDE 40 MG/1
40 TABLET ORAL
Qty: 90 | Refills: 0 | Status: ACTIVE | COMMUNITY
Start: 2024-06-09 | End: 1900-01-01

## 2024-06-10 LAB
CULTURE RESULTS: SIGNIFICANT CHANGE UP
CULTURE RESULTS: SIGNIFICANT CHANGE UP
SPECIMEN SOURCE: SIGNIFICANT CHANGE UP
SPECIMEN SOURCE: SIGNIFICANT CHANGE UP

## 2024-06-11 LAB — VIT B1 SERPL-MCNC: 113.6 NMOL/L — SIGNIFICANT CHANGE UP (ref 66.5–200)

## 2024-06-12 ENCOUNTER — APPOINTMENT (OUTPATIENT)
Dept: HOME HEALTH SERVICES | Facility: HOME HEALTH | Age: 89
End: 2024-06-12

## 2024-06-14 LAB
CORTICOSTEROID BINDING GLOBULIN RESULT: 1.2 MG/DL — LOW
CORTIS F/TOTAL MFR SERPL: 8.7 % — SIGNIFICANT CHANGE UP
CORTIS SERPL-MCNC: 4.9 UG/DL — SIGNIFICANT CHANGE UP
CORTISOL, FREE RESULT: 0.43 UG/DL — SIGNIFICANT CHANGE UP

## 2024-06-17 DIAGNOSIS — M21.372 FOOT DROP, LEFT FOOT: ICD-10-CM

## 2024-06-17 DIAGNOSIS — N18.30 CHRONIC KIDNEY DISEASE, STAGE 3 UNSPECIFIED: ICD-10-CM

## 2024-06-17 DIAGNOSIS — N39.0 URINARY TRACT INFECTION, SITE NOT SPECIFIED: ICD-10-CM

## 2024-06-17 DIAGNOSIS — N17.0 ACUTE KIDNEY FAILURE WITH TUBULAR NECROSIS: ICD-10-CM

## 2024-06-17 DIAGNOSIS — E87.5 HYPERKALEMIA: ICD-10-CM

## 2024-06-17 DIAGNOSIS — I25.10 ATHEROSCLEROTIC HEART DISEASE OF NATIVE CORONARY ARTERY WITHOUT ANGINA PECTORIS: ICD-10-CM

## 2024-06-17 DIAGNOSIS — I48.20 CHRONIC ATRIAL FIBRILLATION, UNSPECIFIED: ICD-10-CM

## 2024-06-17 DIAGNOSIS — I21.A1 MYOCARDIAL INFARCTION TYPE 2: ICD-10-CM

## 2024-06-17 DIAGNOSIS — I13.0 HYPERTENSIVE HEART AND CHRONIC KIDNEY DISEASE WITH HEART FAILURE AND STAGE 1 THROUGH STAGE 4 CHRONIC KIDNEY DISEASE, OR UNSPECIFIED CHRONIC KIDNEY DISEASE: ICD-10-CM

## 2024-06-17 DIAGNOSIS — E22.2 SYNDROME OF INAPPROPRIATE SECRETION OF ANTIDIURETIC HORMONE: ICD-10-CM

## 2024-06-17 DIAGNOSIS — Z51.5 ENCOUNTER FOR PALLIATIVE CARE: ICD-10-CM

## 2024-06-17 DIAGNOSIS — E87.20 ACIDOSIS, UNSPECIFIED: ICD-10-CM

## 2024-06-17 DIAGNOSIS — M54.50 LOW BACK PAIN, UNSPECIFIED: ICD-10-CM

## 2024-06-17 DIAGNOSIS — E78.5 HYPERLIPIDEMIA, UNSPECIFIED: ICD-10-CM

## 2024-06-17 DIAGNOSIS — Z85.3 PERSONAL HISTORY OF MALIGNANT NEOPLASM OF BREAST: ICD-10-CM

## 2024-06-17 DIAGNOSIS — D63.1 ANEMIA IN CHRONIC KIDNEY DISEASE: ICD-10-CM

## 2024-06-17 DIAGNOSIS — Z96.82 PRESENCE OF NEUROSTIMULATOR: ICD-10-CM

## 2024-06-17 DIAGNOSIS — Z88.8 ALLERGY STATUS TO OTHER DRUGS, MEDICAMENTS AND BIOLOGICAL SUBSTANCES: ICD-10-CM

## 2024-06-17 DIAGNOSIS — Z66 DO NOT RESUSCITATE: ICD-10-CM

## 2024-06-17 DIAGNOSIS — G93.41 METABOLIC ENCEPHALOPATHY: ICD-10-CM

## 2024-06-17 DIAGNOSIS — G89.29 OTHER CHRONIC PAIN: ICD-10-CM

## 2024-06-17 DIAGNOSIS — I25.5 ISCHEMIC CARDIOMYOPATHY: ICD-10-CM

## 2024-06-17 DIAGNOSIS — I50.23 ACUTE ON CHRONIC SYSTOLIC (CONGESTIVE) HEART FAILURE: ICD-10-CM

## 2024-07-01 RX ORDER — FUROSEMIDE 40 MG
1 TABLET ORAL
Refills: 0 | DISCHARGE

## 2024-07-01 RX ORDER — PANTOPRAZOLE SODIUM 20 MG/1
1 TABLET, DELAYED RELEASE ORAL
Refills: 0 | DISCHARGE

## 2024-07-01 RX ORDER — ROSUVASTATIN CALCIUM 5 MG/1
1 TABLET ORAL
Refills: 0 | DISCHARGE

## 2024-07-01 RX ORDER — METOPROLOL TARTRATE 50 MG
1 TABLET ORAL
Refills: 0 | DISCHARGE

## 2024-07-01 RX ORDER — ASPIRIN/CALCIUM CARB/MAGNESIUM 324 MG
1 TABLET ORAL
Refills: 0 | DISCHARGE

## 2024-07-04 ENCOUNTER — INPATIENT (INPATIENT)
Facility: HOSPITAL | Age: 89
LOS: 0 days | Discharge: HOME HEALTH | End: 2024-07-05
Attending: HOSPITALIST | Admitting: STUDENT IN AN ORGANIZED HEALTH CARE EDUCATION/TRAINING PROGRAM
Payer: MEDICARE

## 2024-07-04 VITALS
WEIGHT: 149.91 LBS | RESPIRATION RATE: 18 BRPM | HEIGHT: 65 IN | SYSTOLIC BLOOD PRESSURE: 100 MMHG | OXYGEN SATURATION: 97 % | TEMPERATURE: 99 F | HEART RATE: 77 BPM | DIASTOLIC BLOOD PRESSURE: 61 MMHG

## 2024-07-04 DIAGNOSIS — M54.50 LOW BACK PAIN, UNSPECIFIED: ICD-10-CM

## 2024-07-04 DIAGNOSIS — D64.9 ANEMIA, UNSPECIFIED: ICD-10-CM

## 2024-07-04 DIAGNOSIS — I48.20 CHRONIC ATRIAL FIBRILLATION, UNSPECIFIED: ICD-10-CM

## 2024-07-04 DIAGNOSIS — N17.9 ACUTE KIDNEY FAILURE, UNSPECIFIED: ICD-10-CM

## 2024-07-04 DIAGNOSIS — Z98.890 OTHER SPECIFIED POSTPROCEDURAL STATES: Chronic | ICD-10-CM

## 2024-07-04 DIAGNOSIS — K92.2 GASTROINTESTINAL HEMORRHAGE, UNSPECIFIED: ICD-10-CM

## 2024-07-04 DIAGNOSIS — Z29.9 ENCOUNTER FOR PROPHYLACTIC MEASURES, UNSPECIFIED: ICD-10-CM

## 2024-07-04 DIAGNOSIS — I50.22 CHRONIC SYSTOLIC (CONGESTIVE) HEART FAILURE: ICD-10-CM

## 2024-07-04 DIAGNOSIS — R53.2 FUNCTIONAL QUADRIPLEGIA: ICD-10-CM

## 2024-07-04 LAB
ANION GAP SERPL CALC-SCNC: 8 MMOL/L — SIGNIFICANT CHANGE UP (ref 5–17)
APTT BLD: 31.8 SEC — SIGNIFICANT CHANGE UP (ref 24.5–35.6)
BASOPHILS # BLD AUTO: 0.02 K/UL — SIGNIFICANT CHANGE UP (ref 0–0.2)
BASOPHILS NFR BLD AUTO: 0.3 % — SIGNIFICANT CHANGE UP (ref 0–2)
BLD GP AB SCN SERPL QL: NEGATIVE — SIGNIFICANT CHANGE UP
BUN SERPL-MCNC: 69 MG/DL — HIGH (ref 7–23)
CALCIUM SERPL-MCNC: 8.8 MG/DL — SIGNIFICANT CHANGE UP (ref 8.4–10.5)
CHLORIDE SERPL-SCNC: 101 MMOL/L — SIGNIFICANT CHANGE UP (ref 96–108)
CO2 SERPL-SCNC: 29 MMOL/L — SIGNIFICANT CHANGE UP (ref 22–31)
CREAT SERPL-MCNC: 1.7 MG/DL — HIGH (ref 0.5–1.3)
EGFR: 28 ML/MIN/1.73M2 — LOW
EOSINOPHIL # BLD AUTO: 0.22 K/UL — SIGNIFICANT CHANGE UP (ref 0–0.5)
EOSINOPHIL NFR BLD AUTO: 3 % — SIGNIFICANT CHANGE UP (ref 0–6)
GLUCOSE SERPL-MCNC: 130 MG/DL — HIGH (ref 70–99)
HCT VFR BLD CALC: 27.7 % — LOW (ref 34.5–45)
HGB BLD-MCNC: 9.1 G/DL — LOW (ref 11.5–15.5)
IMM GRANULOCYTES NFR BLD AUTO: 0.3 % — SIGNIFICANT CHANGE UP (ref 0–0.9)
INR BLD: 1.02 — SIGNIFICANT CHANGE UP (ref 0.85–1.18)
LYMPHOCYTES # BLD AUTO: 0.89 K/UL — LOW (ref 1–3.3)
LYMPHOCYTES # BLD AUTO: 12.3 % — LOW (ref 13–44)
MCHC RBC-ENTMCNC: 27.3 PG — SIGNIFICANT CHANGE UP (ref 27–34)
MCHC RBC-ENTMCNC: 32.9 GM/DL — SIGNIFICANT CHANGE UP (ref 32–36)
MCV RBC AUTO: 83.2 FL — SIGNIFICANT CHANGE UP (ref 80–100)
MONOCYTES # BLD AUTO: 0.6 K/UL — SIGNIFICANT CHANGE UP (ref 0–0.9)
MONOCYTES NFR BLD AUTO: 8.3 % — SIGNIFICANT CHANGE UP (ref 2–14)
NEUTROPHILS # BLD AUTO: 5.48 K/UL — SIGNIFICANT CHANGE UP (ref 1.8–7.4)
NEUTROPHILS NFR BLD AUTO: 75.8 % — SIGNIFICANT CHANGE UP (ref 43–77)
NRBC # BLD: 0 /100 WBCS — SIGNIFICANT CHANGE UP (ref 0–0)
OB PNL STL: POSITIVE
PLATELET # BLD AUTO: 254 K/UL — SIGNIFICANT CHANGE UP (ref 150–400)
POTASSIUM SERPL-MCNC: 4.4 MMOL/L — SIGNIFICANT CHANGE UP (ref 3.5–5.3)
POTASSIUM SERPL-SCNC: 4.4 MMOL/L — SIGNIFICANT CHANGE UP (ref 3.5–5.3)
PROTHROM AB SERPL-ACNC: 11.6 SEC — SIGNIFICANT CHANGE UP (ref 9.5–13)
RBC # BLD: 3.33 M/UL — LOW (ref 3.8–5.2)
RBC # FLD: 16.9 % — HIGH (ref 10.3–14.5)
RH IG SCN BLD-IMP: POSITIVE — SIGNIFICANT CHANGE UP
SODIUM SERPL-SCNC: 138 MMOL/L — SIGNIFICANT CHANGE UP (ref 135–145)
WBC # BLD: 7.23 K/UL — SIGNIFICANT CHANGE UP (ref 3.8–10.5)
WBC # FLD AUTO: 7.23 K/UL — SIGNIFICANT CHANGE UP (ref 3.8–10.5)

## 2024-07-04 PROCEDURE — 99285 EMERGENCY DEPT VISIT HI MDM: CPT

## 2024-07-04 PROCEDURE — 93010 ELECTROCARDIOGRAM REPORT: CPT

## 2024-07-04 PROCEDURE — 99223 1ST HOSP IP/OBS HIGH 75: CPT | Mod: GC

## 2024-07-04 RX ORDER — LIDOCAINE HCL 28 MG/G
1 GEL TOPICAL EVERY 24 HOURS
Refills: 0 | Status: DISCONTINUED | OUTPATIENT
Start: 2024-07-04 | End: 2024-07-05

## 2024-07-04 RX ORDER — ASPIRIN 325 MG/1
81 TABLET, FILM COATED ORAL EVERY 24 HOURS
Refills: 0 | Status: DISCONTINUED | OUTPATIENT
Start: 2024-07-05 | End: 2024-07-05

## 2024-07-04 RX ORDER — PANTOPRAZOLE SODIUM 40 MG/10ML
40 INJECTION, POWDER, FOR SOLUTION INTRAVENOUS
Refills: 0 | Status: DISCONTINUED | OUTPATIENT
Start: 2024-07-04 | End: 2024-07-05

## 2024-07-04 RX ORDER — ACETAMINOPHEN 325 MG
650 TABLET ORAL EVERY 6 HOURS
Refills: 0 | Status: DISCONTINUED | OUTPATIENT
Start: 2024-07-04 | End: 2024-07-05

## 2024-07-04 RX ORDER — SENNOSIDES 8.6 MG
2 TABLET ORAL AT BEDTIME
Refills: 0 | Status: DISCONTINUED | OUTPATIENT
Start: 2024-07-04 | End: 2024-07-05

## 2024-07-04 RX ORDER — PANTOPRAZOLE SODIUM 40 MG/10ML
80 INJECTION, POWDER, FOR SOLUTION INTRAVENOUS ONCE
Refills: 0 | Status: COMPLETED | OUTPATIENT
Start: 2024-07-04 | End: 2024-07-04

## 2024-07-04 RX ADMIN — LIDOCAINE HCL 1 PATCH: 28 GEL TOPICAL at 18:47

## 2024-07-04 RX ADMIN — Medication 650 MILLIGRAM(S): at 21:03

## 2024-07-04 RX ADMIN — LIDOCAINE HCL 1 PATCH: 28 GEL TOPICAL at 17:48

## 2024-07-04 RX ADMIN — PANTOPRAZOLE SODIUM 80 MILLIGRAM(S): 40 INJECTION, POWDER, FOR SOLUTION INTRAVENOUS at 14:52

## 2024-07-04 RX ADMIN — Medication 650 MILLIGRAM(S): at 22:03

## 2024-07-05 ENCOUNTER — TRANSCRIPTION ENCOUNTER (OUTPATIENT)
Age: 89
End: 2024-07-05

## 2024-07-05 VITALS
HEART RATE: 76 BPM | DIASTOLIC BLOOD PRESSURE: 58 MMHG | SYSTOLIC BLOOD PRESSURE: 109 MMHG | OXYGEN SATURATION: 96 % | RESPIRATION RATE: 18 BRPM | TEMPERATURE: 97 F

## 2024-07-05 DIAGNOSIS — Z71.89 OTHER SPECIFIED COUNSELING: ICD-10-CM

## 2024-07-05 DIAGNOSIS — Z51.5 ENCOUNTER FOR PALLIATIVE CARE: ICD-10-CM

## 2024-07-05 DIAGNOSIS — N18.9 CHRONIC KIDNEY DISEASE, UNSPECIFIED: ICD-10-CM

## 2024-07-05 LAB
ANION GAP SERPL CALC-SCNC: 9 MMOL/L — SIGNIFICANT CHANGE UP (ref 5–17)
APPEARANCE UR: CLEAR — SIGNIFICANT CHANGE UP
BACTERIA # UR AUTO: ABNORMAL /HPF
BILIRUB UR-MCNC: NEGATIVE — SIGNIFICANT CHANGE UP
BUN SERPL-MCNC: 67 MG/DL — HIGH (ref 7–23)
CALCIUM SERPL-MCNC: 8.8 MG/DL — SIGNIFICANT CHANGE UP (ref 8.4–10.5)
CAST: 0 /LPF — SIGNIFICANT CHANGE UP (ref 0–4)
CHLORIDE SERPL-SCNC: 102 MMOL/L — SIGNIFICANT CHANGE UP (ref 96–108)
CO2 SERPL-SCNC: 28 MMOL/L — SIGNIFICANT CHANGE UP (ref 22–31)
COLOR SPEC: YELLOW — SIGNIFICANT CHANGE UP
CREAT SERPL-MCNC: 1.85 MG/DL — HIGH (ref 0.5–1.3)
DIFF PNL FLD: NEGATIVE — SIGNIFICANT CHANGE UP
EGFR: 25 ML/MIN/1.73M2 — LOW
GLUCOSE SERPL-MCNC: 97 MG/DL — SIGNIFICANT CHANGE UP (ref 70–99)
GLUCOSE UR QL: NEGATIVE MG/DL — SIGNIFICANT CHANGE UP
HCT VFR BLD CALC: 29.1 % — LOW (ref 34.5–45)
HGB BLD-MCNC: 8.9 G/DL — LOW (ref 11.5–15.5)
KETONES UR-MCNC: NEGATIVE MG/DL — SIGNIFICANT CHANGE UP
LEUKOCYTE ESTERASE UR-ACNC: ABNORMAL
MAGNESIUM SERPL-MCNC: 2.1 MG/DL — SIGNIFICANT CHANGE UP (ref 1.6–2.6)
MCHC RBC-ENTMCNC: 26.6 PG — LOW (ref 27–34)
MCHC RBC-ENTMCNC: 30.6 GM/DL — LOW (ref 32–36)
MCV RBC AUTO: 87.1 FL — SIGNIFICANT CHANGE UP (ref 80–100)
MUCOUS THREADS # UR AUTO: PRESENT
NITRITE UR-MCNC: NEGATIVE — SIGNIFICANT CHANGE UP
NRBC # BLD: 0 /100 WBCS — SIGNIFICANT CHANGE UP (ref 0–0)
PH UR: 7.5 — SIGNIFICANT CHANGE UP (ref 5–8)
PHOSPHATE SERPL-MCNC: 3.8 MG/DL — SIGNIFICANT CHANGE UP (ref 2.5–4.5)
PLATELET # BLD AUTO: 218 K/UL — SIGNIFICANT CHANGE UP (ref 150–400)
POTASSIUM SERPL-MCNC: 4.6 MMOL/L — SIGNIFICANT CHANGE UP (ref 3.5–5.3)
POTASSIUM SERPL-SCNC: 4.6 MMOL/L — SIGNIFICANT CHANGE UP (ref 3.5–5.3)
PROT UR-MCNC: NEGATIVE MG/DL — SIGNIFICANT CHANGE UP
RBC # BLD: 3.34 M/UL — LOW (ref 3.8–5.2)
RBC # FLD: 16.8 % — HIGH (ref 10.3–14.5)
RBC CASTS # UR COMP ASSIST: 1 /HPF — SIGNIFICANT CHANGE UP (ref 0–4)
SODIUM SERPL-SCNC: 139 MMOL/L — SIGNIFICANT CHANGE UP (ref 135–145)
SP GR SPEC: 1.01 — SIGNIFICANT CHANGE UP (ref 1–1.03)
SQUAMOUS # UR AUTO: 1 /HPF — SIGNIFICANT CHANGE UP (ref 0–5)
UROBILINOGEN FLD QL: 1 MG/DL — SIGNIFICANT CHANGE UP (ref 0.2–1)
WBC # BLD: 6.31 K/UL — SIGNIFICANT CHANGE UP (ref 3.8–10.5)
WBC # FLD AUTO: 6.31 K/UL — SIGNIFICANT CHANGE UP (ref 3.8–10.5)
WBC UR QL: 41 /HPF — HIGH (ref 0–5)
YEAST-LIKE CELLS: PRESENT

## 2024-07-05 PROCEDURE — 99285 EMERGENCY DEPT VISIT HI MDM: CPT | Mod: 25

## 2024-07-05 PROCEDURE — 99239 HOSP IP/OBS DSCHRG MGMT >30: CPT | Mod: GC

## 2024-07-05 PROCEDURE — 87186 SC STD MICRODIL/AGAR DIL: CPT

## 2024-07-05 PROCEDURE — 82272 OCCULT BLD FECES 1-3 TESTS: CPT

## 2024-07-05 PROCEDURE — 86850 RBC ANTIBODY SCREEN: CPT

## 2024-07-05 PROCEDURE — 85027 COMPLETE CBC AUTOMATED: CPT

## 2024-07-05 PROCEDURE — 83735 ASSAY OF MAGNESIUM: CPT

## 2024-07-05 PROCEDURE — 85025 COMPLETE CBC W/AUTO DIFF WBC: CPT

## 2024-07-05 PROCEDURE — 93005 ELECTROCARDIOGRAM TRACING: CPT

## 2024-07-05 PROCEDURE — 97162 PT EVAL MOD COMPLEX 30 MIN: CPT

## 2024-07-05 PROCEDURE — 85610 PROTHROMBIN TIME: CPT

## 2024-07-05 PROCEDURE — 86900 BLOOD TYPING SEROLOGIC ABO: CPT

## 2024-07-05 PROCEDURE — 87086 URINE CULTURE/COLONY COUNT: CPT

## 2024-07-05 PROCEDURE — 85730 THROMBOPLASTIN TIME PARTIAL: CPT

## 2024-07-05 PROCEDURE — 99497 ADVNCD CARE PLAN 30 MIN: CPT | Mod: 25

## 2024-07-05 PROCEDURE — 81001 URINALYSIS AUTO W/SCOPE: CPT

## 2024-07-05 PROCEDURE — 74018 RADEX ABDOMEN 1 VIEW: CPT | Mod: 26

## 2024-07-05 PROCEDURE — 99223 1ST HOSP IP/OBS HIGH 75: CPT

## 2024-07-05 PROCEDURE — 87077 CULTURE AEROBIC IDENTIFY: CPT

## 2024-07-05 PROCEDURE — 80048 BASIC METABOLIC PNL TOTAL CA: CPT

## 2024-07-05 PROCEDURE — 84100 ASSAY OF PHOSPHORUS: CPT

## 2024-07-05 PROCEDURE — 86901 BLOOD TYPING SEROLOGIC RH(D): CPT

## 2024-07-05 PROCEDURE — 74018 RADEX ABDOMEN 1 VIEW: CPT

## 2024-07-05 PROCEDURE — 36415 COLL VENOUS BLD VENIPUNCTURE: CPT

## 2024-07-05 RX ORDER — ACETAMINOPHEN 325 MG
1000 TABLET ORAL EVERY 8 HOURS
Refills: 0 | Status: DISCONTINUED | OUTPATIENT
Start: 2024-07-05 | End: 2024-07-05

## 2024-07-05 RX ORDER — SENNOSIDES 8.6 MG
2 TABLET ORAL AT BEDTIME
Refills: 0 | Status: DISCONTINUED | OUTPATIENT
Start: 2024-07-05 | End: 2024-07-05

## 2024-07-05 RX ORDER — POLYETHYLENE GLYCOL 3350 1 G/G
17 POWDER ORAL EVERY 12 HOURS
Refills: 0 | Status: DISCONTINUED | OUTPATIENT
Start: 2024-07-05 | End: 2024-07-05

## 2024-07-05 RX ORDER — ACETAMINOPHEN 325 MG
2 TABLET ORAL
Qty: 0 | Refills: 0 | DISCHARGE
Start: 2024-07-05

## 2024-07-05 RX ADMIN — Medication 1000 MILLIGRAM(S): at 15:50

## 2024-07-05 RX ADMIN — LIDOCAINE HCL 1 PATCH: 28 GEL TOPICAL at 17:04

## 2024-07-05 RX ADMIN — Medication 1000 MILLIGRAM(S): at 16:50

## 2024-07-05 RX ADMIN — PANTOPRAZOLE SODIUM 40 MILLIGRAM(S): 40 INJECTION, POWDER, FOR SOLUTION INTRAVENOUS at 06:20

## 2024-07-05 RX ADMIN — ASPIRIN 81 MILLIGRAM(S): 325 TABLET, FILM COATED ORAL at 12:01

## 2024-07-08 ENCOUNTER — APPOINTMENT (OUTPATIENT)
Dept: UROLOGY | Facility: CLINIC | Age: 89
End: 2024-07-08

## 2024-07-08 PROBLEM — D63.8 ANEMIA IN OTHER CHRONIC DISEASES CLASSIFIED ELSEWHERE: Chronic | Status: ACTIVE | Noted: 2024-07-04

## 2024-07-08 PROBLEM — I48.20 CHRONIC ATRIAL FIBRILLATION, UNSPECIFIED: Chronic | Status: ACTIVE | Noted: 2024-07-04

## 2024-07-08 PROBLEM — I10 ESSENTIAL (PRIMARY) HYPERTENSION: Chronic | Status: ACTIVE | Noted: 2024-07-04

## 2024-07-08 PROBLEM — I50.22 CHRONIC SYSTOLIC (CONGESTIVE) HEART FAILURE: Chronic | Status: ACTIVE | Noted: 2024-07-04

## 2024-07-08 PROBLEM — M54.50 LOW BACK PAIN, UNSPECIFIED: Chronic | Status: ACTIVE | Noted: 2024-07-04

## 2024-07-08 PROBLEM — C50.919 MALIGNANT NEOPLASM OF UNSPECIFIED SITE OF UNSPECIFIED FEMALE BREAST: Chronic | Status: ACTIVE | Noted: 2024-07-04

## 2024-07-08 PROBLEM — N18.30 CHRONIC KIDNEY DISEASE, STAGE 3 UNSPECIFIED: Chronic | Status: ACTIVE | Noted: 2024-07-04

## 2024-07-09 LAB
-  AMPICILLIN: SIGNIFICANT CHANGE UP
-  CIPROFLOXACIN: SIGNIFICANT CHANGE UP
-  LEVOFLOXACIN: SIGNIFICANT CHANGE UP
-  LINEZOLID: SIGNIFICANT CHANGE UP
-  NITROFURANTOIN: SIGNIFICANT CHANGE UP
-  TETRACYCLINE: SIGNIFICANT CHANGE UP
-  VANCOMYCIN: SIGNIFICANT CHANGE UP
CULTURE RESULTS: ABNORMAL
METHOD TYPE: SIGNIFICANT CHANGE UP
ORGANISM # SPEC MICROSCOPIC CNT: ABNORMAL
ORGANISM # SPEC MICROSCOPIC CNT: SIGNIFICANT CHANGE UP
SPECIMEN SOURCE: SIGNIFICANT CHANGE UP

## 2024-07-11 ENCOUNTER — APPOINTMENT (OUTPATIENT)
Dept: HOME HEALTH SERVICES | Facility: HOME HEALTH | Age: 89
End: 2024-07-11
Payer: MEDICARE

## 2024-07-11 ENCOUNTER — APPOINTMENT (OUTPATIENT)
Dept: HOME HEALTH SERVICES | Facility: HOME HEALTH | Age: 89
End: 2024-07-11

## 2024-07-11 ENCOUNTER — NON-APPOINTMENT (OUTPATIENT)
Age: 89
End: 2024-07-11

## 2024-07-11 VITALS
DIASTOLIC BLOOD PRESSURE: 72 MMHG | OXYGEN SATURATION: 96 % | SYSTOLIC BLOOD PRESSURE: 132 MMHG | HEART RATE: 95 BPM | RESPIRATION RATE: 16 BRPM | TEMPERATURE: 97.2 F

## 2024-07-11 DIAGNOSIS — R53.83 OTHER FATIGUE: ICD-10-CM

## 2024-07-11 DIAGNOSIS — I50.20 UNSPECIFIED SYSTOLIC (CONGESTIVE) HEART FAILURE: ICD-10-CM

## 2024-07-11 DIAGNOSIS — E87.6 HYPOKALEMIA: ICD-10-CM

## 2024-07-11 DIAGNOSIS — E78.2 MIXED HYPERLIPIDEMIA: ICD-10-CM

## 2024-07-11 DIAGNOSIS — I48.91 UNSPECIFIED ATRIAL FIBRILLATION: ICD-10-CM

## 2024-07-11 DIAGNOSIS — R06.2 WHEEZING: ICD-10-CM

## 2024-07-11 DIAGNOSIS — Z71.89 OTHER SPECIFIED COUNSELING: ICD-10-CM

## 2024-07-11 DIAGNOSIS — K92.1 MELENA: ICD-10-CM

## 2024-07-11 DIAGNOSIS — G95.20 UNSPECIFIED CORD COMPRESSION: ICD-10-CM

## 2024-07-11 DIAGNOSIS — N18.4 CHRONIC KIDNEY DISEASE, STAGE 4 (SEVERE): ICD-10-CM

## 2024-07-11 DIAGNOSIS — D64.9 ANEMIA, UNSPECIFIED: ICD-10-CM

## 2024-07-11 DIAGNOSIS — L89.92 PRESSURE ULCER OF UNSPECIFIED SITE, STAGE 2: ICD-10-CM

## 2024-07-11 DIAGNOSIS — N17.9 ACUTE KIDNEY FAILURE, UNSPECIFIED: ICD-10-CM

## 2024-07-11 DIAGNOSIS — I10 ESSENTIAL (PRIMARY) HYPERTENSION: ICD-10-CM

## 2024-07-11 DIAGNOSIS — Z97.8 PRESENCE OF OTHER SPECIFIED DEVICES: ICD-10-CM

## 2024-07-11 PROCEDURE — 99496 TRANSJ CARE MGMT HIGH F2F 7D: CPT

## 2024-07-11 RX ORDER — BLOOD PRESSURE TEST KIT-LARGE
KIT MISCELLANEOUS
Qty: 1 | Refills: 0 | Status: ACTIVE | COMMUNITY
Start: 2024-07-11 | End: 1900-01-01

## 2024-07-12 DIAGNOSIS — D50.9 IRON DEFICIENCY ANEMIA, UNSPECIFIED: ICD-10-CM

## 2024-07-12 DIAGNOSIS — G89.29 OTHER CHRONIC PAIN: ICD-10-CM

## 2024-07-12 DIAGNOSIS — Z85.3 PERSONAL HISTORY OF MALIGNANT NEOPLASM OF BREAST: ICD-10-CM

## 2024-07-12 DIAGNOSIS — I13.0 HYPERTENSIVE HEART AND CHRONIC KIDNEY DISEASE WITH HEART FAILURE AND STAGE 1 THROUGH STAGE 4 CHRONIC KIDNEY DISEASE, OR UNSPECIFIED CHRONIC KIDNEY DISEASE: ICD-10-CM

## 2024-07-12 DIAGNOSIS — K62.5 HEMORRHAGE OF ANUS AND RECTUM: ICD-10-CM

## 2024-07-12 DIAGNOSIS — Z88.8 ALLERGY STATUS TO OTHER DRUGS, MEDICAMENTS AND BIOLOGICAL SUBSTANCES: ICD-10-CM

## 2024-07-12 DIAGNOSIS — Z96.82 PRESENCE OF NEUROSTIMULATOR: ICD-10-CM

## 2024-07-12 DIAGNOSIS — N13.9 OBSTRUCTIVE AND REFLUX UROPATHY, UNSPECIFIED: ICD-10-CM

## 2024-07-12 DIAGNOSIS — N18.30 CHRONIC KIDNEY DISEASE, STAGE 3 UNSPECIFIED: ICD-10-CM

## 2024-07-12 DIAGNOSIS — Z66 DO NOT RESUSCITATE: ICD-10-CM

## 2024-07-12 DIAGNOSIS — M54.50 LOW BACK PAIN, UNSPECIFIED: ICD-10-CM

## 2024-07-12 DIAGNOSIS — M21.372 FOOT DROP, LEFT FOOT: ICD-10-CM

## 2024-07-12 DIAGNOSIS — I48.20 CHRONIC ATRIAL FIBRILLATION, UNSPECIFIED: ICD-10-CM

## 2024-07-12 DIAGNOSIS — N17.9 ACUTE KIDNEY FAILURE, UNSPECIFIED: ICD-10-CM

## 2024-07-12 DIAGNOSIS — I50.22 CHRONIC SYSTOLIC (CONGESTIVE) HEART FAILURE: ICD-10-CM

## 2024-07-14 ENCOUNTER — NON-APPOINTMENT (OUTPATIENT)
Age: 89
End: 2024-07-14

## 2024-07-17 ENCOUNTER — RX RENEWAL (OUTPATIENT)
Age: 89
End: 2024-07-17

## 2024-07-17 DIAGNOSIS — Z87.19 PERSONAL HISTORY OF OTHER DISEASES OF THE DIGESTIVE SYSTEM: ICD-10-CM

## 2024-07-17 LAB
ALBUMIN SERPL ELPH-MCNC: 3.7 G/DL
ALP BLD-CCNC: 59 U/L
ALT SERPL-CCNC: 8 U/L
ANION GAP SERPL CALC-SCNC: 12 MMOL/L
BASOPHILS # BLD AUTO: 0.03 K/UL
BASOPHILS NFR BLD AUTO: 0.7 %
BILIRUB SERPL-MCNC: 0.4 MG/DL
BUN SERPL-MCNC: 55 MG/DL
CALCIUM SERPL-MCNC: 9.1 MG/DL
CHLORIDE SERPL-SCNC: 100 MMOL/L
CHOLEST SERPL-MCNC: 164 MG/DL
CO2 SERPL-SCNC: 25 MMOL/L
CREAT SERPL-MCNC: 1.69 MG/DL
EGFR: 28 ML/MIN/1.73M2
EOSINOPHIL # BLD AUTO: 0.13 K/UL
EOSINOPHIL NFR BLD AUTO: 2.9 %
GLUCOSE SERPL-MCNC: 73 MG/DL
HCT VFR BLD CALC: 27.7 %
HDLC SERPL-MCNC: 43 MG/DL
HGB BLD-MCNC: 8.4 G/DL
IMM GRANULOCYTES NFR BLD AUTO: 0.2 %
LDLC SERPL CALC-MCNC: 100 MG/DL
LYMPHOCYTES # BLD AUTO: 0.91 K/UL
LYMPHOCYTES NFR BLD AUTO: 20.5 %
MAN DIFF?: NORMAL
MCHC RBC-ENTMCNC: 26.1 PG
MCHC RBC-ENTMCNC: 30.3 GM/DL
MCV RBC AUTO: 86 FL
MONOCYTES # BLD AUTO: 0.37 K/UL
MONOCYTES NFR BLD AUTO: 8.3 %
NEUTROPHILS # BLD AUTO: 2.99 K/UL
NEUTROPHILS NFR BLD AUTO: 67.4 %
NONHDLC SERPL-MCNC: 121 MG/DL
PLATELET # BLD AUTO: 153 K/UL
POTASSIUM SERPL-SCNC: 4.8 MMOL/L
PROT SERPL-MCNC: 6 G/DL
RBC # BLD: 3.22 M/UL
RBC # FLD: 17.3 %
SODIUM SERPL-SCNC: 137 MMOL/L
TRIGL SERPL-MCNC: 117 MG/DL
TSH SERPL-ACNC: 1.79 UIU/ML
WBC # FLD AUTO: 4.44 K/UL

## 2024-07-19 ENCOUNTER — NON-APPOINTMENT (OUTPATIENT)
Age: 89
End: 2024-07-19

## 2024-07-27 ENCOUNTER — NON-APPOINTMENT (OUTPATIENT)
Age: 89
End: 2024-07-27

## 2024-07-27 DIAGNOSIS — K59.00 CONSTIPATION, UNSPECIFIED: ICD-10-CM

## 2024-07-28 PROBLEM — K59.00 CONSTIPATION: Status: ACTIVE | Noted: 2024-07-28

## 2024-07-28 RX ORDER — POLYETHYLENE GLYCOL 3350 17 G/17G
17 POWDER, FOR SOLUTION ORAL DAILY
Qty: 30 | Refills: 0 | Status: ACTIVE | COMMUNITY
Start: 2024-07-28 | End: 1900-01-01

## 2024-07-29 DIAGNOSIS — K64.9 UNSPECIFIED HEMORRHOIDS: ICD-10-CM

## 2024-07-30 PROBLEM — K64.9 HEMORRHOID: Status: ACTIVE | Noted: 2024-07-30

## 2024-07-31 ENCOUNTER — NON-APPOINTMENT (OUTPATIENT)
Age: 89
End: 2024-07-31

## 2024-08-06 ENCOUNTER — NON-APPOINTMENT (OUTPATIENT)
Age: 89
End: 2024-08-06

## 2024-08-07 ENCOUNTER — APPOINTMENT (OUTPATIENT)
Dept: HOME HEALTH SERVICES | Facility: HOME HEALTH | Age: 89
End: 2024-08-07

## 2024-08-07 RX ORDER — COCOA BUTTER, PHENYLEPHRINE HYDROCHLORIDE 2211; 6.25 MG/1; MG/1
0.25-88.44 SUPPOSITORY RECTAL
Qty: 1 | Refills: 3 | Status: ACTIVE | COMMUNITY
Start: 2024-07-30

## 2024-08-12 ENCOUNTER — TRANSCRIPTION ENCOUNTER (OUTPATIENT)
Age: 89
End: 2024-08-12

## 2024-08-12 ENCOUNTER — NON-APPOINTMENT (OUTPATIENT)
Age: 89
End: 2024-08-12

## 2024-08-14 ENCOUNTER — LABORATORY RESULT (OUTPATIENT)
Age: 89
End: 2024-08-14

## 2024-08-15 ENCOUNTER — LABORATORY RESULT (OUTPATIENT)
Age: 89
End: 2024-08-15

## 2024-08-16 ENCOUNTER — NON-APPOINTMENT (OUTPATIENT)
Age: 89
End: 2024-08-16

## 2024-08-16 ENCOUNTER — APPOINTMENT (OUTPATIENT)
Dept: UROLOGY | Facility: CLINIC | Age: 89
End: 2024-08-16

## 2024-08-16 ENCOUNTER — TRANSCRIPTION ENCOUNTER (OUTPATIENT)
Age: 89
End: 2024-08-16

## 2024-08-16 ENCOUNTER — LABORATORY RESULT (OUTPATIENT)
Age: 89
End: 2024-08-16

## 2024-08-20 ENCOUNTER — LABORATORY RESULT (OUTPATIENT)
Age: 89
End: 2024-08-20

## 2024-08-20 ENCOUNTER — NON-APPOINTMENT (OUTPATIENT)
Age: 89
End: 2024-08-20

## 2024-08-21 ENCOUNTER — APPOINTMENT (OUTPATIENT)
Dept: HOME HEALTH SERVICES | Facility: HOME HEALTH | Age: 89
End: 2024-08-21

## 2024-08-21 ENCOUNTER — LABORATORY RESULT (OUTPATIENT)
Age: 89
End: 2024-08-21

## 2024-08-21 VITALS
SYSTOLIC BLOOD PRESSURE: 100 MMHG | DIASTOLIC BLOOD PRESSURE: 70 MMHG | RESPIRATION RATE: 17 BRPM | TEMPERATURE: 97.8 F | HEART RATE: 88 BPM | OXYGEN SATURATION: 96 %

## 2024-08-21 DIAGNOSIS — N39.0 URINARY TRACT INFECTION, SITE NOT SPECIFIED: ICD-10-CM

## 2024-08-23 ENCOUNTER — NON-APPOINTMENT (OUTPATIENT)
Age: 89
End: 2024-08-23

## 2024-08-23 ENCOUNTER — LABORATORY RESULT (OUTPATIENT)
Age: 89
End: 2024-08-23

## 2024-08-24 ENCOUNTER — NON-APPOINTMENT (OUTPATIENT)
Age: 89
End: 2024-08-24

## 2024-08-24 ENCOUNTER — APPOINTMENT (OUTPATIENT)
Dept: HOME HEALTH SERVICES | Facility: HOME HEALTH | Age: 89
End: 2024-08-24

## 2024-08-24 RX ORDER — SENNOSIDES 8.6 MG TABLETS 8.6 MG/1
8.6 TABLET ORAL
Qty: 30 | Refills: 3 | Status: ACTIVE | COMMUNITY
Start: 2024-08-24 | End: 1900-01-01

## 2024-08-24 RX ORDER — FERROUS SULFATE 15 MG/ML
75 (15 FE) DROPS ORAL DAILY
Qty: 9 | Refills: 3 | Status: ACTIVE | COMMUNITY
Start: 2024-08-24 | End: 1900-01-01

## 2024-08-27 RX ORDER — AMOXICILLIN AND CLAVULANATE POTASSIUM 500; 125 MG/1; MG/1
500-125 TABLET, FILM COATED ORAL
Qty: 14 | Refills: 0 | Status: ACTIVE | COMMUNITY
Start: 2024-08-27 | End: 1900-01-01

## 2024-08-31 DIAGNOSIS — G47.00 INSOMNIA, UNSPECIFIED: ICD-10-CM

## 2024-08-31 RX ORDER — RAMELTEON 8 MG/1
8 TABLET ORAL
Qty: 30 | Refills: 0 | Status: ACTIVE | COMMUNITY
Start: 2024-08-31 | End: 1900-01-01

## 2024-08-31 RX ORDER — ADHESIVE TAPE 3"X 2.3 YD
5 TAPE, NON-MEDICATED TOPICAL AT BEDTIME
Qty: 30 | Refills: 0 | Status: ACTIVE | COMMUNITY
Start: 2024-08-31 | End: 1900-01-01

## 2024-09-11 ENCOUNTER — APPOINTMENT (OUTPATIENT)
Dept: HOME HEALTH SERVICES | Facility: HOME HEALTH | Age: 89
End: 2024-09-11

## 2024-12-31 NOTE — H&P ADULT - SOCIAL HISTORY
The patient has been examined and the H&P has been reviewed:    I concur with the findings and no changes have occurred since H&P was written.    Procedure risks, benefits and alternative options discussed and understood by patient/family.          There are no hospital problems to display for this patient.     Unable to obtain

## 2025-01-02 ENCOUNTER — RX RENEWAL (OUTPATIENT)
Age: 89
End: 2025-01-02

## 2025-03-12 ENCOUNTER — APPOINTMENT (OUTPATIENT)
Dept: OTOLARYNGOLOGY | Facility: CLINIC | Age: 89
End: 2025-03-12

## 2025-07-07 NOTE — ED ADULT NURSE NOTE - [facility]
LAST REFILL - 02/13/25  LAST VISIT - 05/15/24  NEXT VISIT - not scheduled    Routing to covering provider per PCP being out of office. Controlled substance protocol. Please advise. 30 day supply pended, added to sig appt needed for further refills.   Yes